# Patient Record
Sex: MALE | Race: WHITE | NOT HISPANIC OR LATINO | Employment: OTHER | URBAN - METROPOLITAN AREA
[De-identification: names, ages, dates, MRNs, and addresses within clinical notes are randomized per-mention and may not be internally consistent; named-entity substitution may affect disease eponyms.]

---

## 2017-01-04 ENCOUNTER — OFFICE VISIT (OUTPATIENT)
Dept: CARDIOLOGY | Facility: CLINIC | Age: 72
End: 2017-01-04

## 2017-01-04 VITALS
HEART RATE: 58 BPM | HEIGHT: 67 IN | SYSTOLIC BLOOD PRESSURE: 122 MMHG | BODY MASS INDEX: 29.1 KG/M2 | DIASTOLIC BLOOD PRESSURE: 58 MMHG | WEIGHT: 185.4 LBS

## 2017-01-04 DIAGNOSIS — Z86.73 STATUS POST CVA: ICD-10-CM

## 2017-01-04 DIAGNOSIS — I25.810 CORONARY ARTERY DISEASE INVOLVING CORONARY BYPASS GRAFT OF NATIVE HEART WITHOUT ANGINA PECTORIS: ICD-10-CM

## 2017-01-04 DIAGNOSIS — E78.5 DYSLIPIDEMIA: ICD-10-CM

## 2017-01-04 DIAGNOSIS — I10 ESSENTIAL HYPERTENSION: ICD-10-CM

## 2017-01-04 PROCEDURE — 99213 OFFICE O/P EST LOW 20 MIN: CPT | Performed by: INTERNAL MEDICINE

## 2017-01-04 RX ORDER — LORATADINE 10 MG/1
10 CAPSULE, LIQUID FILLED ORAL DAILY
COMMUNITY
End: 2020-03-03 | Stop reason: SDUPTHER

## 2017-01-04 NOTE — LETTER
January 4, 2017     Sampson Torres MD  [address] [place] [address]    Patient: Rashad Mendez   YOB: 1945   Date of Visit: 1/4/2017       Dear Dr. Brian MD:    Thank you for referring Rashad Mendez to me for evaluation. Below are the relevant portions of my assessment and plan of care.    If you have questions, please do not hesitate to call me. I look forward to following Rashad along with you.         Sincerely,        Ofelia Sexton MD        CC: No Recipients  Ofelia Sexton MD  1/4/2017 10:29 AM  Signed  Rashad Mendez  1945  [phone]      01/04/2017    Sampson Torres MD    Chief Complaint   Patient presents with   • Coronary Artery Disease     IDENTIFICATION:  Patient is a 71-year-old  white male,  and resident of [place], [place].    PROBLEM LIST:  1. Coronary artery disease:  a. History of extreme fatigue, February 2008.  b. Cardiac catheterization in February 2008:  Status post Liberté 3 mm x 12 mm stent to the OM1, normal LV systolic function.  c. Cardiolite, 10/29/2012:  Positive stress Cardiolite for inducible ischemia in the inferior myocardial segment.  d. Left heart catheterization for anginal symptoms, 11/13/2012, revealing normal LV systolic function and occluded right coronary with left-to-right collaterals, a 70% to 80% ostial left main stenosis, and normal LV systolic function.  e. Coronary artery bypass grafting, 11/16/2012, Dr. Byers:  SVG to the circumflex, SVG to the PDA, LIMA to the LAD.  2. CVA, 02/24/2014, felt secondary to accelerated hypertension:  a. Acute lacunar infarct.  b. 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  c. Subsequent admission, 03/01/2014, for acute on subacute ischemic stroke (right thalamic) with the addition of Plavix.  d. Diabetes, uncontrolled.  3. Hypertension:  a. Bradycardia, on beta-blockers.  Coreg discontinued, 03/01/2014.  4. Hyperlipidemia.  5. Type 2  diabetes mellitus.  6. Gastroesophageal reflux disease.  7. Diverticulitis.  8. Irritable bowel syndrome.  9. Obstructive sleep apnea with CPAP usage at bedtime.  10. Asthma.  11. History of tobacco abuse with cessation 20 years ago.  12. Obesity.  13. History of transient ischemic attack in 1994 (right facial drooping and upper extremity weakness).  14. Surgical history:  a. Status post cholecystectomy.  b. Status post right rotator cuff repair.    Allergies   Allergen Reactions   • Latex Rash   • Levofloxacin Anaphylaxis   • Sulfa Antibiotics Anaphylaxis   • Beta Adrenergic Blockers Other (See Comments)     Significant bradycardia   • Diovan [Valsartan] Diarrhea   • Entex Lq [Phenylephrine-Guaifenesin] Hives       Current Medications:      Current Outpatient Prescriptions:   •  ADVAIR DISKUS 250-50 MCG/DOSE DISKUS, INHALE ONE PUFF BY MOUTH TWICE A DAY, Disp: 1 each, Rfl: 5  •  amLODIPine (NORVASC) 10 MG tablet, Take 10 mg by mouth Daily., Disp: , Rfl:   •  BD PEN NEEDLE JOHN U/F 32G X 4 MM misc, USE WITH INSULIN PEN FOUR TIMES DAILY, Disp: 300 each, Rfl: 2  •  Blood Glucose Monitoring Suppl (ACCU-CHEK COMPACT CARE KIT) kit, Pt. Is visually impaired - qualifies for this meter, Disp: 1 each, Rfl: 0  •  clopidogrel (PLAVIX) 75 MG tablet, TAKE ONE TABLET BY MOUTH DAILY, Disp: 30 tablet, Rfl: 3  •  fluticasone (FLONASE) 50 MCG/ACT nasal spray, PLACE TWO SPRAYS IN EACH NOSTRIL ONCE DAILY, Disp: 3 bottle, Rfl: 4  •  glucose blood (ACCU-CHEK COMPACT TEST DRUM) test strip, Uses X 3 /day, Disp: 102 each, Rfl: 12  •  indapamide (LOZOL) 1.25 MG tablet, TAKE ONE TABLET BY MOUTH DAILY, Disp: 30 tablet, Rfl: 5  •  insulin aspart prot & aspart (NOVOLOG MIX 70/30 FLEXPEN) (70-30) 100 UNIT/ML suspension pen-injector injection, Inject 0.4 mL under the skin 2 (Two) Times a Day Before Meals., Disp: 35 pen, Rfl: 3  •  lisinopril (PRINIVIL,ZESTRIL) 40 MG tablet, TAKE ONE TABLET BY MOUTH DAILY, Disp: 90 tablet, Rfl: 0  •  Loratadine  "(CLARITIN) 10 MG capsule, Take 10 mg by mouth Daily., Disp: , Rfl:   •  MICRONIZED COLESTIPOL HCL 1 G tablet, 1 g Daily., Disp: , Rfl:   •  montelukast (SINGULAIR) 10 MG tablet, Take 10 mg by mouth Every Night., Disp: , Rfl:   •  nitroglycerin (NITROSTAT) 0.4 MG SL tablet, Place  under the tongue., Disp: , Rfl:   •  omeprazole (PriLOSEC) 40 MG capsule, Take  by mouth daily., Disp: , Rfl:   •  pravastatin (PRAVACHOL) 80 MG tablet, Take  by mouth daily., Disp: , Rfl:   •  terazosin (HYTRIN) 10 MG capsule, Take 10 mg by mouth Every Night., Disp: , Rfl:   •  VENTOLIN  (90 BASE) MCG/ACT inhaler, , Disp: , Rfl:     HPI    Mr. Mendez presents today for 12 month follow up with a history of coronary artery disease status post bypass grafting, CVA, hypertension, and hyperlipidemia. He complains today of stomach discomfort and shortness of breath secondary to asthma, but voices no cardiac complaints. Patient denies chest pain, palpitations, edema, PND, orthopnea, dizziness, and syncope. He admits that he does not currently participate in any regular aerobic exercise.    The following portions of the patient's history were reviewed and updated as appropriate: allergies, current medications and problem list.    Pertinent positives as listed in the HPI.  All other systems reviewed are negative.    Vitals:    01/04/17 0950   BP: 122/58   BP Location: Right arm   Patient Position: Sitting   Pulse: 58   Weight: 185 lb 6.4 oz (84.1 kg)   Height: 67\" (170.2 cm)       General: Alert and oriented  Neck: Jugular venous pressure is within normal limits. Carotids have normal upstrokes without bruits.   Cardiovascular: Heart has a nondisplaced focal PMI. Regular rate and rhythm without murmur, gallop or rub.  Lungs: Clear without rales or wheezes. Equal expansion is noted.   Extremities: Show no edema.  Skin: warm and dry.  Neurologic: nonfocal      Diagnostic Data:    Lab Results   Component Value Date    GLUCOSE 144 (H) 12/07/2016    " BUN 18 12/07/2016    CREATININE 1.20 12/07/2016    EGFRIFNONA 60 (L) 12/07/2016    BCR 15.0 12/07/2016    CO2 34.0 (H) 12/07/2016    CALCIUM 9.6 12/07/2016    PROTENTOTREF 7.0 12/07/2016    ALBUMIN 4.00 12/07/2016    LABIL2 1.3 12/07/2016    AST 30 12/07/2016    ALT 41 (H) 12/07/2016     Lab Results   Component Value Date    TSH 2.004 12/07/2016     Procedures    Assessment:      ICD-10-CM ICD-9-CM   1. Coronary artery disease involving coronary bypass graft of native heart without angina pectoris I25.810 414.05   2. Status post CVA Z86.73 V12.54   3. Essential hypertension I10 401.9   4. Dyslipidemia E78.5 272.4       Plan:    1. Discussed regular aerobic exercise (30 minutes, 3-5 times a week).   2. Ordered FLP.   3. Continue current medications.  4. F/up in 12 months or sooner if needed.    Scribed for Ofelia Sexton MD by Brigitte Eisenberg. 1/4/2017  10:20 AM    I, Ofelia Sexton MD, personally performed the services described in this documentation as scribed by the above named individual in my presence, and it is both accurate and complete.  1/4/2017  10:21 AM

## 2017-01-04 NOTE — PROGRESS NOTES
Rashad Mendez  1945  [phone]      01/04/2017    Sampson Torres MD    Chief Complaint   Patient presents with   • Coronary Artery Disease     IDENTIFICATION:  Patient is a 71-year-old  white male,  and resident of [place], [place].    PROBLEM LIST:  1. Coronary artery disease:  a. History of extreme fatigue, February 2008.  b. Cardiac catheterization in February 2008:  Status post Liberté 3 mm x 12 mm stent to the OM1, normal LV systolic function.  c. Cardiolite, 10/29/2012:  Positive stress Cardiolite for inducible ischemia in the inferior myocardial segment.  d. Left heart catheterization for anginal symptoms, 11/13/2012, revealing normal LV systolic function and occluded right coronary with left-to-right collaterals, a 70% to 80% ostial left main stenosis, and normal LV systolic function.  e. Coronary artery bypass grafting, 11/16/2012, Dr. Byers:  SVG to the circumflex, SVG to the PDA, LIMA to the LAD.  2. CVA, 02/24/2014, felt secondary to accelerated hypertension:  a. Acute lacunar infarct.  b. 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  c. Subsequent admission, 03/01/2014, for acute on subacute ischemic stroke (right thalamic) with the addition of Plavix.  d. Diabetes, uncontrolled.  3. Hypertension:  a. Bradycardia, on beta-blockers.  Coreg discontinued, 03/01/2014.  4. Hyperlipidemia.  5. Type 2 diabetes mellitus.  6. Gastroesophageal reflux disease.  7. Diverticulitis.  8. Irritable bowel syndrome.  9. Obstructive sleep apnea with CPAP usage at bedtime.  10. Asthma.  11. History of tobacco abuse with cessation 20 years ago.  12. Obesity.  13. History of transient ischemic attack in 1994 (right facial drooping and upper extremity weakness).  14. Surgical history:  a. Status post cholecystectomy.  b. Status post right rotator cuff repair.    Allergies   Allergen Reactions   • Latex Rash   • Levofloxacin Anaphylaxis   • Sulfa Antibiotics Anaphylaxis   •  Beta Adrenergic Blockers Other (See Comments)     Significant bradycardia   • Diovan [Valsartan] Diarrhea   • Entex Lq [Phenylephrine-Guaifenesin] Hives       Current Medications:      Current Outpatient Prescriptions:   •  ADVAIR DISKUS 250-50 MCG/DOSE DISKUS, INHALE ONE PUFF BY MOUTH TWICE A DAY, Disp: 1 each, Rfl: 5  •  amLODIPine (NORVASC) 10 MG tablet, Take 10 mg by mouth Daily., Disp: , Rfl:   •  BD PEN NEEDLE JOHN U/F 32G X 4 MM misc, USE WITH INSULIN PEN FOUR TIMES DAILY, Disp: 300 each, Rfl: 2  •  Blood Glucose Monitoring Suppl (ACCU-CHEK COMPACT CARE KIT) kit, Pt. Is visually impaired - qualifies for this meter, Disp: 1 each, Rfl: 0  •  clopidogrel (PLAVIX) 75 MG tablet, TAKE ONE TABLET BY MOUTH DAILY, Disp: 30 tablet, Rfl: 3  •  fluticasone (FLONASE) 50 MCG/ACT nasal spray, PLACE TWO SPRAYS IN EACH NOSTRIL ONCE DAILY, Disp: 3 bottle, Rfl: 4  •  glucose blood (ACCU-CHEK COMPACT TEST DRUM) test strip, Uses X 3 /day, Disp: 102 each, Rfl: 12  •  indapamide (LOZOL) 1.25 MG tablet, TAKE ONE TABLET BY MOUTH DAILY, Disp: 30 tablet, Rfl: 5  •  insulin aspart prot & aspart (NOVOLOG MIX 70/30 FLEXPEN) (70-30) 100 UNIT/ML suspension pen-injector injection, Inject 0.4 mL under the skin 2 (Two) Times a Day Before Meals., Disp: 35 pen, Rfl: 3  •  lisinopril (PRINIVIL,ZESTRIL) 40 MG tablet, TAKE ONE TABLET BY MOUTH DAILY, Disp: 90 tablet, Rfl: 0  •  Loratadine (CLARITIN) 10 MG capsule, Take 10 mg by mouth Daily., Disp: , Rfl:   •  MICRONIZED COLESTIPOL HCL 1 G tablet, 1 g Daily., Disp: , Rfl:   •  montelukast (SINGULAIR) 10 MG tablet, Take 10 mg by mouth Every Night., Disp: , Rfl:   •  nitroglycerin (NITROSTAT) 0.4 MG SL tablet, Place  under the tongue., Disp: , Rfl:   •  omeprazole (PriLOSEC) 40 MG capsule, Take  by mouth daily., Disp: , Rfl:   •  pravastatin (PRAVACHOL) 80 MG tablet, Take  by mouth daily., Disp: , Rfl:   •  terazosin (HYTRIN) 10 MG capsule, Take 10 mg by mouth Every Night., Disp: , Rfl:   •  VENTOLIN  " (90 BASE) MCG/ACT inhaler, , Disp: , Rfl:     HPI    Mr. Mendez presents today for 12 month follow up with a history of coronary artery disease status post bypass grafting, CVA, hypertension, and hyperlipidemia. He complains today of stomach discomfort and shortness of breath secondary to asthma, but voices no cardiac complaints. Patient denies chest pain, palpitations, edema, PND, orthopnea, dizziness, and syncope. He admits that he does not currently participate in any regular aerobic exercise.    The following portions of the patient's history were reviewed and updated as appropriate: allergies, current medications and problem list.    Pertinent positives as listed in the HPI.  All other systems reviewed are negative.    Vitals:    01/04/17 0950   BP: 122/58   BP Location: Right arm   Patient Position: Sitting   Pulse: 58   Weight: 185 lb 6.4 oz (84.1 kg)   Height: 67\" (170.2 cm)       General: Alert and oriented  Neck: Jugular venous pressure is within normal limits. Carotids have normal upstrokes without bruits.   Cardiovascular: Heart has a nondisplaced focal PMI. Regular rate and rhythm without murmur, gallop or rub.  Lungs: Clear without rales or wheezes. Equal expansion is noted.   Extremities: Show no edema.  Skin: warm and dry.  Neurologic: nonfocal      Diagnostic Data:    Lab Results   Component Value Date    GLUCOSE 144 (H) 12/07/2016    BUN 18 12/07/2016    CREATININE 1.20 12/07/2016    EGFRIFNONA 60 (L) 12/07/2016    BCR 15.0 12/07/2016    CO2 34.0 (H) 12/07/2016    CALCIUM 9.6 12/07/2016    PROTENTOTREF 7.0 12/07/2016    ALBUMIN 4.00 12/07/2016    LABIL2 1.3 12/07/2016    AST 30 12/07/2016    ALT 41 (H) 12/07/2016     Lab Results   Component Value Date    TSH 2.004 12/07/2016     Procedures    Assessment:      ICD-10-CM ICD-9-CM   1. Coronary artery disease involving coronary bypass graft of native heart without angina pectoris I25.810 414.05   2. Status post CVA Z86.73 V12.54   3. Essential " hypertension I10 401.9   4. Dyslipidemia E78.5 272.4       Plan:    1. Discussed regular aerobic exercise (30 minutes, 3-5 times a week).   2. Ordered FLP.   3. Continue current medications.  4. F/up in 12 months or sooner if needed.    Scribed for Ofelia Sexton MD by Brigitte Eisenberg. 1/4/2017  10:20 AM    I, Ofelia Sexton MD, personally performed the services described in this documentation as scribed by the above named individual in my presence, and it is both accurate and complete.  1/4/2017  10:21 AM

## 2017-01-05 RX ORDER — LISINOPRIL 40 MG/1
TABLET ORAL
Qty: 90 TABLET | Refills: 3 | Status: SHIPPED | OUTPATIENT
Start: 2017-01-05 | End: 2018-01-11 | Stop reason: SDUPTHER

## 2017-01-05 RX ORDER — TERAZOSIN 10 MG/1
CAPSULE ORAL
Qty: 90 CAPSULE | Refills: 3 | Status: SHIPPED | OUTPATIENT
Start: 2017-01-05 | End: 2017-11-27 | Stop reason: SDUPTHER

## 2017-01-23 RX ORDER — PRAVASTATIN SODIUM 80 MG/1
TABLET ORAL
Qty: 90 TABLET | Refills: 1 | Status: SHIPPED | OUTPATIENT
Start: 2017-01-23 | End: 2017-03-09 | Stop reason: ALTCHOICE

## 2017-02-10 RX ORDER — INDAPAMIDE 1.25 MG/1
TABLET, FILM COATED ORAL
Qty: 30 TABLET | Refills: 4 | Status: SHIPPED | OUTPATIENT
Start: 2017-02-10 | End: 2017-07-07 | Stop reason: SDUPTHER

## 2017-03-06 DIAGNOSIS — E11.9 DIABETES MELLITUS TYPE 2, CONTROLLED, WITHOUT COMPLICATIONS (HCC): ICD-10-CM

## 2017-03-06 RX ORDER — LANCETS 33 GAUGE
EACH MISCELLANEOUS
Qty: 50 EACH | Refills: 0 | Status: SHIPPED | OUTPATIENT
Start: 2017-03-06 | End: 2017-06-14 | Stop reason: SDUPTHER

## 2017-03-09 DIAGNOSIS — E78.00 HYPERCHOLESTEREMIA: ICD-10-CM

## 2017-03-09 DIAGNOSIS — I25.10 CAD IN NATIVE ARTERY: Primary | ICD-10-CM

## 2017-03-09 RX ORDER — ATORVASTATIN CALCIUM 40 MG/1
40 TABLET, FILM COATED ORAL DAILY
Qty: 90 TABLET | Refills: 1 | Status: SHIPPED | OUTPATIENT
Start: 2017-03-09 | End: 2017-09-02 | Stop reason: SDUPTHER

## 2017-03-13 RX ORDER — AMLODIPINE BESYLATE 10 MG/1
TABLET ORAL
Qty: 180 TABLET | Refills: 3 | Status: SHIPPED | OUTPATIENT
Start: 2017-03-13 | End: 2018-06-21 | Stop reason: SDUPTHER

## 2017-04-04 ENCOUNTER — LAB REQUISITION (OUTPATIENT)
Dept: LAB | Facility: HOSPITAL | Age: 72
End: 2017-04-04

## 2017-04-04 DIAGNOSIS — R63.5 ABNORMAL WEIGHT GAIN: ICD-10-CM

## 2017-04-04 DIAGNOSIS — D12.8 BENIGN NEOPLASM OF RECTUM: ICD-10-CM

## 2017-04-04 PROCEDURE — 88305 TISSUE EXAM BY PATHOLOGIST: CPT | Performed by: INTERNAL MEDICINE

## 2017-04-05 LAB
CYTO UR: NORMAL
LAB AP CASE REPORT: NORMAL
LAB AP CLINICAL INFORMATION: NORMAL
Lab: NORMAL
PATH REPORT.FINAL DX SPEC: NORMAL
PATH REPORT.GROSS SPEC: NORMAL

## 2017-04-28 RX ORDER — CLOPIDOGREL BISULFATE 75 MG/1
TABLET ORAL
Qty: 90 TABLET | Refills: 3 | Status: SHIPPED | OUTPATIENT
Start: 2017-04-28 | End: 2018-04-23 | Stop reason: SDUPTHER

## 2017-05-16 ENCOUNTER — OFFICE VISIT (OUTPATIENT)
Dept: PULMONOLOGY | Facility: CLINIC | Age: 72
End: 2017-05-16

## 2017-05-16 VITALS
WEIGHT: 188.4 LBS | HEART RATE: 72 BPM | RESPIRATION RATE: 16 BRPM | TEMPERATURE: 98.4 F | OXYGEN SATURATION: 95 % | BODY MASS INDEX: 29.57 KG/M2 | HEIGHT: 67 IN | SYSTOLIC BLOOD PRESSURE: 130 MMHG | DIASTOLIC BLOOD PRESSURE: 78 MMHG

## 2017-05-16 DIAGNOSIS — K21.9 GASTROESOPHAGEAL REFLUX DISEASE WITHOUT ESOPHAGITIS: ICD-10-CM

## 2017-05-16 DIAGNOSIS — J45.20 MILD INTERMITTENT ASTHMA WITHOUT COMPLICATION: ICD-10-CM

## 2017-05-16 DIAGNOSIS — J30.1 SEASONAL ALLERGIC RHINITIS DUE TO POLLEN: Primary | ICD-10-CM

## 2017-05-16 DIAGNOSIS — J98.4 RESTRICTIVE LUNG DISEASE: ICD-10-CM

## 2017-05-16 DIAGNOSIS — G47.33 OBSTRUCTIVE SLEEP APNEA SYNDROME: ICD-10-CM

## 2017-05-16 PROCEDURE — 99214 OFFICE O/P EST MOD 30 MIN: CPT | Performed by: INTERNAL MEDICINE

## 2017-05-16 RX ORDER — MONTELUKAST SODIUM 10 MG/1
10 TABLET ORAL NIGHTLY
Qty: 90 TABLET | Refills: 3 | Status: SHIPPED | OUTPATIENT
Start: 2017-05-16 | End: 2018-05-31 | Stop reason: SDUPTHER

## 2017-05-16 RX ORDER — ALBUTEROL SULFATE 90 UG/1
2 AEROSOL, METERED RESPIRATORY (INHALATION) EVERY 4 HOURS PRN
Qty: 3 INHALER | Refills: 3 | Status: SHIPPED | OUTPATIENT
Start: 2017-05-16 | End: 2017-12-24 | Stop reason: SDUPTHER

## 2017-05-16 RX ORDER — FLUTICASONE PROPIONATE 50 MCG
2 SPRAY, SUSPENSION (ML) NASAL DAILY
Qty: 3 BOTTLE | Refills: 3 | Status: SHIPPED | OUTPATIENT
Start: 2017-05-16 | End: 2018-08-01 | Stop reason: SDUPTHER

## 2017-06-07 DIAGNOSIS — IMO0002 TYPE II DIABETES MELLITUS WITH PERIPHERAL CIRCULATORY DISORDER, UNCONTROLLED: Primary | ICD-10-CM

## 2017-06-14 DIAGNOSIS — E11.9 DIABETES MELLITUS TYPE 2, CONTROLLED, WITHOUT COMPLICATIONS (HCC): ICD-10-CM

## 2017-06-14 RX ORDER — LANCETS 33 GAUGE
EACH MISCELLANEOUS
Qty: 100 EACH | Refills: 0 | Status: SHIPPED | OUTPATIENT
Start: 2017-06-14 | End: 2017-07-21 | Stop reason: ALTCHOICE

## 2017-07-07 RX ORDER — INDAPAMIDE 1.25 MG/1
TABLET, FILM COATED ORAL
Qty: 90 TABLET | Refills: 2 | Status: SHIPPED | OUTPATIENT
Start: 2017-07-07 | End: 2018-05-07 | Stop reason: SDUPTHER

## 2017-07-21 RX ORDER — LANCETS
1 EACH MISCELLANEOUS 3 TIMES DAILY
Qty: 100 EACH | Refills: 5 | Status: SHIPPED | OUTPATIENT
Start: 2017-07-21 | End: 2021-03-31

## 2017-09-05 RX ORDER — ATORVASTATIN CALCIUM 40 MG/1
TABLET, FILM COATED ORAL
Qty: 90 TABLET | Refills: 0 | Status: SHIPPED | OUTPATIENT
Start: 2017-09-05 | End: 2017-11-01 | Stop reason: SDUPTHER

## 2017-09-27 DIAGNOSIS — E11.9 DIABETES MELLITUS TYPE 2, CONTROLLED, WITHOUT COMPLICATIONS (HCC): ICD-10-CM

## 2017-09-27 RX ORDER — LANCETS 33 GAUGE
EACH MISCELLANEOUS
Qty: 100 EACH | Refills: 5 | Status: SHIPPED | OUTPATIENT
Start: 2017-09-27 | End: 2018-11-10 | Stop reason: SDUPTHER

## 2017-11-01 RX ORDER — ATORVASTATIN CALCIUM 40 MG/1
TABLET, FILM COATED ORAL
Qty: 90 TABLET | Refills: 0 | Status: SHIPPED | OUTPATIENT
Start: 2017-11-01 | End: 2018-01-11 | Stop reason: SDUPTHER

## 2017-11-06 ENCOUNTER — OFFICE VISIT (OUTPATIENT)
Dept: PULMONOLOGY | Facility: CLINIC | Age: 72
End: 2017-11-06

## 2017-11-06 VITALS
SYSTOLIC BLOOD PRESSURE: 138 MMHG | WEIGHT: 191 LBS | HEART RATE: 60 BPM | OXYGEN SATURATION: 96 % | DIASTOLIC BLOOD PRESSURE: 72 MMHG | RESPIRATION RATE: 16 BRPM | TEMPERATURE: 98.7 F | HEIGHT: 67 IN | BODY MASS INDEX: 29.98 KG/M2

## 2017-11-06 DIAGNOSIS — J30.1 CHRONIC SEASONAL ALLERGIC RHINITIS DUE TO POLLEN: ICD-10-CM

## 2017-11-06 DIAGNOSIS — R06.02 SHORTNESS OF BREATH: Primary | ICD-10-CM

## 2017-11-06 DIAGNOSIS — G47.33 OBSTRUCTIVE SLEEP APNEA SYNDROME: ICD-10-CM

## 2017-11-06 DIAGNOSIS — J98.4 RESTRICTIVE LUNG DISEASE: ICD-10-CM

## 2017-11-06 DIAGNOSIS — K21.9 GASTROESOPHAGEAL REFLUX DISEASE WITHOUT ESOPHAGITIS: ICD-10-CM

## 2017-11-06 DIAGNOSIS — J45.20 MILD INTERMITTENT ASTHMA WITHOUT COMPLICATION: ICD-10-CM

## 2017-11-06 PROCEDURE — 99214 OFFICE O/P EST MOD 30 MIN: CPT | Performed by: INTERNAL MEDICINE

## 2017-11-06 PROCEDURE — G0009 ADMIN PNEUMOCOCCAL VACCINE: HCPCS | Performed by: INTERNAL MEDICINE

## 2017-11-06 PROCEDURE — 94729 DIFFUSING CAPACITY: CPT | Performed by: INTERNAL MEDICINE

## 2017-11-06 PROCEDURE — 94375 RESPIRATORY FLOW VOLUME LOOP: CPT | Performed by: INTERNAL MEDICINE

## 2017-11-06 PROCEDURE — G0008 ADMIN INFLUENZA VIRUS VAC: HCPCS | Performed by: INTERNAL MEDICINE

## 2017-11-06 PROCEDURE — 90662 IIV NO PRSV INCREASED AG IM: CPT | Performed by: INTERNAL MEDICINE

## 2017-11-06 PROCEDURE — 94726 PLETHYSMOGRAPHY LUNG VOLUMES: CPT | Performed by: INTERNAL MEDICINE

## 2017-11-06 PROCEDURE — 90670 PCV13 VACCINE IM: CPT | Performed by: INTERNAL MEDICINE

## 2017-11-06 RX ORDER — INSULIN ASPART 100 [IU]/ML
INJECTION, SUSPENSION SUBCUTANEOUS
COMMUNITY
Start: 2017-10-24 | End: 2020-06-15

## 2017-11-06 NOTE — PROGRESS NOTES
Pulmonary Follow-up      Patient Care Team:  Sampson Torres MD as PCP - General  Isabell PINEDA MD as Consulting Physician (Endocrinology)    Chief Complaint / Reason: Asthma, obstructive sleep apnea        Subjective    This is a 72-year-old male with a history of asthma followed by Dr. Rod in the past who I originally saw on January 2015.  The patient at the time had been getting over what seemed like a recent asthma exacerbation and had pulmonary function testing consistent with mild to moderate restriction without obstruction.  The patient has obstructive sleep apnea and uses a home CPAP machine.    Interval History:  The patient is here for follow up of asthma.  He has not needed antibiotics or steroids since his last visit.  He reports he uses his rescue inhaler every 4 or 5 days.  It varies based on weather or time of year.  He reports Fall is his worst time of year.  He has had some congestion recently.       He is using Advair 250 one puff twice daily.  He denies fevers or chills.  He is using his CPAP and feels like the mask leaks at times.  He seems to wake up around 2 or 3 am and moves to a chair.  He uses his CPAP nightly, even when he is sleeping in his chair.  He recently got a new mask and really likes it.  He denies LE edema.  He had an overnight oximetry on CPAP after his last visit with no significant oxygen desaturations.    History taken from: patient    PMH/FH/Social History were reviewed and updated appropriately in the electronic medical record.     Review of Systems:   Review of Systems   Constitutional: Negative for activity change, appetite change, chills, diaphoresis, fatigue, fever and unexpected weight change.   HENT: Positive for congestion, rhinorrhea and sinus pressure. Negative for dental problem, ear pain, hearing loss, nosebleeds and postnasal drip.    Eyes: Positive for visual disturbance (stable). Negative for pain, discharge and redness.   Respiratory: Positive for  apnea, shortness of breath and wheezing. Negative for cough, choking, chest tightness and stridor.    Cardiovascular: Negative for chest pain, palpitations and leg swelling.   Gastrointestinal: Positive for diarrhea and nausea. Negative for abdominal distention, abdominal pain, blood in stool, constipation and vomiting.   Endocrine: Positive for heat intolerance. Negative for cold intolerance, polydipsia and polyuria.   Genitourinary: Positive for frequency. Negative for dysuria, hematuria and urgency.   Musculoskeletal: Positive for arthralgias and myalgias. Negative for joint swelling.   Skin: Negative for color change, rash and wound.   Allergic/Immunologic: Positive for environmental allergies. Negative for immunocompromised state.   Neurological: Positive for weakness, light-headedness and numbness (left hand stable). Negative for dizziness, syncope and headaches.   Hematological: Negative for adenopathy. Bruises/bleeds easily.   Psychiatric/Behavioral: Positive for sleep disturbance. Negative for dysphoric mood and suicidal ideas. The patient is not nervous/anxious.    All other systems reviewed and are negative.    All other systems were reviewed and are negative.  Exceptions are noted in the subjective or above.      Objective     Vital Signs         Last Weight    11/06/17  1359   Weight: 191 lb (86.6 kg)       Body mass index is 29.91 kg/(m^2).    Physical Exam:     Constitutional:    Alert, cooperative, Overweight, in no acute distress   Head:    Normocephalic, without obvious abnormality, atraumatic   Eyes:            Lids and lashes normal, conjunctivae and sclerae normal, no   icterus, no pallor, corneas clear, PER   ENMT:   Ears appear intact with no abnormalities noted      No oral lesions, no thrush, oral mucosa moist, Mild nasal mucosal swelling, Mallampati class IV    Neck:   No adenopathy, supple, trachea midline, no thyromegaly, no JVD       Lungs/Resp:     Normal effort, symmetric chest rise,  no crepitus, clear to      auscultation bilaterally, no chest wall tenderness                 Heart/CV:    Regular rhythm and normal rate, normal S1 and S2, no            murmur   Abdomen/GI:     Soft, non-tender, non-distended, normal bowel sounds   :     Deferred   Extremities/MSK:   No clubbing or cyanosis.  No edema.  Normal tone.  No deformities.    Pulses:   Pulses palpable and equal bilaterally   Skin:   No bleeding, bruising or rash   Heme/Lymph:   No cervical or supraclavicular adenopathy.    Neurologic:    Psychiatric:       Moves all extremities with no obvious focal motor deficit.  Cranial nerves 2 - 12 grossly intact   Oriented x 3, normal affect.             Results Review:     I reviewed the patient's new clinical results.       Imaging:  I reviewed the patient's new imaging including reviewing the images and radiologist's report. No new imaging today.  Colon    PFTs:   Full pulmonary function testing was done today.  Please see scanned PFT report for details.    Interpretation: No obstruction.  Moderate restriction.  Low maximal voluntary ventilation.  No air trapping.  Low DLCO which corrects when adjusted for alveolar ventilated volumes.  Compared to prior study on 11/10/2016, FEV1 is decreased by 250 mL on today's study.    Medication Review:       No current facility-administered medications for this visit.     Assessment/Plan   Problems Addressed this Visit        Respiratory    Obstructive sleep apnea syndrome    Allergic rhinitis    Asthma    Restrictive lung disease       Digestive    Gastroesophageal reflux disease      Other Visit Diagnoses     Shortness of breath    -  Primary    Relevant Orders    Pulmonary Function Test (Completed)        Discussion:  Patient with a history of asthma and allergic rhinitis, maintained on Advair 250/50 as well as Singulair and fluticasone nasal spray is here for follow-up.  He is overall doing well.     The patient has been more compliant with  CPAP.    Plan:  High dose flu shot.   Prevnar.   Continue Advair - if worsened symptoms patient can call and go on 3 months of Advair 500 then step back down to 250.    F/U 6 months or PRN.   Continue CPAP.  Continue Singulair.  Continue fluticasone nasal.  Follow-up in 6 months with spirometry    Zane Amador MD  11/10/17  3:39 PM    *. Please note that portions of this note were completed with a voice recognition program. Efforts were made to edit the dictations, but occasionally words are mistranscribed.

## 2017-11-28 RX ORDER — TERAZOSIN 10 MG/1
CAPSULE ORAL
Qty: 90 CAPSULE | Refills: 2 | Status: SHIPPED | OUTPATIENT
Start: 2017-11-28 | End: 2018-11-07 | Stop reason: SDUPTHER

## 2017-12-24 DIAGNOSIS — J45.20 MILD INTERMITTENT ASTHMA WITHOUT COMPLICATION: ICD-10-CM

## 2017-12-26 RX ORDER — ALBUTEROL SULFATE 90 UG/1
AEROSOL, METERED RESPIRATORY (INHALATION)
Qty: 1 INHALER | Refills: 11 | Status: SHIPPED | OUTPATIENT
Start: 2017-12-26 | End: 2020-03-03 | Stop reason: SDUPTHER

## 2018-01-10 ENCOUNTER — APPOINTMENT (OUTPATIENT)
Dept: LAB | Facility: HOSPITAL | Age: 73
End: 2018-01-10

## 2018-01-10 ENCOUNTER — OFFICE VISIT (OUTPATIENT)
Dept: CARDIOLOGY | Facility: CLINIC | Age: 73
End: 2018-01-10

## 2018-01-10 ENCOUNTER — LAB REQUISITION (OUTPATIENT)
Dept: LAB | Facility: HOSPITAL | Age: 73
End: 2018-01-10

## 2018-01-10 VITALS
SYSTOLIC BLOOD PRESSURE: 150 MMHG | HEIGHT: 67 IN | HEART RATE: 60 BPM | WEIGHT: 192.2 LBS | DIASTOLIC BLOOD PRESSURE: 54 MMHG | BODY MASS INDEX: 30.17 KG/M2

## 2018-01-10 DIAGNOSIS — I25.10 CAD IN NATIVE ARTERY: Primary | ICD-10-CM

## 2018-01-10 DIAGNOSIS — I25.10 CORONARY ARTERY DISEASE INVOLVING NATIVE CORONARY ARTERY OF NATIVE HEART WITHOUT ANGINA PECTORIS: Primary | ICD-10-CM

## 2018-01-10 DIAGNOSIS — I67.9 CEREBROVASCULAR DISEASE: ICD-10-CM

## 2018-01-10 DIAGNOSIS — R09.89 BRUIT: ICD-10-CM

## 2018-01-10 DIAGNOSIS — R53.83 OTHER FATIGUE: ICD-10-CM

## 2018-01-10 DIAGNOSIS — E78.5 DYSLIPIDEMIA: ICD-10-CM

## 2018-01-10 DIAGNOSIS — E78.00 PURE HYPERCHOLESTEROLEMIA: ICD-10-CM

## 2018-01-10 DIAGNOSIS — I25.10 ATHEROSCLEROTIC HEART DISEASE OF NATIVE CORONARY ARTERY WITHOUT ANGINA PECTORIS: ICD-10-CM

## 2018-01-10 DIAGNOSIS — I10 ESSENTIAL HYPERTENSION: ICD-10-CM

## 2018-01-10 LAB
ALBUMIN SERPL-MCNC: 4.2 G/DL (ref 3.2–4.8)
ALP SERPL-CCNC: 96 U/L (ref 25–100)
ALT SERPL-CCNC: 31 U/L (ref 7–40)
AST SERPL-CCNC: 25 U/L (ref 0–33)
BILIRUB DIRECT SERPL-MCNC: 0.2 MG/DL (ref 0–0.2)
BILIRUB SERPL-MCNC: 0.8 MG/DL (ref 0.3–1.2)
CHOLEST SERPL-MCNC: 142 MG/DL (ref 0–200)
HDLC SERPL-MCNC: 34 MG/DL (ref 40–60)
LDLC SERPL CALC-MCNC: 61 MG/DL (ref 0–100)
PROT SERPL-MCNC: 7.2 G/DL (ref 5.7–8.2)
TRIGL SERPL-MCNC: 234 MG/DL (ref 0–150)
VLDLC SERPL CALC-MCNC: 46.8 MG/DL

## 2018-01-10 PROCEDURE — 99213 OFFICE O/P EST LOW 20 MIN: CPT | Performed by: INTERNAL MEDICINE

## 2018-01-10 PROCEDURE — 36415 COLL VENOUS BLD VENIPUNCTURE: CPT

## 2018-01-10 NOTE — PROGRESS NOTES
Rashad Mendez  1945  [phone]      01/10/2018    Sampson Torres MD    Chief Complaint: Coronary Artery Disease    PROBLEM LIST:  1. Coronary artery disease:  a. History of extreme fatigue, February 2008.  b. Cardiac catheterization in February 2008:  Status post Liberté 3 mm x 12 mm stent to the OM1, normal LV systolic function.  c. Cardiolite, 10/29/2012:  Positive stress Cardiolite for inducible ischemia in the inferior myocardial segment.  d. Left heart catheterization for anginal symptoms, 11/13/2012, revealing normal LV systolic function and occluded right coronary with left-to-right collaterals, a 70% to 80% ostial left main stenosis, and normal LV systolic function.  e. Coronary artery bypass grafting, 11/16/2012, Dr. Byers:  SVG to the circumflex, SVG to the PDA, LIMA to the LAD.  2. CVA, 02/24/2014, felt secondary to accelerated hypertension:  a. Acute lacunar infarct.  b. 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  c. Subsequent admission, 03/01/2014, for acute on subacute ischemic stroke (right thalamic) with the addition of Plavix.  d. Diabetes, uncontrolled.  3. Hypertension:  a. Bradycardia, on beta-blockers.  Coreg discontinued, 03/01/2014.  4. Hyperlipidemia.  5. Type 2 diabetes mellitus.  6. Gastroesophageal reflux disease.  7. Diverticulitis.  8. Irritable bowel syndrome.  9. Obstructive sleep apnea with CPAP usage at bedtime.  10. Asthma.  11. History of tobacco abuse with cessation 20 years ago.  12. Obesity.  13. History of transient ischemic attack in 1994 (right facial drooping and upper extremity weakness).  14. Surgical history:  a. Status post cholecystectomy.  b. Status post right rotator cuff repair.    Allergies   Allergen Reactions   • Latex Rash   • Levofloxacin Anaphylaxis   • Sulfa Antibiotics Anaphylaxis   • Beta Adrenergic Blockers Other (See Comments)     Significant bradycardia   • Diovan [Valsartan] Diarrhea   • Entex Lq [Phenylephrine-Guaifenesin]  Hives       Current Medications:    Current Outpatient Prescriptions:   •  ACCU-CHEK SOFTCLIX LANCETS lancets, 1 each by Other route 3 (Three) Times a Day. Use as instructed, Disp: 100 each, Rfl: 5  •  amLODIPine (NORVASC) 10 MG tablet, TAKE ONE TABLET BY MOUTH TWICE A DAY, Disp: 180 tablet, Rfl: 3  •  atorvastatin (LIPITOR) 40 MG tablet, TAKE ONE TABLET BY MOUTH DAILY, Disp: 90 tablet, Rfl: 0  •  BD PEN NEEDLE JOHN U/F 32G X 4 MM misc, USE WITH INSULIN PEN FOUR TIMES DAILY, Disp: 300 each, Rfl: 2  •  Blood Glucose Monitoring Suppl (ACCU-CHEK COMPACT CARE KIT) kit, Pt. Is visually impaired - qualifies for this meter, Disp: 1 each, Rfl: 0  •  clopidogrel (PLAVIX) 75 MG tablet, TAKE ONE TABLET BY MOUTH DAILY, Disp: 90 tablet, Rfl: 3  •  fluticasone (FLONASE) 50 MCG/ACT nasal spray, 2 sprays into each nostril Daily., Disp: 3 bottle, Rfl: 3  •  fluticasone-salmeterol (ADVAIR DISKUS) 250-50 MCG/DOSE DISKUS, Inhale 1 puff 2 (Two) Times a Day., Disp: 3 each, Rfl: 3  •  glucose blood (ACCU-CHEK COMPACT TEST DRUM) test strip, Uses X 3 /day, Disp: 102 each, Rfl: 12  •  indapamide (LOZOL) 1.25 MG tablet, TAKE ONE TABLET BY MOUTH DAILY, Disp: 90 tablet, Rfl: 2  •  lisinopril (PRINIVIL,ZESTRIL) 40 MG tablet, TAKE ONE TABLET BY MOUTH DAILY, Disp: 90 tablet, Rfl: 3  •  Loratadine (CLARITIN) 10 MG capsule, Take 10 mg by mouth Daily., Disp: , Rfl:   •  MICRONIZED COLESTIPOL HCL 1 G tablet, 1 g Daily., Disp: , Rfl:   •  montelukast (SINGULAIR) 10 MG tablet, Take 1 tablet by mouth Every Night., Disp: 90 tablet, Rfl: 3  •  nitroglycerin (NITROSTAT) 0.4 MG SL tablet, Place  under the tongue., Disp: , Rfl:   •  NOVOLOG MIX 70/30 FLEXPEN (70-30) 100 UNIT/ML suspension pen-injector injection, , Disp: , Rfl:   •  omeprazole (PriLOSEC) 40 MG capsule, Take  by mouth daily., Disp: , Rfl:   •  ONETOUCH DELICA LANCETS 33G misc, TEST BLOOD SUGAR ONCE DAILY, Disp: 100 each, Rfl: 5  •  terazosin (HYTRIN) 10 MG capsule, TAKE ONE CAPSULE BY MOUTH  "EVERY NIGHT AT BEDTIME, Disp: 90 capsule, Rfl: 2  •  VENTOLIN  (90 Base) MCG/ACT inhaler, INHALE TWO PUFFS BY MOUTH EVERY 4 HOURS AS NEEDED FOR WHEEZING, Disp: 1 inhaler, Rfl: 11    HPI  Rashad Mendez returns today for follow up with a history of coronary artery disease, CVA, hypertension, and hyperlipidemia. Since last visit, patient has been doing well overall from a cardiovascular standpoint. He reports no chest pain, and mentions that he never had any in the past. His main heart-related symptom was fatigue. He is concerned that his diastolic BP is too low as it is running in the fifties. His systolic BP has been 130-140. Denies any complaints of chest pain, pressure, tightness, or unusual dyspnea. He does not follow a routine exercise regimen. He has sleep apnea, and uses a CPAP machine every night.     The following portions of the patient's history were reviewed and updated as appropriate: allergies, current medications and problem list.    Pertinent positives as listed in the HPI.  All other systems reviewed are negative.    Vitals:    01/10/18 1016   BP: 150/54   BP Location: Right arm   Patient Position: Sitting   Pulse: 60   Weight: 87.2 kg (192 lb 3.2 oz)   Height: 170.2 cm (67\")       General: Alert, awake, and oriented  Neck: Jugular venous pressure is within normal limits. Carotids have normal upstrokes without bruits.   Cardiovascular: Heart has a nondisplaced focal PMI. Regular rate and rhythm without murmur, gallop or rub.  Lungs: Clear without rales or wheezes. Equal expansion is noted.   Extremities: Shows trace edema bilaterally.  Skin: Warm and dry.  Neurologic: nonfocal    Diagnostic Data:  Lab Results   Component Value Date    CHLPL 175 02/26/2014    TRIG 300 (H) 02/26/2014    HDL 33 (L) 02/26/2014    LDLDIRECT 81 02/26/2014       Procedures    Assessment:    ICD-10-CM ICD-9-CM   1. Coronary artery disease involving native coronary artery of native heart without angina pectoris I25.10 " 414.01   2. Cerebrovascular disease I67.9 437.9   3. Essential hypertension I10 401.9   4. Dyslipidemia E78.5 272.4       Plan:    1. Start a routine exercise regimen; 30 minutes per day, 4-5 days per week. Start with five minutes and increase slowly.   2. Perform Cardiolite GXTas his primary symptom prior to CABG was fatigue  3. Perform carotid ultrasound due to hearing a little bit of blockage  4. Continue current medications.  5. F/up in15 months or sooner if needed.      Scribed for Ofelia Sexton MD by Caitlyn Monroe. 1/10/2018  10:26 AM    I Ofelia Sexton MD personally performed the services described in this documentation as scribed by the above individual in my presence, and it is both accurate and complete.    Ofelia Sexton MD, FACC

## 2018-01-12 RX ORDER — LISINOPRIL 40 MG/1
TABLET ORAL
Qty: 90 TABLET | Refills: 3 | Status: SHIPPED | OUTPATIENT
Start: 2018-01-12 | End: 2019-01-13 | Stop reason: SDUPTHER

## 2018-01-12 RX ORDER — ATORVASTATIN CALCIUM 40 MG/1
TABLET, FILM COATED ORAL
Qty: 90 TABLET | Refills: 1 | Status: SHIPPED | OUTPATIENT
Start: 2018-01-12 | End: 2018-08-01 | Stop reason: SDUPTHER

## 2018-04-16 ENCOUNTER — HOSPITAL ENCOUNTER (OUTPATIENT)
Dept: CARDIOLOGY | Facility: HOSPITAL | Age: 73
Discharge: HOME | End: 2018-04-16
Attending: INTERNAL MEDICINE

## 2018-04-16 ENCOUNTER — HOSPITAL ENCOUNTER (OUTPATIENT)
Dept: CARDIOLOGY | Facility: HOSPITAL | Age: 73
End: 2018-04-16
Attending: INTERNAL MEDICINE

## 2018-04-16 ENCOUNTER — HOSPITAL ENCOUNTER (OUTPATIENT)
Dept: CARDIOLOGY | Facility: HOSPITAL | Age: 73
Discharge: HOME | End: 2018-04-16
Attending: INTERNAL MEDICINE | Admitting: INTERNAL MEDICINE

## 2018-04-16 VITALS — WEIGHT: 190 LBS | BODY MASS INDEX: 29.82 KG/M2 | HEIGHT: 67 IN

## 2018-04-16 DIAGNOSIS — I25.10 CAD IN NATIVE ARTERY: ICD-10-CM

## 2018-04-16 DIAGNOSIS — R53.83 OTHER FATIGUE: ICD-10-CM

## 2018-04-16 DIAGNOSIS — R09.89 BRUIT: ICD-10-CM

## 2018-04-16 PROCEDURE — 0 TECHNETIUM SESTAMIBI: Performed by: INTERNAL MEDICINE

## 2018-04-16 PROCEDURE — 93880 EXTRACRANIAL BILAT STUDY: CPT

## 2018-04-16 PROCEDURE — 93880 EXTRACRANIAL BILAT STUDY: CPT | Performed by: INTERNAL MEDICINE

## 2018-04-16 PROCEDURE — A9500 TC99M SESTAMIBI: HCPCS | Performed by: INTERNAL MEDICINE

## 2018-04-16 PROCEDURE — 25010000002 REGADENOSON 0.4 MG/5ML SOLUTION: Performed by: INTERNAL MEDICINE

## 2018-04-16 PROCEDURE — 93017 CV STRESS TEST TRACING ONLY: CPT

## 2018-04-16 PROCEDURE — 93018 CV STRESS TEST I&R ONLY: CPT | Performed by: INTERNAL MEDICINE

## 2018-04-16 PROCEDURE — 78452 HT MUSCLE IMAGE SPECT MULT: CPT

## 2018-04-16 PROCEDURE — 78452 HT MUSCLE IMAGE SPECT MULT: CPT | Performed by: INTERNAL MEDICINE

## 2018-04-16 RX ADMIN — TECHNETIUM TC 99M SESTAMIBI 1 DOSE: 1 INJECTION INTRAVENOUS at 11:05

## 2018-04-16 RX ADMIN — TECHNETIUM TC 99M SESTAMIBI 1 DOSE: 1 INJECTION INTRAVENOUS at 09:30

## 2018-04-16 RX ADMIN — REGADENOSON 0.4 MG: 0.08 INJECTION, SOLUTION INTRAVENOUS at 11:07

## 2018-04-17 LAB
BH CV ECHO MEAS - BSA(HAYCOCK): 2.1 M^2
BH CV ECHO MEAS - BSA: 2 M^2
BH CV ECHO MEAS - BZI_BMI: 30.1 KILOGRAMS/M^2
BH CV ECHO MEAS - BZI_METRIC_HEIGHT: 170.2 CM
BH CV ECHO MEAS - BZI_METRIC_WEIGHT: 87.1 KG
BH CV STRESS BP STAGE 1: NORMAL
BH CV STRESS BP STAGE 3: NORMAL
BH CV STRESS COMMENTS STAGE 1: NORMAL
BH CV STRESS DOSE REGADENOSON STAGE 1: 0.4
BH CV STRESS DURATION MIN STAGE 1: 1
BH CV STRESS DURATION MIN STAGE 2: 1
BH CV STRESS DURATION MIN STAGE 3: 1
BH CV STRESS DURATION MIN STAGE 4: 1
BH CV STRESS DURATION SEC STAGE 1: 0
BH CV STRESS DURATION SEC STAGE 2: 0
BH CV STRESS DURATION SEC STAGE 3: 0
BH CV STRESS DURATION SEC STAGE 4: 0
BH CV STRESS HR STAGE 1: 95
BH CV STRESS HR STAGE 2: 93
BH CV STRESS HR STAGE 3: 88
BH CV STRESS HR STAGE 4: 83
BH CV STRESS PROTOCOL 1: NORMAL
BH CV STRESS RECOVERY BP: NORMAL MMHG
BH CV STRESS RECOVERY HR: 73 BPM
BH CV STRESS STAGE 1: 1
BH CV STRESS STAGE 2: 2
BH CV STRESS STAGE 3: 3
BH CV STRESS STAGE 4: 4
BH CV XLRA MEAS LEFT CCA RATIO VEL: 112 CM/SEC
BH CV XLRA MEAS LEFT DIST CCA EDV: 16.1 CM/SEC
BH CV XLRA MEAS LEFT DIST CCA PSV: 92.9 CM/SEC
BH CV XLRA MEAS LEFT DIST ICA EDV: 22.8 CM/SEC
BH CV XLRA MEAS LEFT DIST ICA PSV: 127.3 CM/SEC
BH CV XLRA MEAS LEFT ICA RATIO VEL: 181 CM/SEC
BH CV XLRA MEAS LEFT ICA/CCA RATIO: 1.6
BH CV XLRA MEAS LEFT MID CCA EDV: 16.5 CM/SEC
BH CV XLRA MEAS LEFT MID CCA PSV: 113.1 CM/SEC
BH CV XLRA MEAS LEFT MID ICA EDV: 20.4 CM/SEC
BH CV XLRA MEAS LEFT MID ICA PSV: 161.9 CM/SEC
BH CV XLRA MEAS LEFT PROX CCA EDV: 13.1 CM/SEC
BH CV XLRA MEAS LEFT PROX CCA PSV: 303.4 CM/SEC
BH CV XLRA MEAS LEFT PROX ECA PSV: 259.3 CM/SEC
BH CV XLRA MEAS LEFT PROX ICA EDV: 21.6 CM/SEC
BH CV XLRA MEAS LEFT PROX ICA PSV: 181 CM/SEC
BH CV XLRA MEAS LEFT PROX SCLA PSV: 325.5 CM/SEC
BH CV XLRA MEAS LEFT VERTEBRAL A EDV: 8.3 CM/SEC
BH CV XLRA MEAS LEFT VERTEBRAL A PSV: 44 CM/SEC
BH CV XLRA MEAS RIGHT CCA RATIO VEL: 105 CM/SEC
BH CV XLRA MEAS RIGHT DIST CCA EDV: 16.1 CM/SEC
BH CV XLRA MEAS RIGHT DIST CCA PSV: 113.8 CM/SEC
BH CV XLRA MEAS RIGHT DIST ICA EDV: 21 CM/SEC
BH CV XLRA MEAS RIGHT DIST ICA PSV: 128.3 CM/SEC
BH CV XLRA MEAS RIGHT ICA RATIO VEL: 148 CM/SEC
BH CV XLRA MEAS RIGHT ICA/CCA RATIO: 1.4
BH CV XLRA MEAS RIGHT MID CCA EDV: 14.7 CM/SEC
BH CV XLRA MEAS RIGHT MID CCA PSV: 106.1 CM/SEC
BH CV XLRA MEAS RIGHT MID ICA EDV: 22.6 CM/SEC
BH CV XLRA MEAS RIGHT MID ICA PSV: 136.5 CM/SEC
BH CV XLRA MEAS RIGHT PROX CCA EDV: 9 CM/SEC
BH CV XLRA MEAS RIGHT PROX CCA PSV: 115.6 CM/SEC
BH CV XLRA MEAS RIGHT PROX ECA PSV: 204.9 CM/SEC
BH CV XLRA MEAS RIGHT PROX ICA EDV: 23.6 CM/SEC
BH CV XLRA MEAS RIGHT PROX ICA PSV: 149.3 CM/SEC
BH CV XLRA MEAS RIGHT PROX SCLA PSV: 232.6 CM/SEC
LEFT ARM BP: NORMAL MMHG
LV EF NUC BP: 62 %
MAXIMAL PREDICTED HEART RATE: 148 BPM
PERCENT MAX PREDICTED HR: 64.19 %
RIGHT ARM BP: NORMAL MMHG
STRESS BASELINE BP: NORMAL MMHG
STRESS BASELINE HR: 82 BPM
STRESS PERCENT HR: 76 %
STRESS POST ESTIMATED WORKLOAD: 1 METS
STRESS POST EXERCISE DUR MIN: 4 MIN
STRESS POST EXERCISE DUR SEC: 0 SEC
STRESS POST PEAK BP: NORMAL MMHG
STRESS POST PEAK HR: 95 BPM
STRESS TARGET HR: 126 BPM

## 2018-04-23 DIAGNOSIS — J45.20 MILD INTERMITTENT ASTHMA WITHOUT COMPLICATION: ICD-10-CM

## 2018-04-24 RX ORDER — CLOPIDOGREL BISULFATE 75 MG/1
TABLET ORAL
Qty: 90 TABLET | Refills: 3 | Status: SHIPPED | OUTPATIENT
Start: 2018-04-24 | End: 2019-06-02 | Stop reason: SDUPTHER

## 2018-05-08 RX ORDER — INDAPAMIDE 1.25 MG/1
TABLET, FILM COATED ORAL
Qty: 90 TABLET | Refills: 1 | Status: SHIPPED | OUTPATIENT
Start: 2018-05-08 | End: 2018-11-01 | Stop reason: SDUPTHER

## 2018-05-08 RX ORDER — INDAPAMIDE 1.25 MG/1
TABLET, FILM COATED ORAL
Qty: 30 TABLET | Refills: 3 | OUTPATIENT
Start: 2018-05-08

## 2018-05-31 DIAGNOSIS — J45.20 MILD INTERMITTENT ASTHMA WITHOUT COMPLICATION: ICD-10-CM

## 2018-05-31 DIAGNOSIS — J30.1 SEASONAL ALLERGIC RHINITIS DUE TO POLLEN: ICD-10-CM

## 2018-05-31 RX ORDER — MONTELUKAST SODIUM 10 MG/1
TABLET ORAL
Qty: 90 TABLET | Refills: 0 | Status: SHIPPED | OUTPATIENT
Start: 2018-05-31 | End: 2018-08-31 | Stop reason: SDUPTHER

## 2018-06-18 ENCOUNTER — OFFICE VISIT (OUTPATIENT)
Dept: PULMONOLOGY | Facility: CLINIC | Age: 73
End: 2018-06-18

## 2018-06-18 VITALS
TEMPERATURE: 98.1 F | DIASTOLIC BLOOD PRESSURE: 58 MMHG | SYSTOLIC BLOOD PRESSURE: 136 MMHG | HEIGHT: 67 IN | OXYGEN SATURATION: 97 % | RESPIRATION RATE: 16 BRPM | WEIGHT: 193 LBS | BODY MASS INDEX: 30.29 KG/M2 | HEART RATE: 61 BPM

## 2018-06-18 DIAGNOSIS — J98.4 RESTRICTIVE LUNG DISEASE: ICD-10-CM

## 2018-06-18 DIAGNOSIS — R06.02 SHORTNESS OF BREATH: Primary | ICD-10-CM

## 2018-06-18 DIAGNOSIS — G47.33 OBSTRUCTIVE SLEEP APNEA SYNDROME: ICD-10-CM

## 2018-06-18 DIAGNOSIS — J45.20 MILD INTERMITTENT ASTHMA WITHOUT COMPLICATION: ICD-10-CM

## 2018-06-18 DIAGNOSIS — K21.9 GASTROESOPHAGEAL REFLUX DISEASE WITHOUT ESOPHAGITIS: ICD-10-CM

## 2018-06-18 PROCEDURE — 94375 RESPIRATORY FLOW VOLUME LOOP: CPT | Performed by: NURSE PRACTITIONER

## 2018-06-18 PROCEDURE — 99213 OFFICE O/P EST LOW 20 MIN: CPT | Performed by: NURSE PRACTITIONER

## 2018-06-18 RX ORDER — AZELASTINE 1 MG/ML
2 SPRAY, METERED NASAL DAILY
Qty: 1 EACH | Refills: 6 | Status: SHIPPED | OUTPATIENT
Start: 2018-06-18 | End: 2019-03-29 | Stop reason: SDUPTHER

## 2018-06-18 NOTE — PROGRESS NOTES
[facility] Follow up    CHIEF COMPLAINT    Asthma, obstructive sleep apnea    HISTORY OF PRESENT ILLNESS    Rashad Mendez is a 73 y.o.male here today for routine six-month follow-up, he was last seen by Dr. Amador in November with a history of mild persistent asthma and obstructive sleep apnea.  For his asthma he uses Advair 250/50 once a day in the evenings.  He also takes the Mucinex as needed for postnasal drainage at night.  He does have chronic postnasal drainage seasonally.  He takes a Claritin and Flonase nasal spray.  For the spring it has worsened a little bit.  He uses an albuterol rescue inhaler once a day the last few days due to allergies.  He did well over the winter with no acute exacerbations or illnesses.    He does wear CPAP at night for obstructive sleep apnea.  He wears it nightly and tolerates it well.  He does awaken well rested and less dyspneic.    He follows with cardiology for history of coronary artery disease, hypertension, and hyperlipidemia.  He had a follow-up stress test in April with no evidence of ischemia.      Patient Active Problem List   Diagnosis   • Coronary artery disease   • Cerebrovascular disease   • Diabetic retinopathy   • Gastroesophageal reflux disease   • Hypertension   • Adiposity   • Obstructive sleep apnea syndrome   • Peripheral vascular disease   • Acute cerebral infarction   • Allergic rhinitis   • Asthma   • Blurry vision   • Carpal tunnel syndrome   • Diverticulosis   • Dyslipidemia   • Edema   • Irritable bowel syndrome   • Long term current use of insulin   • Oral thrush   • Paresthesia   • Restrictive lung disease   • Type II diabetes mellitus with peripheral circulatory disorder, uncontrolled       Allergies   Allergen Reactions   • Latex Rash   • Levofloxacin Anaphylaxis   • Sulfa Antibiotics Anaphylaxis   • Beta Adrenergic Blockers Other (See Comments)     Significant bradycardia   • Diovan [Valsartan] Diarrhea   • Entex Lq  [Phenylephrine-Guaifenesin] Hives       Current Outpatient Prescriptions:   •  ACCU-CHEK SOFTCLIX LANCETS lancets, 1 each by Other route 3 (Three) Times a Day. Use as instructed, Disp: 100 each, Rfl: 5  •  ADVAIR DISKUS 250-50 MCG/DOSE DISKUS, INHALE ONE PUFF BY MOUTH TWICE A DAY, Disp: 60 each, Rfl: 2  •  amLODIPine (NORVASC) 10 MG tablet, TAKE ONE TABLET BY MOUTH TWICE A DAY, Disp: 180 tablet, Rfl: 3  •  atorvastatin (LIPITOR) 40 MG tablet, TAKE ONE TABLET BY MOUTH DAILY, Disp: 90 tablet, Rfl: 1  •  BD PEN NEEDLE JOHN U/F 32G X 4 MM misc, USE WITH INSULIN PEN FOUR TIMES DAILY, Disp: 300 each, Rfl: 2  •  Blood Glucose Monitoring Suppl (ACCU-CHEK COMPACT CARE KIT) kit, Pt. Is visually impaired - qualifies for this meter, Disp: 1 each, Rfl: 0  •  clopidogrel (PLAVIX) 75 MG tablet, TAKE ONE TABLET BY MOUTH DAILY, Disp: 90 tablet, Rfl: 3  •  fluticasone (FLONASE) 50 MCG/ACT nasal spray, 2 sprays into each nostril Daily., Disp: 3 bottle, Rfl: 3  •  glucose blood (ACCU-CHEK COMPACT TEST DRUM) test strip, Uses X 3 /day, Disp: 102 each, Rfl: 12  •  indapamide (LOZOL) 1.25 MG tablet, TAKE ONE TABLET BY MOUTH DAILY, Disp: 90 tablet, Rfl: 1  •  lisinopril (PRINIVIL,ZESTRIL) 40 MG tablet, TAKE ONE TABLET BY MOUTH DAILY, Disp: 90 tablet, Rfl: 3  •  Loratadine (CLARITIN) 10 MG capsule, Take 10 mg by mouth Daily., Disp: , Rfl:   •  MICRONIZED COLESTIPOL HCL 1 G tablet, 1 g Daily., Disp: , Rfl:   •  montelukast (SINGULAIR) 10 MG tablet, TAKE ONE TABLET BY MOUTH ONCE NIGHTLY, Disp: 90 tablet, Rfl: 0  •  nitroglycerin (NITROSTAT) 0.4 MG SL tablet, Place  under the tongue., Disp: , Rfl:   •  NOVOLOG MIX 70/30 FLEXPEN (70-30) 100 UNIT/ML suspension pen-injector injection, , Disp: , Rfl:   •  omeprazole (PriLOSEC) 40 MG capsule, Take 40 mg by mouth Daily., Disp: , Rfl:   •  ONETOUCH DELICA LANCETS 33G misc, TEST BLOOD SUGAR ONCE DAILY, Disp: 100 each, Rfl: 5  •  terazosin (HYTRIN) 10 MG capsule, TAKE ONE CAPSULE BY MOUTH EVERY NIGHT AT  "BEDTIME, Disp: 90 capsule, Rfl: 2  •  VENTOLIN  (90 Base) MCG/ACT inhaler, INHALE TWO PUFFS BY MOUTH EVERY 4 HOURS AS NEEDED FOR WHEEZING, Disp: 1 inhaler, Rfl: 11  MEDICATION LIST AND ALLERGIES REVIEWED.    Social History   Substance Use Topics   • Smoking status: Former Smoker     Types: Cigarettes   • Smokeless tobacco: Never Used   • Alcohol use No       FAMILY AND SOCIAL HISTORY REVIEWED.    Review of Systems   Constitutional: Negative for chills, fatigue, fever and unexpected weight change.   HENT: Positive for postnasal drip. Negative for congestion, nosebleeds, rhinorrhea, sinus pressure and trouble swallowing.    Respiratory: Negative for cough, chest tightness, shortness of breath and wheezing.    Cardiovascular: Negative for chest pain and leg swelling.   Gastrointestinal: Negative for abdominal pain, constipation, diarrhea, nausea and vomiting.   Genitourinary: Negative for dysuria, frequency, hematuria and urgency.   Musculoskeletal: Negative for myalgias.   Neurological: Negative for dizziness, weakness, numbness and headaches.   All other systems reviewed and are negative.  .    /58 (BP Location: Left arm, Patient Position: Sitting, Cuff Size: Adult)   Pulse 61   Temp 98.1 °F (36.7 °C)   Resp 16   Ht 170.2 cm (67\")   Wt 87.5 kg (193 lb)   SpO2 97%   BMI 30.23 kg/m²     Immunization History   Administered Date(s) Administered   • Flu Vaccine High Dose PF 65YR+ 11/06/2017   • Pneumococcal Conjugate 13-Valent (PCV13) 11/06/2017   • influenza Split 12/07/2016       Physical Exam   Constitutional: He is oriented to person, place, and time. He appears well-developed and well-nourished.   HENT:   Head: Normocephalic and atraumatic.   Eyes: EOM are normal. Pupils are equal, round, and reactive to light.   Neck: Normal range of motion. Neck supple.   Cardiovascular: Normal rate and regular rhythm.    No murmur heard.  Pulmonary/Chest: Effort normal and breath sounds normal. No respiratory " distress. He has no wheezes. He has no rales.   Abdominal: Soft. Bowel sounds are normal. He exhibits no distension.   Musculoskeletal: Normal range of motion. He exhibits no edema.   Neurological: He is alert and oriented to person, place, and time.   Skin: Skin is warm and dry. No erythema.   Psychiatric: He has a normal mood and affect. His behavior is normal.   Vitals reviewed.        RESULTS    PFTS in the office today, read by me:  No significant changes and some mild restriction, FEV1 2.00, 70% and no obstruction.    PROBLEM LIST    Problem List Items Addressed This Visit        Respiratory    Obstructive sleep apnea syndrome    Overview     Description: A.  On CPAP therapy daily at bedtime.         Asthma    Restrictive lung disease    Overview      A. Mild to moderate restriction on pulmonary function testing January 30, 2015. May be      secondary to obesity.              Digestive    Gastroesophageal reflux disease      Other Visit Diagnoses     Shortness of breath    -  Primary    Relevant Orders    Pulmonary Function Test (Completed)            DISCUSSION    Continue on his Advair, we did discuss using it twice a day seasonally to help with his shortness of breath during the day.  Continue on his Flonase and Mucinex for his postnasal drainage, as well as a antihistamine and Singulair daily.  I will add on some Astelin nasal spray to help with his worsening postnasal drainage this spring.    Continue on his CPAP at night for his obstructive sleep apnea, he tolerates it well.  He gets his supplies from [employer].    Follow-up in 6 months or as needed.    I spent 15 minutes with the patient and family. I spent > 50% percent of this time counseling and discussing diagnostic testing, evaluation, current status and management.    Maggie Doss, LOUISA  06/18/201811:18 AM  Electronically signed     Please note that portions of this note were completed with a voice recognition program. Efforts were made to edit  the dictations, but occasionally words are mistranscribed.      CC: Sampson Torres MD

## 2018-06-21 RX ORDER — AMLODIPINE BESYLATE 10 MG/1
10 TABLET ORAL DAILY
Qty: 90 TABLET | Refills: 2 | Status: SHIPPED | OUTPATIENT
Start: 2018-06-21 | End: 2019-03-18 | Stop reason: SDUPTHER

## 2018-08-01 DIAGNOSIS — J45.20 MILD INTERMITTENT ASTHMA WITHOUT COMPLICATION: ICD-10-CM

## 2018-08-01 DIAGNOSIS — J30.1 SEASONAL ALLERGIC RHINITIS DUE TO POLLEN: ICD-10-CM

## 2018-08-01 RX ORDER — FLUTICASONE PROPIONATE 50 MCG
SPRAY, SUSPENSION (ML) NASAL
Qty: 1 BOTTLE | Refills: 2 | Status: SHIPPED | OUTPATIENT
Start: 2018-08-01 | End: 2018-10-31 | Stop reason: SDUPTHER

## 2018-08-01 RX ORDER — ATORVASTATIN CALCIUM 40 MG/1
TABLET, FILM COATED ORAL
Qty: 90 TABLET | Refills: 1 | Status: SHIPPED | OUTPATIENT
Start: 2018-08-01 | End: 2018-12-12 | Stop reason: SDUPTHER

## 2018-08-31 DIAGNOSIS — J30.1 SEASONAL ALLERGIC RHINITIS DUE TO POLLEN: ICD-10-CM

## 2018-08-31 DIAGNOSIS — J45.20 MILD INTERMITTENT ASTHMA WITHOUT COMPLICATION: ICD-10-CM

## 2018-08-31 RX ORDER — MONTELUKAST SODIUM 10 MG/1
TABLET ORAL
Qty: 90 TABLET | Refills: 0 | Status: SHIPPED | OUTPATIENT
Start: 2018-08-31 | End: 2018-11-30 | Stop reason: SDUPTHER

## 2018-09-18 RX ORDER — LANCETS
EACH MISCELLANEOUS
Refills: 4 | OUTPATIENT
Start: 2018-09-18

## 2018-10-31 DIAGNOSIS — J45.20 MILD INTERMITTENT ASTHMA WITHOUT COMPLICATION: ICD-10-CM

## 2018-10-31 DIAGNOSIS — J30.1 SEASONAL ALLERGIC RHINITIS DUE TO POLLEN: ICD-10-CM

## 2018-10-31 RX ORDER — FLUTICASONE PROPIONATE 50 MCG
SPRAY, SUSPENSION (ML) NASAL
Qty: 1 BOTTLE | Refills: 1 | Status: SHIPPED | OUTPATIENT
Start: 2018-10-31 | End: 2020-03-03 | Stop reason: SDUPTHER

## 2018-11-01 RX ORDER — INDAPAMIDE 1.25 MG/1
TABLET, FILM COATED ORAL
Qty: 90 TABLET | Refills: 0 | Status: SHIPPED | OUTPATIENT
Start: 2018-11-01 | End: 2019-01-26 | Stop reason: SDUPTHER

## 2018-11-07 RX ORDER — TERAZOSIN 10 MG/1
CAPSULE ORAL
Qty: 90 CAPSULE | Refills: 1 | Status: SHIPPED | OUTPATIENT
Start: 2018-11-07 | End: 2019-05-19 | Stop reason: SDUPTHER

## 2018-11-10 DIAGNOSIS — E11.9 DIABETES MELLITUS TYPE 2, CONTROLLED, WITHOUT COMPLICATIONS (HCC): ICD-10-CM

## 2018-11-10 RX ORDER — LANCETS 33 GAUGE
EACH MISCELLANEOUS
Qty: 100 EACH | Refills: 4 | Status: SHIPPED | OUTPATIENT
Start: 2018-11-10 | End: 2021-03-31

## 2018-11-30 DIAGNOSIS — J45.20 MILD INTERMITTENT ASTHMA WITHOUT COMPLICATION: ICD-10-CM

## 2018-11-30 DIAGNOSIS — J30.1 SEASONAL ALLERGIC RHINITIS DUE TO POLLEN: ICD-10-CM

## 2018-11-30 RX ORDER — MONTELUKAST SODIUM 10 MG/1
TABLET ORAL
Qty: 90 TABLET | Refills: 0 | Status: SHIPPED | OUTPATIENT
Start: 2018-11-30 | End: 2019-03-02 | Stop reason: SDUPTHER

## 2018-12-14 RX ORDER — ATORVASTATIN CALCIUM 40 MG/1
TABLET, FILM COATED ORAL
Qty: 90 TABLET | Refills: 1 | Status: SHIPPED | OUTPATIENT
Start: 2018-12-14 | End: 2019-07-11 | Stop reason: SDUPTHER

## 2019-01-08 ENCOUNTER — OFFICE VISIT (OUTPATIENT)
Dept: GASTROENTEROLOGY | Facility: CLINIC | Age: 74
End: 2019-01-08

## 2019-01-08 VITALS
SYSTOLIC BLOOD PRESSURE: 172 MMHG | BODY MASS INDEX: 29.91 KG/M2 | DIASTOLIC BLOOD PRESSURE: 61 MMHG | WEIGHT: 191 LBS | HEART RATE: 71 BPM

## 2019-01-08 DIAGNOSIS — K57.92 DIVERTICULITIS: Primary | ICD-10-CM

## 2019-01-08 DIAGNOSIS — D12.5 ADENOMATOUS POLYP OF SIGMOID COLON: ICD-10-CM

## 2019-01-08 PROCEDURE — 99214 OFFICE O/P EST MOD 30 MIN: CPT | Performed by: INTERNAL MEDICINE

## 2019-01-08 NOTE — PROGRESS NOTES
PCP:  Sampson Torres MD     No referring provider defined for this encounter.    Chief Complaint   Patient presents with   • Diverticulitis        HPI   The patient is a 73-year-old gentleman known to me.  He has a history of recurrent diverticulitis.  He seems to have about 3 episodes per year.  Several of these episodes have been documented in the past on CAT scan.  He did have a CAT scan on 4/2/09.  This showed uncomplicated acute diverticulitis of the sigmoid colon.  He had another CAT scan on 10/15/2014.  This showed sigmoid diverticulitis as well.  He did have a colonoscopy most recently in April 2017.  He had 3 small polyps removed.  He was shown to have diverticulosis at that time without evidence of diverticulitis.  He had some adenomatous as well as hyperplastic polyps.Recently, he was treated for acute diverticulitis with ciprofloxacin and Flagyl around the first of the year.  Recent blood work shows a very mild anemia with a hemoglobin of 12.5.  Liver chemistries were normal.    Allergies   Allergen Reactions   • Latex Rash   • Levofloxacin Anaphylaxis   • Sulfa Antibiotics Anaphylaxis   • Beta Adrenergic Blockers Other (See Comments)     Significant bradycardia   • Diovan [Valsartan] Diarrhea   • Entex Lq [Phenylephrine-Guaifenesin] Hives          Current Outpatient Medications:   •  ACCU-CHEK SOFTCLIX LANCETS lancets, 1 each by Other route 3 (Three) Times a Day. Use as instructed, Disp: 100 each, Rfl: 5  •  amLODIPine (NORVASC) 10 MG tablet, Take 1 tablet by mouth Daily., Disp: 90 tablet, Rfl: 2  •  atorvastatin (LIPITOR) 40 MG tablet, TAKE ONE TABLET BY MOUTH DAILY, Disp: 90 tablet, Rfl: 1  •  azelastine (ASTELIN) 0.1 % nasal spray, 2 sprays into each nostril Daily. Use in each nostril as directed, Disp: 1 each, Rfl: 6  •  BD PEN NEEDLE JOHN U/F 32G X 4 MM misc, USE WITH INSULIN PEN FOUR TIMES DAILY, Disp: 300 each, Rfl: 2  •  Blood Glucose Monitoring Suppl (ACCU-CHEK COMPACT CARE KIT) kit, Pt.  Is visually impaired - qualifies for this meter, Disp: 1 each, Rfl: 0  •  clopidogrel (PLAVIX) 75 MG tablet, TAKE ONE TABLET BY MOUTH DAILY, Disp: 90 tablet, Rfl: 3  •  fluticasone (FLONASE) 50 MCG/ACT nasal spray, INSTILL TWO SPRAYS INTO EACH NOSTRIL DAILY, Disp: 1 bottle, Rfl: 1  •  fluticasone-salmeterol (ADVAIR DISKUS) 250-50 MCG/DOSE DISKUS, Inhale 1 puff 2 (Two) Times a Day., Disp: 60 each, Rfl: 11  •  glucose blood (ACCU-CHEK COMPACT TEST DRUM) test strip, Uses X 3 /day, Disp: 102 each, Rfl: 12  •  indapamide (LOZOL) 1.25 MG tablet, TAKE ONE TABLET BY MOUTH DAILY, Disp: 90 tablet, Rfl: 0  •  lisinopril (PRINIVIL,ZESTRIL) 40 MG tablet, TAKE ONE TABLET BY MOUTH DAILY, Disp: 90 tablet, Rfl: 3  •  Loratadine (CLARITIN) 10 MG capsule, Take 10 mg by mouth Daily., Disp: , Rfl:   •  MICRONIZED COLESTIPOL HCL 1 G tablet, 1 g Daily., Disp: , Rfl:   •  montelukast (SINGULAIR) 10 MG tablet, TAKE ONE TABLET BY MOUTH ONCE NIGHTLY, Disp: 90 tablet, Rfl: 0  •  nitroglycerin (NITROSTAT) 0.4 MG SL tablet, Place  under the tongue., Disp: , Rfl:   •  NOVOLOG MIX 70/30 FLEXPEN (70-30) 100 UNIT/ML suspension pen-injector injection, , Disp: , Rfl:   •  omeprazole (PriLOSEC) 40 MG capsule, Take 40 mg by mouth Daily., Disp: , Rfl:   •  ONETOUCH DELICA LANCETS 33G misc, TEST BLOOD SUGAR ONCE DAILY, Disp: 100 each, Rfl: 4  •  terazosin (HYTRIN) 10 MG capsule, TAKE ONE CAPSULE BY MOUTH EVERY NIGHT AT BEDTIME, Disp: 90 capsule, Rfl: 1  •  VENTOLIN  (90 Base) MCG/ACT inhaler, INHALE TWO PUFFS BY MOUTH EVERY 4 HOURS AS NEEDED FOR WHEEZING, Disp: 1 inhaler, Rfl: 11     Past Medical History:   Diagnosis Date   • Allergic rhinitis 9/15/2016   • Blurry vision 9/15/2016   • Cancer (CMS/[facility])    • Carotid artery stenosis       Carotid duplex of 02/25/2014 reports nonobstructive plaque in both proximal internal carotid arteries.   • Coronary artery disease    • CVA (cerebrovascular accident) (CMS/[facility]) 02/24/2014    felt secondary to  accelerated hypertension: a. Acute lacunar infarct. b. 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 c.Subsequent admission, 03/01/2014, for acute on subacute ischemic stroke (right thalamic) with the addition of Plavix. d.  Diabetes, uncontrolled.   • Food poisoning    • GERD (gastroesophageal reflux disease)    • H/O echocardiogram     · A.  Echocardiogram of 02/25/2014 reports an ejection fraction of 55-60%, mild concentric   LVH, trace mitral and pulmonic regurgitation, mild tricuspid regurgitation and calculated   • History of chest x-ray 12/09/2014    No acute chest pathology   • History of chest x-ray 11/18/2012    Compared to previous study of 11/17/2012, [place] Catheter has been removed. Cardiomegaly is noted with central congestion and edema. Also there is volume loss at the bases with bibasilar pulmonary opacities and small effusions. These congestive changes are slightly worse since 11/17/2012   • History of chest x-ray 11/17/2012    [place] catheter remains in right main pulmonary artery. Chest tubes well positioned.There is cardiomegaly with volume loss at both bases. Airspace opacity and consolidation is sharifa at the left base and there is mild central congestion.   • History of echocardiogram 02/25/2014    The estimated EF is 55-60%. Mild concentric LVH observed. Trace MR. Mild TR.    • History of PFTs 09/09/2015    Spirometry data is acceptable and reproducible. Patient gave a good effort   • History of PFTs 01/30/2015    No obstruction. Mild to moderate restriction. No air trapping. Low MVV.Low DLCO which corrects when adjusted for alveolar volumes. FEV1 is decreased by 10mL compared to 02/25/2014 spirometry   • History of PFTs 02/26/2014    Mild to moderate nonspecific reduction of the FEV1 and FVC with a preserved ratio. The FEV1 is 2.13 liters which is 72% of predicted.While technically nonspecific, cannot rule out restriction.    • History of tobacco abuse     with cessation 20 years ago.    • History of transient ischemic attack     in 1994 (right facial drooping and upper extremity weakness).   • Impaired vision    • Obesity    • Oral thrush 9/15/2016   • Polyp of sigmoid colon    • Type 2 diabetes mellitus (CMS/HCC)        Past Surgical History:   Procedure Laterality Date   • CHOLECYSTECTOMY     • CORONARY ARTERY BYPASS GRAFT     • EYE SURGERY     • ROTATOR CUFF REPAIR Right         Social History     Socioeconomic History   • Marital status:      Spouse name: Not on file   • Number of children: Not on file   • Years of education: Not on file   • Highest education level: Not on file   Social Needs   • Financial resource strain: Not on file   • Food insecurity - worry: Not on file   • Food insecurity - inability: Not on file   • Transportation needs - medical: Not on file   • Transportation needs - non-medical: Not on file   Occupational History   • Not on file   Tobacco Use   • Smoking status: Former Smoker     Types: Cigarettes   • Smokeless tobacco: Never Used   Substance and Sexual Activity   • Alcohol use: No   • Drug use: No   • Sexual activity: Defer     Comment: lives with wife   Other Topics Concern   • Not on file   Social History Narrative   • Not on file        Family History   Problem Relation Age of Onset   • Diabetes Other    • Allergic rhinitis Other    • Arthritis Other    • Asthma Other    • Hyperlipidemia Other    • Skin cancer Other    • Heart disease Other    • Colon polyps Other    • Stroke Other    • Hypertension Other    • Diabetes Father    • Heart failure Father    • Diabetes Sister    • Hypertension Sister    • Heart attack Mother 75        Review of Systems   Constitutional: Negative for unexpected weight loss.   HENT: Negative for trouble swallowing.    Eyes: Negative.    Respiratory: Negative.    Gastrointestinal: Negative for abdominal distention, abdominal pain, anal bleeding, blood in stool, constipation, diarrhea, nausea, rectal pain, vomiting, GERD and  indigestion.   Endocrine: Negative.    Genitourinary: Negative.    Musculoskeletal: Negative.    Skin: Negative.    Allergic/Immunologic: Negative.    Neurological: Negative.    Hematological: Negative.    Psychiatric/Behavioral: Negative.         Vitals:    01/08/19 1340   BP: 172/61   Pulse: 71        Physical Exam   General Appearance: Alert, in no acute distress   Head: Normocephalic, without obvious abnormality, atraumatic   Eyes: Lids and lashes normal, conjunctivae and sclerae normal, no icterus, no pallor, corneas clear, PERRLA   Ears: Ears appear intact with no abnormalities noted   Throat: No oral lesions, no thrush, oral mucosa moist   Neck: No adenopathy, supple, trachea midline, no thyromegaly, no JVD   Lungs: Clear to auscultation,respirations regular, even and unlabored Heart: Regular rhythm and normal rate, normal S1 and S2, no murmur, no gallop, no rub, no click   Chest Wall: Symmetrical respiratory expansion   Abdomen: Normal bowel sounds, no masses, no organomegaly, soft non-tender, non-distended, no guarding, no rebound tenderness   Extremities: Moves all extremities well, no edema, no cyanosis, no redness   Skin: No bleeding, bruising or rash   Neurologic: Cranial nerves 2 - 12 grossly intact, no focal deficits       Rashad was seen today for diverticulitis.    Diagnoses and all orders for this visit:    Diverticulitis    Adenomatous polyp of sigmoid colon    Impressions and plan #1 Recurrent diverticulitis: Appears that he has recurrent diverticulitis.  I have 2 CAT scans that I reviewed that show sigmoid diverticulitis.  He has known diverticulosis on colonoscopy.  I doubt it would be in his best interest to repeat a colonoscopy at this point.  He has had problems with recurrent diverticulitis for decades.  We did discuss the natural history of diverticulitis.  If you have one attack, oftentimes we don't have a second attack.  When you have multiple attacks it's very likely that he'll have  future attacks as well.  We talked about the pathophysiology of diverticulitis and that it is a microperforation.  We talked about the fact that if a diverticular attack is severe there can be abscess formation and even need for emergency surgery.  If that is the case, patients typically need a colostomy and then a second operation to reanastomose.  Patients with multiple attacks it's not unreasonable to get them over an attack and have an elective resection of that area.  At this point he prefers to take a watch and wait attitude.  He is going to avoid nuts and popcorn.  If he changes his mind we'll get him to a good surgeon.  He will contact us with difficulties.     Robert Lund MD

## 2019-01-09 PROBLEM — K57.92 DIVERTICULITIS: Status: ACTIVE | Noted: 2019-01-09

## 2019-01-09 PROBLEM — D12.5 ADENOMATOUS POLYP OF SIGMOID COLON: Status: ACTIVE | Noted: 2019-01-09

## 2019-01-14 RX ORDER — LISINOPRIL 40 MG/1
TABLET ORAL
Qty: 90 TABLET | Refills: 0 | Status: SHIPPED | OUTPATIENT
Start: 2019-01-14 | End: 2019-04-20 | Stop reason: SDUPTHER

## 2019-01-28 RX ORDER — INDAPAMIDE 1.25 MG/1
TABLET, FILM COATED ORAL
Qty: 90 TABLET | Refills: 0 | Status: SHIPPED | OUTPATIENT
Start: 2019-01-28 | End: 2019-04-17

## 2019-03-02 DIAGNOSIS — J45.20 MILD INTERMITTENT ASTHMA WITHOUT COMPLICATION: ICD-10-CM

## 2019-03-02 DIAGNOSIS — J30.1 SEASONAL ALLERGIC RHINITIS DUE TO POLLEN: ICD-10-CM

## 2019-03-04 RX ORDER — MONTELUKAST SODIUM 10 MG/1
TABLET ORAL
Qty: 90 TABLET | Refills: 0 | Status: SHIPPED | OUTPATIENT
Start: 2019-03-04 | End: 2019-06-02 | Stop reason: SDUPTHER

## 2019-03-18 RX ORDER — AMLODIPINE BESYLATE 10 MG/1
TABLET ORAL
Qty: 90 TABLET | Refills: 3 | Status: SHIPPED | OUTPATIENT
Start: 2019-03-18 | End: 2020-03-23

## 2019-03-29 ENCOUNTER — OFFICE VISIT (OUTPATIENT)
Dept: PULMONOLOGY | Facility: CLINIC | Age: 74
End: 2019-03-29

## 2019-03-29 VITALS
HEART RATE: 64 BPM | SYSTOLIC BLOOD PRESSURE: 142 MMHG | DIASTOLIC BLOOD PRESSURE: 58 MMHG | WEIGHT: 193.8 LBS | HEIGHT: 65 IN | BODY MASS INDEX: 32.29 KG/M2 | TEMPERATURE: 97.4 F | OXYGEN SATURATION: 98 %

## 2019-03-29 DIAGNOSIS — J45.20 MILD INTERMITTENT ASTHMA WITHOUT COMPLICATION: ICD-10-CM

## 2019-03-29 DIAGNOSIS — J98.4 RESTRICTIVE LUNG DISEASE: Primary | ICD-10-CM

## 2019-03-29 DIAGNOSIS — J30.1 SEASONAL ALLERGIC RHINITIS DUE TO POLLEN: ICD-10-CM

## 2019-03-29 DIAGNOSIS — K21.9 GASTROESOPHAGEAL REFLUX DISEASE WITHOUT ESOPHAGITIS: ICD-10-CM

## 2019-03-29 DIAGNOSIS — G47.33 OBSTRUCTIVE SLEEP APNEA SYNDROME: ICD-10-CM

## 2019-03-29 PROCEDURE — 94375 RESPIRATORY FLOW VOLUME LOOP: CPT | Performed by: NURSE PRACTITIONER

## 2019-03-29 PROCEDURE — 94726 PLETHYSMOGRAPHY LUNG VOLUMES: CPT | Performed by: NURSE PRACTITIONER

## 2019-03-29 PROCEDURE — 99214 OFFICE O/P EST MOD 30 MIN: CPT | Performed by: NURSE PRACTITIONER

## 2019-03-29 PROCEDURE — 94729 DIFFUSING CAPACITY: CPT | Performed by: NURSE PRACTITIONER

## 2019-03-29 RX ORDER — AZELASTINE 1 MG/ML
2 SPRAY, METERED NASAL DAILY
Qty: 3 EACH | Refills: 3 | Status: SHIPPED | OUTPATIENT
Start: 2019-03-29 | End: 2020-03-03 | Stop reason: SDUPTHER

## 2019-03-29 NOTE — PROGRESS NOTES
[facility] Pulmonary Follow up    CHIEF COMPLAINT    Asthma/EVA    HISTORY OF PRESENT ILLNESS    Rashad Mendez is a 74 y.o.male here today for follow-up of his asthma and obstructive sleep apnea.  He was last seen in our office in June.  He states that he has not noticed much change in his breathing since his last appointment.  He denies any respiratory illnesses or exacerbations.  He has noticed that he has more shortness of breath with activity, and uses his rescue inhaler 3 times per week for this.    He remains on his Advair 2 puffs twice a day.  He denies any difficulties with taking this medication.  He states that sometimes he has shortness of breath at random times with and without activity.  He does notice that sometimes he is a chest tightness with these episodes.  He has a history of an irregular heartbeat and follows closely with cardiology.  He is scheduled to see them in April he thinks.    He remains on his CPAP and wears this every night.  Unfortunately he is unable to lay flat in his bed and he has been sleeping in a recliner for several years.  He did not bring his download with him today.  He states that he wears this for an average of 6-8 hours every night.  He is in need of some new CPAP supplies and his current DME company is [employer].    He denies fever, chills, sputum production, hemoptysis, night sweats, weight loss, chest pain or palpitations.  He has issues with chronic sinus drainage and postnasal drip.  He is on Flonase, Claritin, Singulair daily for his allergies.  He states that his symptoms have been worse over the last week.  He denies any reflux symptoms and is currently on omeprazole daily.    He is up-to-date on his current vaccinations.  Patient Active Problem List   Diagnosis   • Coronary artery disease   • Cerebrovascular disease   • Diabetic retinopathy (CMS/[facility])   • Gastroesophageal reflux disease   • Hypertension   • Adiposity   • Obstructive sleep apnea syndrome   •  Peripheral vascular disease (CMS/[facility])   • Acute cerebral infarction (CMS/[facility])   • Allergic rhinitis   • Asthma   • Blurry vision   • Carpal tunnel syndrome   • Diverticulosis   • Dyslipidemia   • Edema   • Irritable bowel syndrome   • Long term current use of insulin (CMS/[facility])   • Oral thrush   • Paresthesia   • Restrictive lung disease   • Type II diabetes mellitus with peripheral circulatory disorder, uncontrolled (CMS/[facility])   • Diverticulitis   • Adenomatous polyp of sigmoid colon       Allergies   Allergen Reactions   • Latex Rash   • Levofloxacin Anaphylaxis   • Sulfa Antibiotics Anaphylaxis   • Beta Adrenergic Blockers Other (See Comments)     Significant bradycardia   • Diovan [Valsartan] Diarrhea   • Entex Lq [Phenylephrine-Guaifenesin] Hives       Current Outpatient Medications:   •  ACCU-CHEK SOFTCLIX LANCETS lancets, 1 each by Other route 3 (Three) Times a Day. Use as instructed, Disp: 100 each, Rfl: 5  •  amLODIPine (NORVASC) 10 MG tablet, TAKE ONE TABLET BY MOUTH DAILY, Disp: 90 tablet, Rfl: 3  •  atorvastatin (LIPITOR) 40 MG tablet, TAKE ONE TABLET BY MOUTH DAILY, Disp: 90 tablet, Rfl: 1  •  azelastine (ASTELIN) 0.1 % nasal spray, 2 sprays into the nostril(s) as directed by provider Daily. Use in each nostril as directed, Disp: 3 each, Rfl: 3  •  BD PEN NEEDLE JOHN U/F 32G X 4 MM misc, USE WITH INSULIN PEN FOUR TIMES DAILY, Disp: 300 each, Rfl: 2  •  Blood Glucose Monitoring Suppl (ACCU-CHEK COMPACT CARE KIT) kit, Pt. Is visually impaired - qualifies for this meter, Disp: 1 each, Rfl: 0  •  clopidogrel (PLAVIX) 75 MG tablet, TAKE ONE TABLET BY MOUTH DAILY, Disp: 90 tablet, Rfl: 3  •  fluticasone (FLONASE) 50 MCG/ACT nasal spray, INSTILL TWO SPRAYS INTO EACH NOSTRIL DAILY, Disp: 1 bottle, Rfl: 1  •  fluticasone-salmeterol (ADVAIR DISKUS) 250-50 MCG/DOSE DISKUS, Inhale 1 puff 2 (Two) Times a Day., Disp: 60 each, Rfl: 11  •  glucose blood (ACCU-CHEK COMPACT TEST DRUM) test strip, Uses X 3 /day, Disp: 102  each, Rfl: 12  •  indapamide (LOZOL) 1.25 MG tablet, TAKE ONE TABLET BY MOUTH DAILY, Disp: 90 tablet, Rfl: 0  •  lisinopril (PRINIVIL,ZESTRIL) 40 MG tablet, TAKE ONE TABLET BY MOUTH DAILY, Disp: 90 tablet, Rfl: 0  •  Loratadine (CLARITIN) 10 MG capsule, Take 10 mg by mouth Daily., Disp: , Rfl:   •  MICRONIZED COLESTIPOL HCL 1 G tablet, 1 g Daily., Disp: , Rfl:   •  montelukast (SINGULAIR) 10 MG tablet, TAKE ONE TABLET BY MOUTH ONCE NIGHTLY, Disp: 90 tablet, Rfl: 0  •  nitroglycerin (NITROSTAT) 0.4 MG SL tablet, Place  under the tongue., Disp: , Rfl:   •  NOVOLOG MIX 70/30 FLEXPEN (70-30) 100 UNIT/ML suspension pen-injector injection, , Disp: , Rfl:   •  omeprazole (PriLOSEC) 40 MG capsule, Take 40 mg by mouth Daily., Disp: , Rfl:   •  ONETOUCH DELICA LANCETS 33G misc, TEST BLOOD SUGAR ONCE DAILY, Disp: 100 each, Rfl: 4  •  terazosin (HYTRIN) 10 MG capsule, TAKE ONE CAPSULE BY MOUTH EVERY NIGHT AT BEDTIME, Disp: 90 capsule, Rfl: 1  •  VENTOLIN  (90 Base) MCG/ACT inhaler, INHALE TWO PUFFS BY MOUTH EVERY 4 HOURS AS NEEDED FOR WHEEZING, Disp: 1 inhaler, Rfl: 11  MEDICATION LIST AND ALLERGIES REVIEWED.    Social History     Tobacco Use   • Smoking status: Former Smoker     Types: Cigarettes   • Smokeless tobacco: Never Used   Substance Use Topics   • Alcohol use: No   • Drug use: No       FAMILY AND SOCIAL HISTORY REVIEWED.    Review of Systems   Constitutional: Positive for chills, fatigue and fever. Negative for activity change, appetite change and unexpected weight change.   HENT: Positive for congestion, postnasal drip, rhinorrhea, sinus pressure, sore throat and voice change.    Eyes: Positive for visual disturbance.   Respiratory: Positive for cough, chest tightness and shortness of breath. Negative for wheezing.    Cardiovascular: Positive for chest pain. Negative for palpitations and leg swelling.   Gastrointestinal: Positive for abdominal pain and diarrhea. Negative for abdominal distention, nausea and  "vomiting.   Endocrine: Negative for cold intolerance and heat intolerance.   Genitourinary: Positive for difficulty urinating and urgency. Negative for dysuria.   Musculoskeletal: Positive for arthralgias, back pain, myalgias and neck pain.   Skin: Negative for color change and pallor.   Allergic/Immunologic: Positive for environmental allergies. Negative for food allergies.   Neurological: Positive for dizziness and light-headedness. Negative for syncope and weakness.   Hematological: Negative for adenopathy. Bruises/bleeds easily.   Psychiatric/Behavioral: Negative for agitation and behavioral problems.   .    /58 (BP Location: Right arm, Patient Position: Sitting, Cuff Size: Adult)   Pulse 64   Temp 97.4 °F (36.3 °C)   Ht 165.1 cm (65\")   Wt 87.9 kg (193 lb 12.8 oz)   SpO2 98% Comment: sitting room air  BMI 32.25 kg/m²     Immunization History   Administered Date(s) Administered   • Flu Vaccine High Dose PF 65YR+ 11/06/2017, 10/29/2018   • Pneumococcal Conjugate 13-Valent (PCV13) 11/06/2017   • influenza Split 12/07/2016       Physical Exam   Constitutional: He is oriented to person, place, and time. He appears well-developed and well-nourished.   HENT:   Head: Normocephalic and atraumatic.   Eyes: Pupils are equal, round, and reactive to light.   Neck: Normal range of motion. Neck supple. No thyromegaly present.   Cardiovascular: Normal rate, regular rhythm, normal heart sounds and intact distal pulses. Exam reveals no gallop and no friction rub.   No murmur heard.  Pulmonary/Chest: Effort normal and breath sounds normal. No respiratory distress. He has no wheezes. He has no rales. He exhibits no tenderness.   Abdominal: Soft. Bowel sounds are normal. There is no tenderness.   Musculoskeletal: Normal range of motion.   Lymphadenopathy:     He has no cervical adenopathy.   Neurological: He is alert and oriented to person, place, and time.   Skin: Skin is warm and dry. Capillary refill takes less than " 2 seconds. He is not diaphoretic.   Psychiatric: He has a normal mood and affect. His behavior is normal.   Nursing note and vitals reviewed.        RESULTS    PFTS in the office today, read by me.    Spirometry Interpretation 03/29/19:    1. No airway obstruction as the FEV1 ratio >= 0.7.  2. The maximum voluntary ventilation (MVV) is normal.   3. TLC 3.32 63% predicted, mild restriction noted  4. DLCO reduced at 58% predicted.    PROBLEM LIST    Problem List Items Addressed This Visit        Respiratory    Obstructive sleep apnea syndrome    Overview     Description: A.  On CPAP therapy daily at bedtime.         Allergic rhinitis    Relevant Medications    azelastine (ASTELIN) 0.1 % nasal spray    Asthma    Restrictive lung disease - Primary    Overview      A. Mild to moderate restriction on pulmonary function testing January 30, 2015. May be      secondary to obesity.           Relevant Medications    azelastine (ASTELIN) 0.1 % nasal spray    Other Relevant Orders    Full Pulmonary Function Test Without Bronchodilator (Completed)       Digestive    Gastroesophageal reflux disease            DISCUSSION    Mr. Mendez was here for a follow-up of his asthma and obstructive sleep apnea.  His asthma remains fairly stable his TLC has decreased since his last testing.  I did advise him to continue Advair daily for his asthma.  He may also use his rescue inhaler more often during the day if he notices shortness of breath.  He has not using this very often currently.    As for his allergies I did recommend that he could see an allergist and to have possible allergy injections but he did not want to do this currently.  He will remain on Flonase, Claritin and Singulair daily for his allergic rhinitis.  I also encouraged him to use Astelin nasal spray in addition to the Flonase and see if this helps his nasal drainage.  I called this in for him today.  He had been told to use this at his last visit but he did not use  this.    He will continue omeprazole for GERD.  We also discussed reflux precautions in the office such as elevating the head of the bed at night, not eating 2-3 hours before bed and avoiding foods that trigger reflux symptoms.    He will continue to wear his CPAP every night for his obstructive sleep apnea.  Advised him to take his download to his current DME company and have them fax over a copy of his download.  He has some issues with his current mask and states that he has a leak at times.  I will look at his current download when I receive it and if his pressures need to be adjusted we will adjust these accordingly.    He will follow-up in 6 months or sooner if his symptoms worsen.  I spent 25 minutes with the patient and family. I spent > 50% percent of this time counseling and discussing diagnosis, prognosis, diagnostic testing, evaluation, current status, treatment options and management.    Gilda Ma, LOUISA  03/29/20193:24 PM  Electronically signed     Please note that portions of this note were completed with a voice recognition program. Efforts were made to edit the dictations, but occasionally words are mistranscribed.      CC: Sampson Torres MD

## 2019-04-17 ENCOUNTER — OFFICE VISIT (OUTPATIENT)
Dept: CARDIOLOGY | Facility: CLINIC | Age: 74
End: 2019-04-17

## 2019-04-17 VITALS
WEIGHT: 192.6 LBS | DIASTOLIC BLOOD PRESSURE: 64 MMHG | HEIGHT: 65 IN | BODY MASS INDEX: 32.09 KG/M2 | SYSTOLIC BLOOD PRESSURE: 136 MMHG | HEART RATE: 57 BPM

## 2019-04-17 DIAGNOSIS — I10 ESSENTIAL HYPERTENSION: ICD-10-CM

## 2019-04-17 DIAGNOSIS — I25.10 CORONARY ARTERY DISEASE INVOLVING NATIVE CORONARY ARTERY OF NATIVE HEART WITHOUT ANGINA PECTORIS: ICD-10-CM

## 2019-04-17 DIAGNOSIS — E78.5 HYPERLIPIDEMIA LDL GOAL <70: Primary | ICD-10-CM

## 2019-04-17 DIAGNOSIS — E78.5 DYSLIPIDEMIA: ICD-10-CM

## 2019-04-17 PROCEDURE — 99214 OFFICE O/P EST MOD 30 MIN: CPT | Performed by: NURSE PRACTITIONER

## 2019-04-17 RX ORDER — NITROGLYCERIN 0.4 MG/1
0.4 TABLET SUBLINGUAL
Qty: 25 TABLET | Refills: 11 | Status: SHIPPED | OUTPATIENT
Start: 2019-04-17 | End: 2022-07-06 | Stop reason: SDUPTHER

## 2019-04-17 NOTE — PROGRESS NOTES
Rashad Mendez  1945  [phone]  Work phone not available    04/17/2019    Sampson Torres MD    Chief Complaint   Patient presents with   • Coronary Artery Disease     Problem List  1. Coronary artery disease:  a. History of extreme fatigue, February 2008.  b. Cardiac catheterization in February 2008:  Status post Liberté 3 mm x 12 mm stent to the OM1, normal LV systolic function.  c. Cardiolite, 10/29/2012:  Positive stress Cardiolite for inducible ischemia in the inferior myocardial segment.  d. Left heart catheterization for anginal symptoms, 11/13/2012, revealing normal LV systolic function and occluded right coronary with left-to-right collaterals, a 70% to 80% ostial left main stenosis, and normal LV systolic function.  e. Coronary artery bypass grafting, 11/16/2012, Dr. Byers:  SVG to the circumflex, SVG to the PDA, LIMA to the LAD.  2. CVA, 02/24/2014, felt secondary to accelerated hypertension:  a. Acute lacunar infarct.  b. 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  c. Subsequent admission, 03/01/2014, for acute on subacute ischemic stroke (right thalamic) with the addition of Plavix.  d. Diabetes, uncontrolled.  3. Hypertension:  a. Bradycardia, on beta-blockers.  Coreg discontinued, 03/01/2014.  4. Hyperlipidemia.  5. Type 2 diabetes mellitus.  6. Gastroesophageal reflux disease.  7. Diverticulitis.  8. Irritable bowel syndrome.  9. Obstructive sleep apnea with CPAP usage at bedtime.  10. Asthma.  11. History of tobacco abuse with cessation 20 years ago.  12. Obesity.  13. History of transient ischemic attack in 1994 (right facial drooping and upper extremity weakness).  14. Surgical history:  a. Status post cholecystectomy.  b. Status post right rotator cuff repair.    Allergies   Allergen Reactions   • Latex Rash   • Levofloxacin Anaphylaxis   • Sulfa Antibiotics Anaphylaxis   • Beta Adrenergic Blockers Other (See Comments)     Significant bradycardia   • Diovan  [Valsartan] Diarrhea   • Entex Lq [Phenylephrine-Guaifenesin] Hives       Current Medications    Current Outpatient Medications:   •  ACCU-CHEK SOFTCLIX LANCETS lancets, 1 each by Other route 3 (Three) Times a Day. Use as instructed, Disp: 100 each, Rfl: 5  •  amLODIPine (NORVASC) 10 MG tablet, TAKE ONE TABLET BY MOUTH DAILY, Disp: 90 tablet, Rfl: 3  •  atorvastatin (LIPITOR) 40 MG tablet, TAKE ONE TABLET BY MOUTH DAILY, Disp: 90 tablet, Rfl: 1  •  azelastine (ASTELIN) 0.1 % nasal spray, 2 sprays into the nostril(s) as directed by provider Daily. Use in each nostril as directed, Disp: 3 each, Rfl: 3  •  BD PEN NEEDLE JOHN U/F 32G X 4 MM misc, USE WITH INSULIN PEN FOUR TIMES DAILY, Disp: 300 each, Rfl: 2  •  Blood Glucose Monitoring Suppl (ACCU-CHEK COMPACT CARE KIT) kit, Pt. Is visually impaired - qualifies for this meter, Disp: 1 each, Rfl: 0  •  clopidogrel (PLAVIX) 75 MG tablet, TAKE ONE TABLET BY MOUTH DAILY, Disp: 90 tablet, Rfl: 3  •  fluticasone (FLONASE) 50 MCG/ACT nasal spray, INSTILL TWO SPRAYS INTO EACH NOSTRIL DAILY, Disp: 1 bottle, Rfl: 1  •  fluticasone-salmeterol (ADVAIR DISKUS) 250-50 MCG/DOSE DISKUS, Inhale 1 puff 2 (Two) Times a Day., Disp: 60 each, Rfl: 11  •  glucose blood (ACCU-CHEK COMPACT TEST DRUM) test strip, Uses X 3 /day, Disp: 102 each, Rfl: 12  •  lisinopril (PRINIVIL,ZESTRIL) 40 MG tablet, TAKE ONE TABLET BY MOUTH DAILY, Disp: 90 tablet, Rfl: 0  •  Loratadine (CLARITIN) 10 MG capsule, Take 10 mg by mouth Daily., Disp: , Rfl:   •  MICRONIZED COLESTIPOL HCL 1 G tablet, 1 g Daily., Disp: , Rfl:   •  montelukast (SINGULAIR) 10 MG tablet, TAKE ONE TABLET BY MOUTH ONCE NIGHTLY, Disp: 90 tablet, Rfl: 0  •  nitroglycerin (NITROSTAT) 0.4 MG SL tablet, Place  under the tongue., Disp: , Rfl:   •  NOVOLOG MIX 70/30 FLEXPEN (70-30) 100 UNIT/ML suspension pen-injector injection, 2 (Two) Times a Day Before Meals., Disp: , Rfl:   •  omeprazole (PriLOSEC) 40 MG capsule, Take 40 mg by mouth Daily., Disp:  ", Rfl:   •  ONETOUCH DELICA LANCETS 33G misc, TEST BLOOD SUGAR ONCE DAILY, Disp: 100 each, Rfl: 4  •  terazosin (HYTRIN) 10 MG capsule, TAKE ONE CAPSULE BY MOUTH EVERY NIGHT AT BEDTIME, Disp: 90 capsule, Rfl: 1  •  VENTOLIN  (90 Base) MCG/ACT inhaler, INHALE TWO PUFFS BY MOUTH EVERY 4 HOURS AS NEEDED FOR WHEEZING, Disp: 1 inhaler, Rfl: 11    History of Present Illness   HPI  Patient is a very pleasant 74-year-old male with the above-noted medical history presents today for annual follow-up of his coronary artery disease, hypertension and hyperlipidemia.  Since he was seen last he has been doing well from a cardiac standpoint.  He denies having any complaints of chest pain, shortness of breath, dyspnea on exertion, edema, fatigue, palpitations, dizziness and syncope.  He states that he does not exercise as much as he should.  He is limited in driving himself anywhere due to vision related issues.  He states that he and his wife are hoping to start walking at the track at their [employer] on a regular basis.  His last lipids were checked 1 year ago.  I have given him an order to check lipids and CMP today.  His blood pressure and heart rate are adequately controlled on current medications.  Overall he is feeling well from a cardiac standpoint.    The following portions of the patient's history were reviewed and updated as appropriate: allergies, current medications and problem list.    Pertinent positives as listed in the HPI.  All other systems reviewed are negative.    Vitals:    04/17/19 1115   BP: 136/64   BP Location: Right arm   Patient Position: Sitting   Pulse: 57   Weight: 87.4 kg (192 lb 9.6 oz)   Height: 165.1 cm (65\")       Physical Exam  GENERAL: well-developed, well-nourished; in no acute distress.   NECK:  Carotid upstrokes are 2+ and  symmetrical without bruits.   LUNGS: Clear to auscultation bilaterally without wheezing, rhonchi, or rales noted.   CARDIOVASCULAR: The heart has a regular rate with a " normal S1 and S2. There is no murmur, gallop, rub, or click appreciated. The PMI is nondisplaced.   NEUROLOGICAL: Nonfocal; Alert and oriented  PERIPHERAL VASCULAR:  Posterior tibial pulses are 2+ and symmetrical. There is no peripheral edema.   SKIN:  Warm and dry  PSYCHIATRIC: normal mood and affect; behavior appropriate    Diagnostic Data:  Procedures    Assessment:      ICD-10-CM ICD-9-CM   1. Hyperlipidemia LDL goal <70 E78.5 272.4   2. Coronary artery disease involving native coronary artery of native heart without angina pectoris I25.10 414.01   3. Essential hypertension I10 401.9   4. Dyslipidemia E78.5 272.4       Plan:  Lipids and CMP soon  Encouraged routine exercise and dietary modifications  Continue Lipitor for hyperlipidemia  Continue Plavix for coronary artery disease  Continue lisinopril and amlodipine for hypertension  Continue all other medications as directed  F/up in 12 months or sooner if needed.

## 2019-04-22 RX ORDER — LISINOPRIL 40 MG/1
TABLET ORAL
Qty: 90 TABLET | Refills: 3 | Status: SHIPPED | OUTPATIENT
Start: 2019-04-22 | End: 2020-05-04

## 2019-05-01 ENCOUNTER — RESULTS ENCOUNTER (OUTPATIENT)
Dept: CARDIOLOGY | Facility: CLINIC | Age: 74
End: 2019-05-01

## 2019-05-01 DIAGNOSIS — E78.5 HYPERLIPIDEMIA LDL GOAL <70: ICD-10-CM

## 2019-05-16 RX ORDER — INDAPAMIDE 1.25 MG/1
TABLET, FILM COATED ORAL
Qty: 90 TABLET | Refills: 0 | Status: SHIPPED | OUTPATIENT
Start: 2019-05-16 | End: 2019-08-14 | Stop reason: SDUPTHER

## 2019-05-20 RX ORDER — TERAZOSIN 10 MG/1
CAPSULE ORAL
Qty: 90 CAPSULE | Refills: 3 | Status: SHIPPED | OUTPATIENT
Start: 2019-05-20 | End: 2020-05-18

## 2019-06-02 DIAGNOSIS — J30.1 SEASONAL ALLERGIC RHINITIS DUE TO POLLEN: ICD-10-CM

## 2019-06-02 DIAGNOSIS — J45.20 MILD INTERMITTENT ASTHMA WITHOUT COMPLICATION: ICD-10-CM

## 2019-06-03 RX ORDER — MONTELUKAST SODIUM 10 MG/1
TABLET ORAL
Qty: 90 TABLET | Refills: 0 | Status: SHIPPED | OUTPATIENT
Start: 2019-06-03 | End: 2019-08-30 | Stop reason: SDUPTHER

## 2019-06-03 RX ORDER — CLOPIDOGREL BISULFATE 75 MG/1
TABLET ORAL
Qty: 90 TABLET | Refills: 3 | Status: SHIPPED | OUTPATIENT
Start: 2019-06-03 | End: 2020-06-01

## 2019-07-11 RX ORDER — ATORVASTATIN CALCIUM 40 MG/1
40 TABLET, FILM COATED ORAL DAILY
Qty: 90 TABLET | Refills: 0 | Status: SHIPPED | OUTPATIENT
Start: 2019-07-11 | End: 2019-10-10 | Stop reason: SDUPTHER

## 2019-08-14 DIAGNOSIS — Z79.899 LONG-TERM USE OF HIGH-RISK MEDICATION: Primary | ICD-10-CM

## 2019-08-14 RX ORDER — INDAPAMIDE 1.25 MG/1
1.25 TABLET, FILM COATED ORAL DAILY
Qty: 90 TABLET | Refills: 0 | Status: SHIPPED | OUTPATIENT
Start: 2019-08-14 | End: 2019-11-11 | Stop reason: SDUPTHER

## 2019-08-30 DIAGNOSIS — J30.1 SEASONAL ALLERGIC RHINITIS DUE TO POLLEN: ICD-10-CM

## 2019-08-30 DIAGNOSIS — J45.20 MILD INTERMITTENT ASTHMA WITHOUT COMPLICATION: ICD-10-CM

## 2019-08-30 RX ORDER — MONTELUKAST SODIUM 10 MG/1
TABLET ORAL
Qty: 90 TABLET | Refills: 0 | Status: SHIPPED | OUTPATIENT
Start: 2019-08-30 | End: 2019-11-24 | Stop reason: SDUPTHER

## 2019-10-10 RX ORDER — ATORVASTATIN CALCIUM 40 MG/1
TABLET, FILM COATED ORAL
Qty: 30 TABLET | Refills: 0 | Status: SHIPPED | OUTPATIENT
Start: 2019-10-10 | End: 2019-11-06 | Stop reason: SDUPTHER

## 2019-11-06 RX ORDER — ATORVASTATIN CALCIUM 40 MG/1
TABLET, FILM COATED ORAL
Qty: 30 TABLET | Refills: 0 | Status: SHIPPED | OUTPATIENT
Start: 2019-11-06 | End: 2019-12-04 | Stop reason: SDUPTHER

## 2019-11-11 RX ORDER — INDAPAMIDE 1.25 MG/1
TABLET, FILM COATED ORAL
Qty: 90 TABLET | Refills: 0 | Status: SHIPPED | OUTPATIENT
Start: 2019-11-11 | End: 2020-02-20

## 2019-11-18 RX ORDER — OMEPRAZOLE 40 MG/1
40 CAPSULE, DELAYED RELEASE ORAL DAILY
Qty: 90 CAPSULE | Refills: 3 | Status: SHIPPED | OUTPATIENT
Start: 2019-11-18 | End: 2020-03-03 | Stop reason: SDUPTHER

## 2019-11-19 DIAGNOSIS — E78.5 DYSLIPIDEMIA: Primary | ICD-10-CM

## 2019-11-24 DIAGNOSIS — J45.20 MILD INTERMITTENT ASTHMA WITHOUT COMPLICATION: ICD-10-CM

## 2019-11-24 DIAGNOSIS — J30.1 SEASONAL ALLERGIC RHINITIS DUE TO POLLEN: ICD-10-CM

## 2019-11-25 RX ORDER — MONTELUKAST SODIUM 10 MG/1
TABLET ORAL
Qty: 90 TABLET | Refills: 0 | Status: SHIPPED | OUTPATIENT
Start: 2019-11-25 | End: 2020-02-20

## 2019-12-04 RX ORDER — ATORVASTATIN CALCIUM 40 MG/1
40 TABLET, FILM COATED ORAL NIGHTLY
Qty: 90 TABLET | Refills: 0 | Status: SHIPPED | OUTPATIENT
Start: 2019-12-04 | End: 2020-03-03

## 2019-12-30 RX ORDER — MONTELUKAST SODIUM 4 MG/1
TABLET, CHEWABLE ORAL
Qty: 120 TABLET | Refills: 11 | Status: SHIPPED | OUTPATIENT
Start: 2019-12-30 | End: 2021-11-15 | Stop reason: SDUPTHER

## 2020-02-07 RX ORDER — INDAPAMIDE 1.25 MG/1
TABLET, FILM COATED ORAL
Qty: 90 TABLET | Refills: 0 | OUTPATIENT
Start: 2020-02-07

## 2020-02-20 DIAGNOSIS — J45.20 MILD INTERMITTENT ASTHMA WITHOUT COMPLICATION: ICD-10-CM

## 2020-02-20 DIAGNOSIS — J30.1 SEASONAL ALLERGIC RHINITIS DUE TO POLLEN: ICD-10-CM

## 2020-02-20 RX ORDER — INDAPAMIDE 1.25 MG/1
TABLET, FILM COATED ORAL
Qty: 90 TABLET | Refills: 0 | Status: SHIPPED | OUTPATIENT
Start: 2020-02-20 | End: 2020-05-18

## 2020-02-20 RX ORDER — MONTELUKAST SODIUM 10 MG/1
TABLET ORAL
Qty: 90 TABLET | Refills: 0 | Status: SHIPPED | OUTPATIENT
Start: 2020-02-20 | End: 2020-03-03 | Stop reason: SDUPTHER

## 2020-03-03 ENCOUNTER — OFFICE VISIT (OUTPATIENT)
Dept: PULMONOLOGY | Facility: CLINIC | Age: 75
End: 2020-03-03

## 2020-03-03 VITALS
HEART RATE: 66 BPM | HEIGHT: 65 IN | TEMPERATURE: 97.9 F | DIASTOLIC BLOOD PRESSURE: 70 MMHG | OXYGEN SATURATION: 97 % | SYSTOLIC BLOOD PRESSURE: 120 MMHG | WEIGHT: 194.2 LBS | BODY MASS INDEX: 32.36 KG/M2

## 2020-03-03 DIAGNOSIS — G47.33 OBSTRUCTIVE SLEEP APNEA SYNDROME: ICD-10-CM

## 2020-03-03 DIAGNOSIS — J45.20 MILD INTERMITTENT ASTHMA WITHOUT COMPLICATION: ICD-10-CM

## 2020-03-03 DIAGNOSIS — J98.4 RESTRICTIVE LUNG DISEASE: ICD-10-CM

## 2020-03-03 DIAGNOSIS — K21.9 GASTROESOPHAGEAL REFLUX DISEASE, ESOPHAGITIS PRESENCE NOT SPECIFIED: ICD-10-CM

## 2020-03-03 DIAGNOSIS — J30.1 SEASONAL ALLERGIC RHINITIS DUE TO POLLEN: ICD-10-CM

## 2020-03-03 DIAGNOSIS — J45.909 ASTHMA, UNSPECIFIED ASTHMA SEVERITY, UNSPECIFIED WHETHER COMPLICATED, UNSPECIFIED WHETHER PERSISTENT: Primary | ICD-10-CM

## 2020-03-03 PROCEDURE — 94375 RESPIRATORY FLOW VOLUME LOOP: CPT | Performed by: NURSE PRACTITIONER

## 2020-03-03 PROCEDURE — 99214 OFFICE O/P EST MOD 30 MIN: CPT | Performed by: NURSE PRACTITIONER

## 2020-03-03 RX ORDER — MONTELUKAST SODIUM 10 MG/1
10 TABLET ORAL NIGHTLY
Qty: 90 TABLET | Refills: 3 | Status: SHIPPED | OUTPATIENT
Start: 2020-03-03 | End: 2020-12-03 | Stop reason: SDUPTHER

## 2020-03-03 RX ORDER — OMEPRAZOLE 40 MG/1
40 CAPSULE, DELAYED RELEASE ORAL DAILY
Qty: 90 CAPSULE | Refills: 3 | Status: SHIPPED | OUTPATIENT
Start: 2020-03-03 | End: 2020-12-03 | Stop reason: SDUPTHER

## 2020-03-03 RX ORDER — AZELASTINE 1 MG/ML
2 SPRAY, METERED NASAL DAILY
Qty: 3 EACH | Refills: 3 | Status: SHIPPED | OUTPATIENT
Start: 2020-03-03 | End: 2020-12-03 | Stop reason: SDUPTHER

## 2020-03-03 RX ORDER — LORATADINE 10 MG/1
10 CAPSULE, LIQUID FILLED ORAL DAILY
Qty: 90 EACH | Refills: 3 | Status: SHIPPED | OUTPATIENT
Start: 2020-03-03 | End: 2020-12-03 | Stop reason: SDUPTHER

## 2020-03-03 RX ORDER — ATORVASTATIN CALCIUM 40 MG/1
TABLET, FILM COATED ORAL
Qty: 30 TABLET | Refills: 0 | Status: SHIPPED | OUTPATIENT
Start: 2020-03-03 | End: 2020-04-01

## 2020-03-03 RX ORDER — ALBUTEROL SULFATE 90 UG/1
2 AEROSOL, METERED RESPIRATORY (INHALATION) EVERY 4 HOURS PRN
Qty: 1 INHALER | Refills: 11 | Status: SHIPPED | OUTPATIENT
Start: 2020-03-03 | End: 2020-12-03 | Stop reason: SDUPTHER

## 2020-03-03 RX ORDER — FLUTICASONE PROPIONATE 50 MCG
2 SPRAY, SUSPENSION (ML) NASAL DAILY
Qty: 1 BOTTLE | Refills: 11 | Status: SHIPPED | OUTPATIENT
Start: 2020-03-03 | End: 2021-06-21

## 2020-03-03 NOTE — PROGRESS NOTES
[facility] Pulmonary Follow up    CHIEF COMPLAINT    Asthma/EVA    HISTORY OF PRESENT ILLNESS    Rashad Mendez is a 75 y.o.male here today for follow-up of his asthma and obstructive sleep apnea.  He was last seen in the office by me in March 2019.  He states that he was recently treated with antibiotics for an upper respiratory infection and just completed his antibiotics yesterday.  He states he continues to feel fatigued and has a congested cough.    He continues to use Advair twice a day and uses albuterol rescue inhaler 1-2 times per day for the last couple of weeks.  He does have shortness of breath with any activity but does recover somewhat quickly at rest.    He continues to wear his CPAP every night for obstructive sleep apnea.  He denies any daytime somnolence or fatigue.  He feels well rested in the mornings.  He averages 6 to 8 hours every night.  He is in need of a new machine and supplies.  His current DME company is [employer].    He denies fever, chills, sputum production, hemoptysis, night sweats, weight loss, chest pain or palpitations.  He has issues with chronic sinus drainage and postnasal drip.  He is on Flonase, Claritin, Singulair daily for his allergies.  He states that his symptoms have been worse over the last week.  He denies any reflux symptoms and is currently on omeprazole daily.    He is up-to-date on his current vaccinations.  Patient Active Problem List   Diagnosis   • Coronary artery disease   • Cerebrovascular disease   • Diabetic retinopathy (CMS/HCC)   • Gastroesophageal reflux disease   • Hypertension   • Adiposity   • Obstructive sleep apnea syndrome   • Peripheral vascular disease (CMS/HCC)   • Acute cerebral infarction (CMS/HCC)   • Allergic rhinitis   • Asthma   • Blurry vision   • Carpal tunnel syndrome   • Diverticulosis   • Dyslipidemia   • Edema   • Irritable bowel syndrome   • Long term current use of insulin (CMS/HCC)   • Oral thrush   • Paresthesia   • Restrictive lung  disease   • Type II diabetes mellitus with peripheral circulatory disorder, uncontrolled (CMS/HCC)   • Diverticulitis   • Adenomatous polyp of sigmoid colon       Allergies   Allergen Reactions   • Latex Rash   • Levofloxacin Anaphylaxis   • Sulfa Antibiotics Anaphylaxis   • Beta Adrenergic Blockers Other (See Comments)     Significant bradycardia   • Diovan [Valsartan] Diarrhea   • Entex Lq [Phenylephrine-Guaifenesin] Hives       Current Outpatient Medications:   •  ACCU-CHEK SOFTCLIX LANCETS lancets, 1 each by Other route 3 (Three) Times a Day. Use as instructed, Disp: 100 each, Rfl: 5  •  amLODIPine (NORVASC) 10 MG tablet, TAKE ONE TABLET BY MOUTH DAILY, Disp: 90 tablet, Rfl: 3  •  atorvastatin (LIPITOR) 40 MG tablet, TAKE ONE TABLET BY MOUTH AT BEDTIME,NEED BLOOD WORK, Disp: 30 tablet, Rfl: 0  •  azelastine (ASTELIN) 0.1 % nasal spray, 2 sprays into the nostril(s) as directed by provider Daily. Use in each nostril as directed, Disp: 3 each, Rfl: 3  •  BD PEN NEEDLE JOHN U/F 32G X 4 MM misc, USE WITH INSULIN PEN FOUR TIMES DAILY, Disp: 300 each, Rfl: 2  •  Blood Glucose Monitoring Suppl (ACCU-CHEK COMPACT CARE KIT) kit, Pt. Is visually impaired - qualifies for this meter, Disp: 1 each, Rfl: 0  •  clopidogrel (PLAVIX) 75 MG tablet, TAKE ONE TABLET BY MOUTH DAILY, Disp: 90 tablet, Rfl: 3  •  colestipol (MICRONIZED COLESTIPOL HCL) 1 g tablet, Take 2 tablets by mouth two times daily, separate 2 hours from other medications., Disp: 120 tablet, Rfl: 11  •  fluticasone (FLONASE) 50 MCG/ACT nasal spray, 2 sprays into the nostril(s) as directed by provider Daily., Disp: 1 bottle, Rfl: 11  •  fluticasone-salmeterol (ADVAIR DISKUS) 250-50 MCG/DOSE DISKUS, Inhale 1 puff 2 (Two) Times a Day., Disp: 60 each, Rfl: 11  •  glucose blood (ACCU-CHEK COMPACT TEST DRUM) test strip, Uses X 3 /day, Disp: 102 each, Rfl: 12  •  indapamide (LOZOL) 1.25 MG tablet, TAKE ONE TABLET BY MOUTH DAILY .... NEEDS LAB WORK, Disp: 90 tablet, Rfl:  0  •  lisinopril (PRINIVIL,ZESTRIL) 40 MG tablet, TAKE ONE TABLET BY MOUTH DAILY, Disp: 90 tablet, Rfl: 3  •  Loratadine (CLARITIN) 10 MG capsule, Take 10 mg by mouth Daily., Disp: 90 each, Rfl: 3  •  montelukast (SINGULAIR) 10 MG tablet, Take 1 tablet by mouth Every Night., Disp: 90 tablet, Rfl: 3  •  nitroglycerin (NITROSTAT) 0.4 MG SL tablet, Place 1 tablet under the tongue Every 5 (Five) Minutes As Needed for Chest Pain., Disp: 25 tablet, Rfl: 11  •  NOVOLOG MIX 70/30 FLEXPEN (70-30) 100 UNIT/ML suspension pen-injector injection, 2 (Two) Times a Day Before Meals., Disp: , Rfl:   •  omeprazole (priLOSEC) 40 MG capsule, Take 1 capsule by mouth Daily., Disp: 90 capsule, Rfl: 3  •  ONETOUCH DELICA LANCETS 33G misc, TEST BLOOD SUGAR ONCE DAILY, Disp: 100 each, Rfl: 4  •  terazosin (HYTRIN) 10 MG capsule, TAKE ONE CAPSULE BY MOUTH EVERY NIGHT AT BEDTIME, Disp: 90 capsule, Rfl: 3  •  VENTOLIN  (90 Base) MCG/ACT inhaler, Inhale 2 puffs Every 4 (Four) Hours As Needed for Wheezing., Disp: 1 inhaler, Rfl: 11  MEDICATION LIST AND ALLERGIES REVIEWED.    Social History     Tobacco Use   • Smoking status: Former Smoker     Types: Cigarettes   • Smokeless tobacco: Never Used   Substance Use Topics   • Alcohol use: No   • Drug use: No       FAMILY AND SOCIAL HISTORY REVIEWED.    Review of Systems   Constitutional: Positive for fatigue. Negative for activity change, appetite change, fever and unexpected weight change.   HENT: Positive for congestion, postnasal drip, rhinorrhea, sinus pressure, sneezing, sore throat and voice change.    Eyes: Negative for visual disturbance.   Respiratory: Positive for cough, chest tightness and shortness of breath. Negative for wheezing.    Cardiovascular: Negative for chest pain, palpitations and leg swelling.   Gastrointestinal: Negative for abdominal distention, abdominal pain, nausea and vomiting.   Endocrine: Negative for cold intolerance and heat intolerance.   Genitourinary:  "Negative for difficulty urinating and urgency.   Musculoskeletal: Positive for arthralgias. Negative for back pain and neck pain.   Skin: Negative for color change and pallor.   Allergic/Immunologic: Negative for environmental allergies and food allergies.   Neurological: Negative for dizziness, syncope, weakness and light-headedness.   Hematological: Negative for adenopathy. Does not bruise/bleed easily.   Psychiatric/Behavioral: Negative for agitation and behavioral problems.   .    /70   Pulse 66   Temp 97.9 °F (36.6 °C)   Ht 165.1 cm (65\")   Wt 88.1 kg (194 lb 3.2 oz)   SpO2 97% Comment: resting, room air  BMI 32.32 kg/m²     Immunization History   Administered Date(s) Administered   • Fluzone High Dose =>65 Years ([employer] ONLY) 11/06/2017, 10/29/2018   • Influenza, Unspecified 10/03/2019   • Pneumococcal Conjugate 13-Valent (PCV13) 11/06/2017   • Td 03/19/1997   • influenza Split 12/07/2016       Physical Exam   Constitutional: He is oriented to person, place, and time. He appears well-developed and well-nourished.   HENT:   Head: Normocephalic and atraumatic.   Eyes: Pupils are equal, round, and reactive to light.   Neck: Normal range of motion. Neck supple. No thyromegaly present.   Cardiovascular: Normal rate, regular rhythm, normal heart sounds and intact distal pulses. Exam reveals no gallop and no friction rub.   No murmur heard.  Pulmonary/Chest: Effort normal and breath sounds normal. No respiratory distress. He has no wheezes. He has no rales. He exhibits no tenderness.   Abdominal: Soft. Bowel sounds are normal. There is no tenderness.   Musculoskeletal: Normal range of motion. He exhibits no edema.   Lymphadenopathy:     He has no cervical adenopathy.   Neurological: He is alert and oriented to person, place, and time.   Skin: Skin is warm and dry. Capillary refill takes less than 2 seconds. He is not diaphoretic.   Psychiatric: He has a normal mood and affect. His behavior is normal. "   Nursing note and vitals reviewed.        RESULTS    PFTS in the office today, read by me.  FVC 2.47 72% predicted, FEV1 2.01 81% predicted, FEV1/FVC 81% predicted, no obstruction noted.    Chest PA/lateral: Official report pending    PROBLEM LIST    Problem List Items Addressed This Visit        Respiratory    Obstructive sleep apnea syndrome    Overview     Description: A.  On CPAP therapy daily at bedtime.         Allergic rhinitis    Relevant Medications    fluticasone (FLONASE) 50 MCG/ACT nasal spray    Loratadine (CLARITIN) 10 MG capsule    montelukast (SINGULAIR) 10 MG tablet    azelastine (ASTELIN) 0.1 % nasal spray    Asthma - Primary    Relevant Medications    fluticasone (FLONASE) 50 MCG/ACT nasal spray    fluticasone-salmeterol (ADVAIR DISKUS) 250-50 MCG/DOSE DISKUS    VENTOLIN  (90 Base) MCG/ACT inhaler    montelukast (SINGULAIR) 10 MG tablet    Other Relevant Orders    XR Chest PA & Lateral    Spirometry Without Bronchodilator (Completed)    Restrictive lung disease    Overview      A. Mild to moderate restriction on pulmonary function testing January 30, 2015. May be      secondary to obesity.           Relevant Medications    fluticasone (FLONASE) 50 MCG/ACT nasal spray    fluticasone-salmeterol (ADVAIR DISKUS) 250-50 MCG/DOSE DISKUS    VENTOLIN  (90 Base) MCG/ACT inhaler    Loratadine (CLARITIN) 10 MG capsule    montelukast (SINGULAIR) 10 MG tablet    azelastine (ASTELIN) 0.1 % nasal spray       Digestive    Gastroesophageal reflux disease    Relevant Medications    omeprazole (priLOSEC) 40 MG capsule            DISCUSSION    Mr. Mendez is here for follow-up of his asthma and obstructive sleep apnea.  We reviewed his PFTs in detail today and he continues to have no obstruction.  He will continue using Advair twice a day for his asthma.  He will continue to use the albuterol as needed for shortness of breath.  He does not appear to be infected in the office today and has completed all of  his antibiotics.  I do not feel that he needs additional antibiotics at this time.  We did review his chest x-ray in the office today and it appears to have no acute pulmonary process.  I will call him if the interpretation of the x-ray is abnormal.  I did advise him that if he were to develop any fevers or colored sputum by the end of the week he could call and I would give him an additional antibiotic to use.  I did advise him that sometimes it takes over 6 to 8 weeks to recover from an upper respiratory infection.    I do suspect that postnasal drip is contributing to his chronic cough.  He will continue Claritin, Singulair, Astelin and Flonase daily for his allergic rhinitis.  I did send in refills of all these today.  I did offer to refer him to an ENT for his postnasal drip or an allergist however he deferred this at this time.    He will continue to wear his CPAP every night for obstructive sleep apnea.  He denies any daytime somnolence or fatigue.  His CPAP machine is currently over 5 years old and he would like a new machine.  I am going to place a new order for a new machine and get his CPAP download for our records.  I will send this into his local [employer] company.    He will continue omeprazole daily for GERD.  We also discussed reflux precaution such as elevating the head of the bed at night, not eating to 3 hours before bed and avoiding foods that trigger reflux symptoms.    He will follow-up in 6 months or sooner if his symptoms worsen he will need full PFTs at his next appointment.  He will call with any additional concerns or questions.  I spent 25 minutes with the patient. I spent > 50% percent of this time counseling and discussing diagnosis, prognosis, diagnostic testing, evaluation, current status, treatment options and management.    Gilda Ma, LOUISA  03/03/20201:31 PM  Electronically signed     Please note that portions of this note were completed with a voice recognition program. Efforts  were made to edit the dictations, but occasionally words are mistranscribed.      CC: Sampson Torres MD

## 2020-03-23 RX ORDER — AMLODIPINE BESYLATE 10 MG/1
TABLET ORAL
Qty: 90 TABLET | Refills: 0 | Status: SHIPPED | OUTPATIENT
Start: 2020-03-23 | End: 2020-06-19

## 2020-03-24 DIAGNOSIS — J45.41 MODERATE PERSISTENT ASTHMA WITH EXACERBATION: Primary | ICD-10-CM

## 2020-03-24 RX ORDER — DOXYCYCLINE HYCLATE 100 MG
100 TABLET ORAL 2 TIMES DAILY
Qty: 20 TABLET | Refills: 0 | Status: SHIPPED | OUTPATIENT
Start: 2020-03-24 | End: 2020-05-04

## 2020-03-24 NOTE — PROGRESS NOTES
Mr. Mendez called stating that he has had more chest congestion and coughing up slight clear sputum.  His last antibiotic was over a month ago.  He states that his chest tightness has worsened over the last week.  He would like an antibiotic called in.  I did offer to call in prednisone at this time he declined this.  I did advise him that if his breathing were to worsen he may need to come into the office or be evaluated by the ED.  He is agreeable with this plan will call with any additional concerns or questions.

## 2020-04-01 RX ORDER — ATORVASTATIN CALCIUM 40 MG/1
TABLET, FILM COATED ORAL
Qty: 90 TABLET | Refills: 0 | Status: SHIPPED | OUTPATIENT
Start: 2020-04-01 | End: 2020-06-30

## 2020-05-01 NOTE — PROGRESS NOTES
[facility] Cardiology  Telehealth follow-up note    Patient ID: Rashad Mendez is a 75 y.o. male.  : 1945   Contact: [phone]     Encounter date: 2020    PCP: Sampson Torres MD      Chief complaint:   Chief Complaint   Patient presents with   • Coronary Artery Disease       Problem List:  1. Coronary artery disease:  a. History of extreme fatigue, 2008.  b. [facility], 2008: S/p Liberté 3 x 12 mm stent to the OM1. Normal EF.  c. Cardiolite, 10/29/2012: Positive stress Cardiolite for inducible ischemia in the inferior myocardial segment.  d. LHC, 2012: Normal EF. Occluded RCA with left-to-right collaterals. 70-80% ostial LM stenosis. Normal EF.  e. CABG, 2012, Dr. Byers: SVG to LCx, SVG to PDA, LIMA to LAD.  f. MPS, 2018: No evidence of inducible ischemia.  2. Cerebrovascular disease  a. History of transient ischemic attack in  (right facial drooping and upper extremity weakness).  b. Acute lacunar infarct, 2014, felt secondary to accelerated hypertension:  c. 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  d. Subsequent admission, 2014, for acute on subacute ischemic stroke (right thalamic) with the addition of Plavix.  3. Hypertension:  a. Bradycardia, on beta-blockers. Coreg discontinued, 2014.  4. Hyperlipidemia.  5. Type 2 diabetes mellitus.  6. Gastroesophageal reflux disease.  7. Diverticulitis.  8. Irritable bowel syndrome.  9. Obstructive sleep apnea with CPAP usage at bedtime.  10. Asthma.  11. History of tobacco abuse with cessation 20 years ago.  12. Obesity.  13. Surgical history:  a. Status post cholecystectomy.  b. Status post right rotator cuff repair.    Allergies   Allergen Reactions   • Latex Rash   • Levofloxacin Anaphylaxis   • Sulfa Antibiotics Anaphylaxis   • Beta Adrenergic Blockers Other (See Comments)     Significant bradycardia   • Diovan [Valsartan] Diarrhea   • Entex Lq  [Phenylephrine-Guaifenesin] Hives       Current Medications:    Current Outpatient Medications:   •  ACCU-CHEK SOFTCLIX LANCETS lancets, 1 each by Other route 3 (Three) Times a Day. Use as instructed, Disp: 100 each, Rfl: 5  •  amLODIPine (NORVASC) 10 MG tablet, TAKE ONE TABLET BY MOUTH DAILY, Disp: 90 tablet, Rfl: 0  •  atorvastatin (LIPITOR) 40 MG tablet, TAKE ONE TABLET BY MOUTH EVERY NIGHT AT BEDTIME, Disp: 90 tablet, Rfl: 0  •  azelastine (ASTELIN) 0.1 % nasal spray, 2 sprays into the nostril(s) as directed by provider Daily. Use in each nostril as directed, Disp: 3 each, Rfl: 3  •  BD PEN NEEDLE JOHN U/F 32G X 4 MM misc, USE WITH INSULIN PEN FOUR TIMES DAILY, Disp: 300 each, Rfl: 2  •  Blood Glucose Monitoring Suppl (ACCU-CHEK COMPACT CARE KIT) kit, Pt. Is visually impaired - qualifies for this meter, Disp: 1 each, Rfl: 0  •  clopidogrel (PLAVIX) 75 MG tablet, TAKE ONE TABLET BY MOUTH DAILY, Disp: 90 tablet, Rfl: 3  •  colestipol (MICRONIZED COLESTIPOL HCL) 1 g tablet, Take 2 tablets by mouth two times daily, separate 2 hours from other medications., Disp: 120 tablet, Rfl: 11  •  fluticasone (FLONASE) 50 MCG/ACT nasal spray, 2 sprays into the nostril(s) as directed by provider Daily., Disp: 1 bottle, Rfl: 11  •  fluticasone-salmeterol (ADVAIR DISKUS) 250-50 MCG/DOSE DISKUS, Inhale 1 puff 2 (Two) Times a Day., Disp: 60 each, Rfl: 11  •  glucose blood (ACCU-CHEK COMPACT TEST DRUM) test strip, Uses X 3 /day, Disp: 102 each, Rfl: 12  •  indapamide (LOZOL) 1.25 MG tablet, TAKE ONE TABLET BY MOUTH DAILY .... NEEDS LAB WORK, Disp: 90 tablet, Rfl: 0  •  lisinopril (PRINIVIL,ZESTRIL) 40 MG tablet, TAKE ONE TABLET BY MOUTH DAILY, Disp: 90 tablet, Rfl: 2  •  Loratadine (CLARITIN) 10 MG capsule, Take 10 mg by mouth Daily., Disp: 90 each, Rfl: 3  •  montelukast (SINGULAIR) 10 MG tablet, Take 1 tablet by mouth Every Night., Disp: 90 tablet, Rfl: 3  •  nitroglycerin (NITROSTAT) 0.4 MG SL tablet, Place 1 tablet under the  "tongue Every 5 (Five) Minutes As Needed for Chest Pain., Disp: 25 tablet, Rfl: 11  •  NOVOLOG MIX 70/30 FLEXPEN (70-30) 100 UNIT/ML suspension pen-injector injection, 2 (Two) Times a Day Before Meals., Disp: , Rfl:   •  omeprazole (priLOSEC) 40 MG capsule, Take 1 capsule by mouth Daily., Disp: 90 capsule, Rfl: 3  •  ONETOUCH DELICA LANCETS 33G misc, TEST BLOOD SUGAR ONCE DAILY, Disp: 100 each, Rfl: 4  •  terazosin (HYTRIN) 10 MG capsule, TAKE ONE CAPSULE BY MOUTH EVERY NIGHT AT BEDTIME, Disp: 90 capsule, Rfl: 3  •  VENTOLIN  (90 Base) MCG/ACT inhaler, Inhale 2 puffs Every 4 (Four) Hours As Needed for Wheezing., Disp: 1 inhaler, Rfl: 11    HPI    Rashad Mendez is a 75 y.o. male who has a telephone visit today for an annual follow up of his coronary artery disease and cardiac risk factors. Since last visit, Casey has been doing well.  He is not doing any routine exercise but he is mowing the grass and doing some weed eating.  He also plays with his grandchildren down the street.  He denies chest pain and shortness of breath except for when his allergies bother him.  He has had no lower extremity edema.  His blood pressure usually is in the 120-130 systolic range at home.  He needs a refill on his nitroglycerin and lisinopril.      The following portions of the patient's history were reviewed and updated as appropriate: allergies, current medications and problem list.    Pertinent positives as listed in the HPI.  All other systems reviewed are negative.         Vitals:    05/04/20 1446   BP: 141/54   BP Location: Left arm   Patient Position: Sitting   Pulse: 64   Weight: 85.7 kg (189 lb)   Height: 170.2 cm (67\")       Physical Exam:      Oriented to time place and person   Recent and remote memory is intact  Hearing is normal  Respiratory effort is normal  Judgment and insight is normal  Appropriate mood and affect      Diagnostic Data (reviewed with patient):    03/10/2020  Lipid Panel:      HDL " 34  LDL 47  CMP: Glu 223 (H), otherwise normal CMP    Procedures      Assessment:    ICD-10-CM ICD-9-CM   1. Coronary artery disease involving native coronary artery of native heart without angina pectoris I25.10 414.01   2. Essential hypertension I10 401.9   3. Dyslipidemia E78.5 272.4         Plan:  1. Continue atorvastatin for hyperlipidemia and coronary disease  2. Continue lisinopril and terazosin and amlodipine for hypertension  3. Refill nitroglycerin   4. Continue clopidogrel for coronary artery disease  5. Continue all other current medications.  6. F/up in 12 months, sooner if needed.      This patient has consented to a telehealth visit via telephone. The visit was scheduled as a follow-up visit to comply with patient safety concerns in accordance with CDC recommendations.  All vitals recorded within this visit are reported by the patient.  I spent 11 minutes in total including but not limited to the 6 minutes spent in direct conversation with this patient.       Ofelia Sexton MD, FACC

## 2020-05-04 ENCOUNTER — OFFICE VISIT (OUTPATIENT)
Dept: CARDIOLOGY | Facility: CLINIC | Age: 75
End: 2020-05-04

## 2020-05-04 VITALS
SYSTOLIC BLOOD PRESSURE: 141 MMHG | WEIGHT: 189 LBS | HEIGHT: 67 IN | DIASTOLIC BLOOD PRESSURE: 54 MMHG | BODY MASS INDEX: 29.66 KG/M2 | HEART RATE: 64 BPM

## 2020-05-04 DIAGNOSIS — I10 ESSENTIAL HYPERTENSION: ICD-10-CM

## 2020-05-04 DIAGNOSIS — I25.10 CORONARY ARTERY DISEASE INVOLVING NATIVE CORONARY ARTERY OF NATIVE HEART WITHOUT ANGINA PECTORIS: Primary | ICD-10-CM

## 2020-05-04 DIAGNOSIS — E78.5 DYSLIPIDEMIA: ICD-10-CM

## 2020-05-04 PROCEDURE — 99442 PR PHYS/QHP TELEPHONE EVALUATION 11-20 MIN: CPT | Performed by: INTERNAL MEDICINE

## 2020-05-04 RX ORDER — LISINOPRIL 40 MG/1
TABLET ORAL
Qty: 90 TABLET | Refills: 2 | Status: SHIPPED | OUTPATIENT
Start: 2020-05-04 | End: 2021-01-28

## 2020-05-18 RX ORDER — INDAPAMIDE 1.25 MG/1
TABLET, FILM COATED ORAL
Qty: 90 TABLET | Refills: 0 | Status: SHIPPED | OUTPATIENT
Start: 2020-05-18 | End: 2021-02-15

## 2020-05-18 RX ORDER — INDAPAMIDE 1.25 MG/1
TABLET, FILM COATED ORAL
Qty: 90 TABLET | Refills: 1 | Status: SHIPPED | OUTPATIENT
Start: 2020-05-18 | End: 2020-05-18

## 2020-05-18 RX ORDER — TERAZOSIN 10 MG/1
CAPSULE ORAL
Qty: 90 CAPSULE | Refills: 3 | Status: SHIPPED | OUTPATIENT
Start: 2020-05-18 | End: 2021-05-18

## 2020-06-01 RX ORDER — CLOPIDOGREL BISULFATE 75 MG/1
TABLET ORAL
Qty: 90 TABLET | Refills: 3 | Status: SHIPPED | OUTPATIENT
Start: 2020-06-01 | End: 2021-06-03

## 2020-06-15 RX ORDER — INSULIN ASPART 100 [IU]/ML
INJECTION, SUSPENSION SUBCUTANEOUS
Qty: 30 PEN | Refills: 0 | Status: SHIPPED | OUTPATIENT
Start: 2020-06-15 | End: 2020-07-28 | Stop reason: SDUPTHER

## 2020-06-15 NOTE — TELEPHONE ENCOUNTER
Pt is requesting a refill, previous patient of yours. Pt is scheduled for appointment on 7/22 with Dr Mitchell. I did make pt aware we could only refill until that appointment. Pt is requesting to possibly get a 90 day supply because of insurance. Please advise. Thanks!

## 2020-06-19 RX ORDER — AMLODIPINE BESYLATE 10 MG/1
TABLET ORAL
Qty: 90 TABLET | Refills: 3 | Status: SHIPPED | OUTPATIENT
Start: 2020-06-19 | End: 2021-06-18

## 2020-06-22 RX ORDER — PEN NEEDLE, DIABETIC 32GX 5/32"
NEEDLE, DISPOSABLE MISCELLANEOUS
Qty: 200 EACH | Refills: 2 | Status: SHIPPED | OUTPATIENT
Start: 2020-06-22 | End: 2020-08-26 | Stop reason: SDUPTHER

## 2020-06-30 RX ORDER — ATORVASTATIN CALCIUM 40 MG/1
TABLET, FILM COATED ORAL
Qty: 90 TABLET | Refills: 0 | Status: SHIPPED | OUTPATIENT
Start: 2020-06-30 | End: 2020-09-29

## 2020-07-27 DIAGNOSIS — E11.9 CONTROLLED TYPE 2 DIABETES MELLITUS WITHOUT COMPLICATION, WITH LONG-TERM CURRENT USE OF INSULIN (HCC): Primary | ICD-10-CM

## 2020-07-27 DIAGNOSIS — E11.69 TYPE 2 DIABETES MELLITUS WITH OTHER SPECIFIED COMPLICATION, WITH LONG-TERM CURRENT USE OF INSULIN (HCC): ICD-10-CM

## 2020-07-27 DIAGNOSIS — Z79.4 TYPE 2 DIABETES MELLITUS WITH OTHER SPECIFIED COMPLICATION, WITH LONG-TERM CURRENT USE OF INSULIN (HCC): ICD-10-CM

## 2020-07-27 DIAGNOSIS — Z79.4 CONTROLLED TYPE 2 DIABETES MELLITUS WITHOUT COMPLICATION, WITH LONG-TERM CURRENT USE OF INSULIN (HCC): Primary | ICD-10-CM

## 2020-07-28 NOTE — TELEPHONE ENCOUNTER
LVM to have pt call back regarding refill request. Per last refill pt was made aware he had to keep his appointment because we could only refill until that apt since he has not been to this office yet. I do see pt rescheduled to 8/26 but we need to try to get him in sooner. Pt needs to know that he will only get enough medication until that appt.

## 2020-07-30 ENCOUNTER — TELEPHONE (OUTPATIENT)
Dept: ENDOCRINOLOGY | Facility: CLINIC | Age: 75
End: 2020-07-30

## 2020-08-26 ENCOUNTER — OFFICE VISIT (OUTPATIENT)
Dept: ENDOCRINOLOGY | Facility: CLINIC | Age: 75
End: 2020-08-26

## 2020-08-26 VITALS
OXYGEN SATURATION: 97 % | HEART RATE: 57 BPM | WEIGHT: 189.2 LBS | DIASTOLIC BLOOD PRESSURE: 50 MMHG | SYSTOLIC BLOOD PRESSURE: 98 MMHG | BODY MASS INDEX: 29.7 KG/M2 | HEIGHT: 67 IN

## 2020-08-26 DIAGNOSIS — Z79.4 LONG TERM CURRENT USE OF INSULIN (HCC): ICD-10-CM

## 2020-08-26 DIAGNOSIS — E11.69 TYPE 2 DIABETES MELLITUS WITH OTHER SPECIFIED COMPLICATION, WITH LONG-TERM CURRENT USE OF INSULIN (HCC): Primary | ICD-10-CM

## 2020-08-26 DIAGNOSIS — E16.2 HYPOGLYCEMIA: ICD-10-CM

## 2020-08-26 DIAGNOSIS — Z79.4 TYPE 2 DIABETES MELLITUS WITH OTHER SPECIFIED COMPLICATION, WITH LONG-TERM CURRENT USE OF INSULIN (HCC): Primary | ICD-10-CM

## 2020-08-26 LAB
GLUCOSE BLDC GLUCOMTR-MCNC: 155 MG/DL (ref 70–130)
HBA1C MFR BLD: 6.6 %

## 2020-08-26 PROCEDURE — 99214 OFFICE O/P EST MOD 30 MIN: CPT | Performed by: INTERNAL MEDICINE

## 2020-08-26 PROCEDURE — 82947 ASSAY GLUCOSE BLOOD QUANT: CPT | Performed by: INTERNAL MEDICINE

## 2020-08-26 PROCEDURE — 83036 HEMOGLOBIN GLYCOSYLATED A1C: CPT | Performed by: INTERNAL MEDICINE

## 2020-08-26 RX ORDER — FLASH GLUCOSE SENSOR
1 KIT MISCELLANEOUS
Qty: 2 EACH | Refills: 6 | Status: SHIPPED | OUTPATIENT
Start: 2020-08-26 | End: 2021-03-31

## 2020-08-26 NOTE — PROGRESS NOTES
"Chief Complaint   Patient presents with   • Diabetes     Follow up Type 2 Diabetes - previous patient         HPI   Rashad Mendez is a 75 y.o. male had concerns including Diabetes (Follow up Type 2 Diabetes - previous patient ).    He is checking blood sugars almost never. Reports low BGs 1 time weekly on average. Low BGs vary in timing. Seems to be more in the mornings.   A1c is 6.6 today.   Current medications for diabetes include mixed insulin 42 units twice daily. Misses a dose rarely - 1-2 times per month.   He isn't on metformin due to GI intolerance.   Hasn't tried an SGLT-2 inhibitor due to concern about side effects. Pt has a history of heart disease.     A1c is 6.6 today. . He hasn't eaten or taken insulin.     Vision has gotten worse recently - has some technical difficulties checking BGs and taking insulin.   Last ophtho-exam was over a year ago.    The following portions of the patient's history were reviewed and updated as appropriate: allergies, current medications, past family history, past medical history, past social history, past surgical history and problem list.    Review of Systems   Constitutional: Negative.    HENT: Positive for congestion and sinus pressure.    Eyes: Positive for visual disturbance. Negative for pain.   Respiratory: Negative.    Cardiovascular: Negative.    Gastrointestinal: Negative.    Endocrine: Negative.    Genitourinary: Negative.    Musculoskeletal: Positive for back pain and myalgias.   Skin: Positive for bruise. Negative for dry skin.   Allergic/Immunologic: Negative.    Neurological: Negative.    Hematological: Negative.    Psychiatric/Behavioral: Negative.       ROS was reviewed and verified. All other ROS negative.    BP 98/50 (BP Location: Right arm, Patient Position: Sitting, Cuff Size: Adult)   Pulse 57   Ht 170.2 cm (67\")   Wt 85.8 kg (189 lb 3.2 oz)   SpO2 97%   BMI 29.63 kg/m²      Physical Exam     Constitutional:  well developed; well " nourished  no acute distress  obese - Body mass index is 29.63 kg/m².   ENT/Thyroid: no thyromegaly  no palpable nodules   Eyes: EOM intact  Conjunctiva: clear   Respiratory:  breathing is unlabored  clear to auscultation bilaterally   Cardiovascular:  regular rate and rhythm, S1, S2 normal, no murmur, click, rub or gallop   Chest:  Not performed.   Abdomen: Not performed.   : Not performed.   Musculoskeletal: negative findings:  ROM of all joints is normal, no deformities present   Skin: dry and warm   Neuro: normal without focal findings and mental status, speech normal, alert and oriented x3   Psych: oriented to time, place and person, mood and affect are within normal limits     CMP:  Lab Results   Component Value Date     (H) 12/07/2016    BUN 18 12/07/2016    CREATININE 1.20 12/07/2016    EGFRIFNONA 60 (L) 12/07/2016    BCR 15.0 12/07/2016     12/07/2016    K 4.3 12/07/2016    CO2 34.0 (H) 12/07/2016    CALCIUM 9.6 12/07/2016    PROTENTOTREF 7.2 01/10/2018    ALBUMIN 4.20 01/10/2018    BILITOT 0.8 01/10/2018    ALKPHOS 96 01/10/2018    AST 25 01/10/2018    ALT 31 01/10/2018     Lipid Panel:  Lab Results   Component Value Date    TRIG 234 (H) 01/10/2018    HDL 34 (L) 01/10/2018    VLDL 46.8 01/10/2018    LDL 61 01/10/2018     HbA1c:  Lab Results   Component Value Date    HGBA1C 6.6 08/26/2020    HGBA1C 7.2 12/07/2016     Glucose:    Lab Results   Component Value Date    POCGLU 155 (A) 08/26/2020     TSH:  Lab Results   Component Value Date    TSH 2.004 12/07/2016       Assessment and Plan    Rashad was seen today for diabetes.    Diagnoses and all orders for this visit:    Type 2 diabetes mellitus with other specified complication, with long-term current use of insulin (CMS/[facility])  Mostly controlled with A1c 6.6 though patient is experiencing hypoglycemia on occasion.  Complicated by retinopathy.   Records have been requested.   No metformin due to history of GI intolerance.  Continue mixed insulin  "but decrease to 36 units twice daily.  Add Jardiance 10 mg daily.  Samples were given today.  If tolerated increase to 25 mg daily and titrate down on insulin as able.  Patient has a history of CAD and therefore Jardiance is an excellent option.  Cautioned regarding possible UTI or yeast infection.  He must check his blood sugars twice daily.  Prescription for leo sensor was sent to the pharmacy.  Labs are up-to-date from 3/10/2020 including lipid and CMP.  Ophtho exam is overdue and pt was encouraged to schedule.  -     POC Glucose  -     POC Glycosylated Hemoglobin (Hb A1C)  -     Continuous Blood Gluc  (FREESTYLE LEO 2 READER SYSTM) device; 1 each Daily.  -     Continuous Blood Gluc Sensor (FREESTYLE LEO 14 DAY SENSOR) misc; 1 each Every 14 (Fourteen) Days.  -     Discontinue: Empagliflozin (Jardiance) 10 MG tablet; Take 10 mg by mouth Daily.  -     Microalbumin / Creatinine Urine Ratio - Urine, Clean Catch  -     insulin aspart protamine & aspart (NOVOLOG) 70/30 100 unit/mL; 36 units twice daily before meals  -     Insulin Pen Needle (BD Pen Needle Melody U/F) 32G X 4 MM misc; Inject 1 each under the skin into the appropriate area as directed 2 (two) times a day.  -     Empagliflozin (Jardiance) 10 MG tablet; Take 10 mg by mouth Daily.    Long term current use of insulin (CMS/[facility])  Due to long-term use of insulin twice daily, it is necessary that he monitor his blood sugars twice daily.  Due to his vision impairments he has some difficulty with checking BG's.  Prescription for leo was sent as above.    Hypoglycemia  Patient has hypoglycemia due to taking insulin \"blindly\".  Sometimes also due to delayed meals.  Leo sensor sent as above.  Continue to titrate down on insulin until BG's are no longer dropping below 100 often.       Return in about 5 months (around 1/26/2021) for next scheduled follow up. The patient was instructed to contact the clinic with any interval questions or " concerns.    Dorita Mitchell, DO   Endocrinologist    Please note that portions of this document were completed with a voice recognition program. Efforts were made to edit the dictations, but occasionally words are mis-transcribed.

## 2020-08-27 LAB
ALBUMIN/CREAT UR: 100 MG/G CREAT (ref 0–29)
CREAT UR-MCNC: 75 MG/DL
MICROALBUMIN UR-MCNC: 75.3 UG/ML

## 2020-09-07 DIAGNOSIS — E11.69 TYPE 2 DIABETES MELLITUS WITH OTHER SPECIFIED COMPLICATION, WITH LONG-TERM CURRENT USE OF INSULIN (HCC): ICD-10-CM

## 2020-09-07 DIAGNOSIS — Z79.4 TYPE 2 DIABETES MELLITUS WITH OTHER SPECIFIED COMPLICATION, WITH LONG-TERM CURRENT USE OF INSULIN (HCC): ICD-10-CM

## 2020-09-29 RX ORDER — ATORVASTATIN CALCIUM 40 MG/1
TABLET, FILM COATED ORAL
Qty: 90 TABLET | Refills: 1 | Status: SHIPPED | OUTPATIENT
Start: 2020-09-29 | End: 2021-04-05

## 2020-12-03 ENCOUNTER — OFFICE VISIT (OUTPATIENT)
Dept: PULMONOLOGY | Facility: CLINIC | Age: 75
End: 2020-12-03

## 2020-12-03 VITALS
HEART RATE: 75 BPM | SYSTOLIC BLOOD PRESSURE: 142 MMHG | OXYGEN SATURATION: 98 % | BODY MASS INDEX: 29.91 KG/M2 | DIASTOLIC BLOOD PRESSURE: 60 MMHG | HEIGHT: 67 IN | TEMPERATURE: 97.8 F | WEIGHT: 190.6 LBS

## 2020-12-03 DIAGNOSIS — J30.1 SEASONAL ALLERGIC RHINITIS DUE TO POLLEN: ICD-10-CM

## 2020-12-03 DIAGNOSIS — K21.9 GASTROESOPHAGEAL REFLUX DISEASE: ICD-10-CM

## 2020-12-03 DIAGNOSIS — J45.20 MILD INTERMITTENT ASTHMA WITHOUT COMPLICATION: ICD-10-CM

## 2020-12-03 PROCEDURE — 99214 OFFICE O/P EST MOD 30 MIN: CPT | Performed by: NURSE PRACTITIONER

## 2020-12-03 RX ORDER — LORATADINE 10 MG/1
10 CAPSULE, LIQUID FILLED ORAL DAILY
Qty: 90 EACH | Refills: 3 | Status: SHIPPED | OUTPATIENT
Start: 2020-12-03

## 2020-12-03 RX ORDER — OMEPRAZOLE 40 MG/1
40 CAPSULE, DELAYED RELEASE ORAL DAILY
Qty: 90 CAPSULE | Refills: 3 | Status: SHIPPED | OUTPATIENT
Start: 2020-12-03 | End: 2021-12-09 | Stop reason: SDUPTHER

## 2020-12-03 RX ORDER — ALBUTEROL SULFATE 90 UG/1
2 AEROSOL, METERED RESPIRATORY (INHALATION) EVERY 4 HOURS PRN
Qty: 8 G | Refills: 5 | Status: SHIPPED | OUTPATIENT
Start: 2020-12-03 | End: 2021-06-04 | Stop reason: SDUPTHER

## 2020-12-03 RX ORDER — MONTELUKAST SODIUM 10 MG/1
10 TABLET ORAL NIGHTLY
Qty: 90 TABLET | Refills: 3 | Status: SHIPPED | OUTPATIENT
Start: 2020-12-03 | End: 2021-12-09 | Stop reason: SDUPTHER

## 2020-12-03 RX ORDER — AZELASTINE 1 MG/ML
2 SPRAY, METERED NASAL DAILY
Qty: 3 EACH | Refills: 3 | Status: SHIPPED | OUTPATIENT
Start: 2020-12-03 | End: 2021-05-05

## 2020-12-03 NOTE — PROGRESS NOTES
[facility] Pulmonary Follow up    CHIEF COMPLAINT    Asthma/EVA    HISTORY OF PRESENT ILLNESS    Rashad Mendez is a 75 y.o.male here today for follow-up of his asthma and EVA.  He was last seen in the office by me in March.  He states that he was recently treated for an upper respiratory infection a month ago by his PCP.    He continues to use his Advair twice a day and uses his albuterol once daily.  He does have some shortness of breath with activity but does recover quickly at rest.    He continues to wear his CPAP every night for EVA.  He denies any daytime somnolence or fatigue.  He averages 6 to 8 hours.  He did bring his download with him today.  He is requesting a new machine as his machine is over 5 years old.    He denies fever, chills, sputum production, hemoptysis, night sweats, weight loss, chest pain or palpitations.  He has issues with chronic sinus drainage and postnasal drip.  He is on Flonase, Claritin, Singulair daily for his allergies.  He states that his symptoms have been worse over the last week.  He denies any reflux symptoms and is currently on omeprazole daily.     He is up-to-date on his current vaccinations.    Patient Active Problem List   Diagnosis   • Coronary artery disease   • Cerebrovascular disease   • Diabetic retinopathy (CMS/HCC)   • Gastroesophageal reflux disease   • Hypertension   • Adiposity   • Obstructive sleep apnea syndrome   • Peripheral vascular disease (CMS/HCC)   • Acute cerebral infarction (CMS/HCC)   • Allergic rhinitis   • Asthma   • Blurry vision   • Carpal tunnel syndrome   • Diverticulosis   • Dyslipidemia   • Edema   • Irritable bowel syndrome   • Long term current use of insulin (CMS/HCC)   • Oral thrush   • Paresthesia   • Restrictive lung disease   • Type II diabetes mellitus with peripheral circulatory disorder, uncontrolled (CMS/HCC)   • Diverticulitis   • Adenomatous polyp of sigmoid colon       Allergies   Allergen Reactions   • Latex Rash   • Levofloxacin  Anaphylaxis   • Sulfa Antibiotics Anaphylaxis   • Beta Adrenergic Blockers Other (See Comments)     Significant bradycardia   • Diovan [Valsartan] Diarrhea   • Entex Lq [Phenylephrine-Guaifenesin] Hives       Current Outpatient Medications:   •  ACCU-CHEK SOFTCLIX LANCETS lancets, 1 each by Other route 3 (Three) Times a Day. Use as instructed, Disp: 100 each, Rfl: 5  •  amLODIPine (NORVASC) 10 MG tablet, TAKE ONE TABLET BY MOUTH DAILY, Disp: 90 tablet, Rfl: 3  •  atorvastatin (LIPITOR) 40 MG tablet, TAKE ONE TABLET BY MOUTH EVERY NIGHT AT BEDTIME - NEED APPOINTMENT FOR ADDITIONAL REFILLS, Disp: 90 tablet, Rfl: 1  •  azelastine (ASTELIN) 0.1 % nasal spray, 2 sprays into the nostril(s) as directed by provider Daily. Use in each nostril as directed, Disp: 3 each, Rfl: 3  •  Blood Glucose Monitoring Suppl (ACCU-CHEK COMPACT CARE KIT) kit, Pt. Is visually impaired - qualifies for this meter, Disp: 1 each, Rfl: 0  •  clopidogrel (PLAVIX) 75 MG tablet, TAKE ONE TABLET BY MOUTH DAILY, Disp: 90 tablet, Rfl: 3  •  colestipol (MICRONIZED COLESTIPOL HCL) 1 g tablet, Take 2 tablets by mouth two times daily, separate 2 hours from other medications., Disp: 120 tablet, Rfl: 11  •  Continuous Blood Gluc  (FREESTYLE LEO 2 READER SYSTM) device, 1 each Daily., Disp: 1 each, Rfl: 0  •  Continuous Blood Gluc Sensor (FREESTYLE LEO 14 DAY SENSOR) mis, 1 each Every 14 (Fourteen) Days., Disp: 2 each, Rfl: 6  •  Empagliflozin (Jardiance) 10 MG tablet, Take 10 mg by mouth Daily., Disp: 14 tablet, Rfl: 0  •  fluticasone (FLONASE) 50 MCG/ACT nasal spray, 2 sprays into the nostril(s) as directed by provider Daily., Disp: 1 bottle, Rfl: 11  •  fluticasone-salmeterol (Advair Diskus) 250-50 MCG/DOSE DISKUS, Inhale 1 puff 2 (Two) Times a Day., Disp: 180 each, Rfl: 3  •  glucose blood (ACCU-CHEK COMPACT TEST DRUM) test strip, Uses X 3 /day, Disp: 102 each, Rfl: 12  •  indapamide (LOZOL) 1.25 MG tablet, TAKE ONE TABLET BY MOUTH DAILY ....  NEEDS LAB WORK, Disp: 90 tablet, Rfl: 0  •  insulin aspart protamine & aspart (NOVOLOG) 70/30 100 unit/mL, INJECT 42 UNITS TWO TIMES A DAY BEFORE MEALS--MUST BE SEEN IN OFFICE FOR REFILLS, Disp: 30 pen, Rfl: 1  •  Insulin Pen Needle (BD Pen Needle Melody U/F) 32G X 4 MM misc, Inject 1 each under the skin into the appropriate area as directed 2 (two) times a day., Disp: 200 each, Rfl: 3  •  lisinopril (PRINIVIL,ZESTRIL) 40 MG tablet, TAKE ONE TABLET BY MOUTH DAILY, Disp: 90 tablet, Rfl: 2  •  Loratadine (Claritin) 10 MG capsule, Take 1 capsule by mouth Daily., Disp: 90 each, Rfl: 3  •  montelukast (SINGULAIR) 10 MG tablet, Take 1 tablet by mouth Every Night., Disp: 90 tablet, Rfl: 3  •  nitroglycerin (NITROSTAT) 0.4 MG SL tablet, Place 1 tablet under the tongue Every 5 (Five) Minutes As Needed for Chest Pain., Disp: 25 tablet, Rfl: 11  •  omeprazole (priLOSEC) 40 MG capsule, Take 1 capsule by mouth Daily., Disp: 90 capsule, Rfl: 3  •  ONETOUCH DELICA LANCETS 33G misc, TEST BLOOD SUGAR ONCE DAILY, Disp: 100 each, Rfl: 4  •  terazosin (HYTRIN) 10 MG capsule, TAKE ONE CAPSULE BY MOUTH EVERY NIGHT AT BEDTIME, Disp: 90 capsule, Rfl: 3  •  Ventolin  (90 Base) MCG/ACT inhaler, Inhale 2 puffs Every 4 (Four) Hours As Needed for Wheezing., Disp: 8 g, Rfl: 5  MEDICATION LIST AND ALLERGIES REVIEWED.    Social History     Tobacco Use   • Smoking status: Former Smoker     Types: Cigarettes     Quit date: 2016     Years since quittin.5   • Smokeless tobacco: Never Used   Substance Use Topics   • Alcohol use: No   • Drug use: No       FAMILY AND SOCIAL HISTORY REVIEWED.    Review of Systems   Constitutional: Negative for activity change, appetite change, fatigue, fever and unexpected weight change.   HENT: Positive for postnasal drip and rhinorrhea. Negative for congestion, sinus pressure, sore throat and voice change.    Eyes: Negative for visual disturbance.   Respiratory: Positive for cough. Negative for chest  "tightness, shortness of breath and wheezing.    Cardiovascular: Negative for chest pain, palpitations and leg swelling.   Gastrointestinal: Negative for abdominal distention, abdominal pain, nausea and vomiting.   Endocrine: Negative for cold intolerance and heat intolerance.   Genitourinary: Negative for difficulty urinating and urgency.   Musculoskeletal: Negative for arthralgias, back pain and neck pain.   Skin: Negative for color change and pallor.   Allergic/Immunologic: Negative for environmental allergies and food allergies.   Neurological: Negative for dizziness, syncope, weakness and light-headedness.   Hematological: Negative for adenopathy. Does not bruise/bleed easily.   Psychiatric/Behavioral: Negative for agitation and behavioral problems.   .    /60   Pulse 75   Temp 97.8 °F (36.6 °C)   Ht 170.2 cm (67\")   Wt 86.5 kg (190 lb 9.6 oz)   SpO2 98% Comment: resting at room air  BMI 29.85 kg/m²     Immunization History   Administered Date(s) Administered   • Fluzone High Dose =>65 Years ([employer] ONLY) 11/06/2017, 10/29/2018   • Influenza, Unspecified 10/03/2019, 10/01/2020   • Pneumococcal Conjugate 13-Valent (PCV13) 11/06/2017   • Td 03/19/1997   • influenza Split 12/07/2016       Physical Exam  Vitals signs and nursing note reviewed.   Constitutional:       Appearance: He is well-developed. He is not diaphoretic.   HENT:      Head: Normocephalic and atraumatic.   Eyes:      Pupils: Pupils are equal, round, and reactive to light.   Neck:      Musculoskeletal: Normal range of motion and neck supple.      Thyroid: No thyromegaly.   Cardiovascular:      Rate and Rhythm: Normal rate and regular rhythm.      Heart sounds: Normal heart sounds. No murmur. No friction rub. No gallop.    Pulmonary:      Effort: Pulmonary effort is normal. No respiratory distress.      Breath sounds: Normal breath sounds. No wheezing or rales.   Chest:      Chest wall: No tenderness.   Abdominal:      General: Bowel " sounds are normal.      Palpations: Abdomen is soft.      Tenderness: There is no abdominal tenderness.   Musculoskeletal: Normal range of motion.         General: No swelling.   Lymphadenopathy:      Cervical: No cervical adenopathy.   Skin:     General: Skin is warm and dry.      Capillary Refill: Capillary refill takes less than 2 seconds.   Neurological:      Mental Status: He is alert and oriented to person, place, and time.   Psychiatric:         Mood and Affect: Mood normal.         Behavior: Behavior normal.           RESULTS      PROBLEM LIST    Rashad was seen today for shortness of breath and asthma.  Diagnoses and all orders for this visit:  Seasonal allergic rhinitis due to pollen  -     azelastine (ASTELIN) 0.1 % nasal spray  -     Loratadine (Claritin) 10 MG capsule  -     montelukast (SINGULAIR) 10 MG tablet  Mild intermittent asthma without complication  -     fluticasone-salmeterol (Advair Diskus) 250-50 MCG/DOSE DISKUS  -     montelukast (SINGULAIR) 10 MG tablet  -     Ventolin  (90 Base) MCG/ACT inhaler  Gastroesophageal reflux disease  -     omeprazole (priLOSEC) 40 MG capsule        DISCUSSION    Mr. Mendez is here for follow-up of his asthma and EVA.  He seems to be doing fairly well from a pulmonary standpoint.  I do suspect that his recurrent infections are from a sinus standpoint.  I am to start him on Astelin nasal spray in addition to his Claritin, Singulair and Flonase.  I did encourage him to do these daily for his allergic rhinitis.    He will remain on Advair twice a day for his asthma.  I did encourage him to use his albuterol as needed for shortness of breath.    He will continue to wear CPAP every night for EVA.  I would order him a new CPAP machine and I did encourage him to take his old machine to his [employer] and get a download to send to me to put in his records.    He will continue omeprazole daily for GERD.  We also discussed reflux precautions in the office  today.    He will follow-up in 6 months with full PFTs and a chest x-ray or sooner if his symptoms worsen.  He will call with any additional concerns or questions.  I spent 20-29 minutes with the patient and family. I spent > 50% percent of this time counseling and discussing diagnosis, prognosis, diagnostic testing, evaluation, current status, treatment options and management.    Gilda Ma, LOUISA  12/03/202011:25 EST  Electronically signed     Please note that portions of this note were completed with a voice recognition program. Efforts were made to edit the dictations, but occasionally words are mistranscribed.      CC: Sampson Torres MD

## 2021-01-18 DIAGNOSIS — Z79.4 TYPE 2 DIABETES MELLITUS WITH OTHER SPECIFIED COMPLICATION, WITH LONG-TERM CURRENT USE OF INSULIN (HCC): ICD-10-CM

## 2021-01-18 DIAGNOSIS — E11.69 TYPE 2 DIABETES MELLITUS WITH OTHER SPECIFIED COMPLICATION, WITH LONG-TERM CURRENT USE OF INSULIN (HCC): ICD-10-CM

## 2021-01-18 RX ORDER — INSULIN ASPART 100 [IU]/ML
INJECTION, SUSPENSION SUBCUTANEOUS
Qty: 66 ML | Refills: 1 | Status: SHIPPED | OUTPATIENT
Start: 2021-01-18 | End: 2021-03-31

## 2021-01-28 RX ORDER — LISINOPRIL 40 MG/1
TABLET ORAL
Qty: 90 TABLET | Refills: 1 | Status: SHIPPED | OUTPATIENT
Start: 2021-01-28 | End: 2021-07-16

## 2021-02-15 RX ORDER — INDAPAMIDE 1.25 MG/1
TABLET, FILM COATED ORAL
Qty: 90 TABLET | Refills: 0 | OUTPATIENT
Start: 2021-02-15

## 2021-02-15 RX ORDER — INDAPAMIDE 1.25 MG/1
TABLET, FILM COATED ORAL
Qty: 90 TABLET | Refills: 0 | Status: SHIPPED | OUTPATIENT
Start: 2021-02-15 | End: 2021-05-18

## 2021-03-31 ENCOUNTER — OFFICE VISIT (OUTPATIENT)
Dept: ENDOCRINOLOGY | Facility: CLINIC | Age: 76
End: 2021-03-31

## 2021-03-31 VITALS
OXYGEN SATURATION: 99 % | HEIGHT: 67 IN | WEIGHT: 192 LBS | HEART RATE: 62 BPM | SYSTOLIC BLOOD PRESSURE: 138 MMHG | BODY MASS INDEX: 30.13 KG/M2 | DIASTOLIC BLOOD PRESSURE: 60 MMHG | TEMPERATURE: 97.5 F

## 2021-03-31 DIAGNOSIS — I25.10 CORONARY ARTERY DISEASE INVOLVING NATIVE CORONARY ARTERY OF NATIVE HEART WITHOUT ANGINA PECTORIS: ICD-10-CM

## 2021-03-31 DIAGNOSIS — E78.2 MIXED HYPERLIPIDEMIA: ICD-10-CM

## 2021-03-31 DIAGNOSIS — E11.65 UNCONTROLLED TYPE 2 DIABETES MELLITUS WITH HYPERGLYCEMIA (HCC): Primary | ICD-10-CM

## 2021-03-31 PROBLEM — N18.31 STAGE 3A CHRONIC KIDNEY DISEASE (HCC): Status: ACTIVE | Noted: 2021-03-31

## 2021-03-31 LAB
EXPIRATION DATE: NORMAL
HBA1C MFR BLD: 7.5 %
Lab: NORMAL

## 2021-03-31 PROCEDURE — 83036 HEMOGLOBIN GLYCOSYLATED A1C: CPT | Performed by: INTERNAL MEDICINE

## 2021-03-31 PROCEDURE — 99214 OFFICE O/P EST MOD 30 MIN: CPT | Performed by: INTERNAL MEDICINE

## 2021-03-31 RX ORDER — DULAGLUTIDE 0.75 MG/.5ML
0.75 INJECTION, SOLUTION SUBCUTANEOUS
Qty: 2 ML | Refills: 5 | Status: SHIPPED | OUTPATIENT
Start: 2021-03-31 | End: 2021-05-05

## 2021-03-31 RX ORDER — LANCETS 33 GAUGE
1 EACH MISCELLANEOUS 2 TIMES DAILY
Qty: 200 EACH | Refills: 3 | Status: SHIPPED | OUTPATIENT
Start: 2021-03-31 | End: 2021-04-14

## 2021-03-31 RX ORDER — INSULIN ASPART 100 [IU]/ML
INJECTION, SUSPENSION SUBCUTANEOUS
Qty: 75 ML | Refills: 1 | Status: SHIPPED | OUTPATIENT
Start: 2021-03-31 | End: 2021-10-21

## 2021-03-31 RX ORDER — PEN NEEDLE, DIABETIC 32GX 5/32"
1 NEEDLE, DISPOSABLE MISCELLANEOUS 2 TIMES DAILY
Qty: 200 EACH | Refills: 3 | Status: SHIPPED | OUTPATIENT
Start: 2021-03-31 | End: 2021-10-21 | Stop reason: SDUPTHER

## 2021-03-31 RX ORDER — PREDNISOLONE ACETATE 10 MG/ML
SUSPENSION/ DROPS OPHTHALMIC
COMMUNITY
Start: 2021-01-26

## 2021-03-31 NOTE — PROGRESS NOTES
Chief Complaint   Patient presents with   • Diabetes          HPI   Rashad Mendez is a 76 y.o. male had concerns including Diabetes.    He is checking blood sugars 1 times per day. BGs range 80s-190s in the morning.  Current medications for diabetes include mixed insulin 42 units twice daily. Often takes the insulin after meals - sometimes hours after.     A1c up to 7.5 from 6.6.     No personal history of pancreatitis and no family history of any other MTC.  Patient has not been on a GLP-1 agonist in the past.    Is on atorvastatin for cholesterol.    The following portions of the patient's history were reviewed and updated as appropriate: allergies, current medications, past family history, past medical history, past social history, past surgical history and problem list.      Review of Systems   Gastrointestinal: Positive for abdominal pain and diarrhea.   Endocrine:        See HPI        Physical Exam  Vitals reviewed.   Constitutional:       Appearance: Normal appearance. He is obese.   HENT:      Head: Normocephalic and atraumatic.   Cardiovascular:      Rate and Rhythm: Normal rate.      Pulses:           Dorsalis pedis pulses are 1+ on the right side and 1+ on the left side.   Pulmonary:      Effort: Pulmonary effort is normal.   Musculoskeletal:      Right foot: Deformity (hammer toe) present.      Left foot: Deformity (hammer toe) present.   Feet:      Right foot:      Skin integrity: Callus and dry skin present.      Toenail Condition: Right toenails are abnormally thick.      Left foot:      Skin integrity: Callus and dry skin present.      Toenail Condition: Left toenails are abnormally thick.      Comments: Monofilament 5 out of 5 bilaterally  Neurological:      Mental Status: He is alert.   Psychiatric:         Mood and Affect: Mood normal.         Behavior: Behavior normal.        /60 (BP Location: Left arm, Patient Position: Sitting, Cuff Size: Adult)   Pulse 62   Temp 97.5 °F (36.4 °C)  "(Infrared)   Ht 170.2 cm (67\")   Wt 87.1 kg (192 lb)   SpO2 99%   BMI 30.07 kg/m²      Diabetic Foot Exam Performed and Monofilament Test Performed      Labs and imaging    CMP:  Lab Results   Component Value Date     (H) 12/07/2016    BUN 18 12/07/2016    CREATININE 1.20 12/07/2016    EGFRIFNONA 60 (L) 12/07/2016    BCR 15.0 12/07/2016     12/07/2016    K 4.3 12/07/2016    CO2 34.0 (H) 12/07/2016    CALCIUM 9.6 12/07/2016    PROTENTOTREF 7.2 01/10/2018    ALBUMIN 4.20 01/10/2018    BILITOT 0.8 01/10/2018    ALKPHOS 96 01/10/2018    AST 25 01/10/2018    ALT 31 01/10/2018     Lipid Panel:  Lab Results   Component Value Date    TRIG 234 (H) 01/10/2018    HDL 34 (L) 01/10/2018    VLDL 46.8 01/10/2018    LDL 61 01/10/2018     HbA1c:  Lab Results   Component Value Date    HGBA1C 7.5 03/31/2021    HGBA1C 6.6 08/26/2020     Glucose:    Lab Results   Component Value Date    POCGLU 155 (A) 08/26/2020     Microalbumin:  Lab Results   Component Value Date    MALBCRERATIO 100 (H) 08/26/2020     TSH:  Lab Results   Component Value Date    TSH 2.004 12/07/2016 6/25/2020 creatinine 1.44, GFR 47    Assessment and plan  Diagnoses and all orders for this visit:    1. Uncontrolled type 2 diabetes mellitus with hyperglycemia (CMS/[facility]) (Primary)  Uncontrolled with A1c up to 7.5 from 6.6 at his last visit.  Complicated by hyperglycemia, microalbuminuria, CKD 3, retinopathy.  No metformin due to history of GI intolerance.  Continue mixed insulin 42 units twice daily. Suspect hyperglycemia is due to timing issue - taking insulin after meals - sometimes hours after. Recommended to take 15 minutes prior to the start of the meal.  Add Trulicity 0.75 mg weekly due to diabetes and history of cardiovascular disease.  No personal history of pancreatitis and no family history of MEN or MTC.  Cautioned regarding possible GI side effects.  Check fasting labs today.  Ophtho exam up-to-date with cataract surgery scheduled next " week.  Monofilament updated today.  -     POC Glycosylated Hemoglobin (Hb A1C)  -     POC Glycosylated Hemoglobin (Hb A1C)  -     Comprehensive Metabolic Panel  -     Lipid Panel  -     TSH  -     Dulaglutide (Trulicity) 0.75 MG/0.5ML solution pen-injector; Inject 0.75 mg under the skin into the appropriate area as directed Every 7 (Seven) Days.  Dispense: 2 mL; Refill: 5  -     insulin aspart prot & aspart (NovoLOG Mix 70/30 FlexPen) (70-30) 100 UNIT/ML suspension pen-injector injection; INJECT 42 UNITS BEFORE MEALS TWO TIMES A DAY  Dispense: 75 mL; Refill: 1  -     Insulin Pen Needle (BD Pen Needle Melody U/F) 32G X 4 MM misc; Inject 1 each under the skin into the appropriate area as directed 2 (two) times a day.  Dispense: 200 each; Refill: 3  -     OneTouch Delica Lancets 33G misc; 1 each by Other route 2 (two) times a day.  Dispense: 200 each; Refill: 3    2. Mixed hyperlipidemia  History of hyperlipidemia on atorvastatin 40 mg daily.  Due for fasting lipid and CMP.    3. Coronary artery disease involving native coronary artery of native heart without angina pectoris  Due to history of CAD, Trulicity as indicated.  Checking CMP and lipid today as well.  Patient follows with Dr. Sexton.       Return in about 3 months (around 6/30/2021) for next scheduled follow up. The patient was instructed to contact the clinic with any interval questions or concerns.    Dorita Mitchell,    Endocrinologist    Please note that portions of this document were completed with a voice recognition program. Efforts were made to edit the dictations, but occasionally words are mis-transcribed.

## 2021-04-01 LAB
ALBUMIN SERPL-MCNC: 4.2 G/DL (ref 3.5–5.2)
ALBUMIN/GLOB SERPL: 1.4 G/DL
ALP SERPL-CCNC: 87 U/L (ref 39–117)
ALT SERPL-CCNC: 27 U/L (ref 1–41)
AST SERPL-CCNC: 23 U/L (ref 1–40)
BILIRUB SERPL-MCNC: 0.6 MG/DL (ref 0–1.2)
BUN SERPL-MCNC: 19 MG/DL (ref 8–23)
BUN/CREAT SERPL: 14.7 (ref 7–25)
CALCIUM SERPL-MCNC: 9 MG/DL (ref 8.6–10.5)
CHLORIDE SERPL-SCNC: 106 MMOL/L (ref 98–107)
CHOLEST SERPL-MCNC: 115 MG/DL (ref 0–200)
CO2 SERPL-SCNC: 24.3 MMOL/L (ref 22–29)
CREAT SERPL-MCNC: 1.29 MG/DL (ref 0.76–1.27)
GLOBULIN SER CALC-MCNC: 2.9 GM/DL
GLUCOSE SERPL-MCNC: 107 MG/DL (ref 65–99)
HDLC SERPL-MCNC: 31 MG/DL (ref 40–60)
LDLC SERPL CALC-MCNC: 44 MG/DL (ref 0–100)
POTASSIUM SERPL-SCNC: 4.5 MMOL/L (ref 3.5–5.2)
PROT SERPL-MCNC: 7.1 G/DL (ref 6–8.5)
SODIUM SERPL-SCNC: 140 MMOL/L (ref 136–145)
TRIGL SERPL-MCNC: 257 MG/DL (ref 0–150)
TSH SERPL DL<=0.005 MIU/L-ACNC: 2.89 UIU/ML (ref 0.27–4.2)
VLDLC SERPL CALC-MCNC: 40 MG/DL (ref 5–40)

## 2021-04-05 RX ORDER — ATORVASTATIN CALCIUM 40 MG/1
TABLET, FILM COATED ORAL
Qty: 90 TABLET | Refills: 2 | Status: SHIPPED | OUTPATIENT
Start: 2021-04-05 | End: 2021-12-30

## 2021-04-13 DIAGNOSIS — E11.65 UNCONTROLLED TYPE 2 DIABETES MELLITUS WITH HYPERGLYCEMIA (HCC): ICD-10-CM

## 2021-04-16 RX ORDER — LANCETS
1 EACH MISCELLANEOUS 2 TIMES DAILY
Qty: 60 EACH | Refills: 5 | Status: SHIPPED | OUTPATIENT
Start: 2021-04-16

## 2021-04-16 NOTE — TELEPHONE ENCOUNTER
PT'S WIFE CALLED REQUESTING A REFILL OF ACCU CHECK SOFT CLICKS LANCETS TO BE SENT IN TO [facility] IN [place], [place]. THANK YOU

## 2021-04-20 RX ORDER — LANCETS
EACH MISCELLANEOUS
Qty: 300 EACH | Refills: 2 | Status: SHIPPED | OUTPATIENT
Start: 2021-04-20 | End: 2023-04-05 | Stop reason: SDUPTHER

## 2021-05-05 ENCOUNTER — OFFICE VISIT (OUTPATIENT)
Dept: CARDIOLOGY | Facility: CLINIC | Age: 76
End: 2021-05-05

## 2021-05-05 VITALS
HEART RATE: 82 BPM | HEIGHT: 67 IN | BODY MASS INDEX: 30.32 KG/M2 | OXYGEN SATURATION: 96 % | WEIGHT: 193.2 LBS | DIASTOLIC BLOOD PRESSURE: 62 MMHG | SYSTOLIC BLOOD PRESSURE: 156 MMHG

## 2021-05-05 DIAGNOSIS — I10 ESSENTIAL HYPERTENSION: ICD-10-CM

## 2021-05-05 DIAGNOSIS — I25.10 CORONARY ARTERY DISEASE INVOLVING NATIVE CORONARY ARTERY OF NATIVE HEART WITHOUT ANGINA PECTORIS: Primary | ICD-10-CM

## 2021-05-05 DIAGNOSIS — E78.5 DYSLIPIDEMIA: ICD-10-CM

## 2021-05-05 PROCEDURE — 99214 OFFICE O/P EST MOD 30 MIN: CPT | Performed by: INTERNAL MEDICINE

## 2021-05-05 NOTE — PROGRESS NOTES
[facility] Cardiology    Patient ID: Rashad Mendez is a 76 y.o. male.  : 1945   Contact: [phone]    Encounter date: 2021    PCP: Sampson Torres MD      Chief complaint:   Chief Complaint   Patient presents with   • Coronary artery disease involving native coronary artery of        Problem List:  1. Coronary artery disease:  a. History of extreme fatigue, 2008.  b. [facility] [place], 2008: S/p Liberté 3 x 12 mm stent to the OM1. Normal EF.  c. Cardiolite, 10/29/2012: Positive stress Cardiolite for inducible ischemia in the inferior myocardial segment.  d. LHC, 2012: Normal EF. Occluded RCA with left-to-right collaterals. 70-80% ostial LM stenosis. Normal EF.  e. CABG, 2012, Dr. Byers: SVG to LCx, SVG to PDA, LIMA to LAD.  f. MPS, 2018: No evidence of inducible ischemia.  2. Cerebrovascular disease  a. History of transient ischemic attack in  (right facial drooping and upper extremity weakness).  b. Acute lacunar infarct, 2014, felt secondary to accelerated hypertension:  c. 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  d. Subsequent admission, 2014, for acute on subacute ischemic stroke (right thalamic) with the addition of Plavix.  3. Hypertension:  a. Bradycardia, on beta-blockers. Coreg discontinued, 2014.  4. Hyperlipidemia.  5. Type 2 diabetes mellitus.  6. Gastroesophageal reflux disease.  7. Diverticulitis.  8. Irritable bowel syndrome.  9. Obstructive sleep apnea with CPAP usage at bedtime.  10. Asthma.  11. History of tobacco abuse with cessation 20 years ago.  12. Obesity.  13. Surgical history:  a. Status post cholecystectomy.  b. Status post right rotator cuff repair.    Allergies   Allergen Reactions   • Latex Rash   • Levofloxacin Anaphylaxis   • Sulfa Antibiotics Anaphylaxis   • Beta Adrenergic Blockers Other (See Comments)     Significant bradycardia   • Diovan [Valsartan] Diarrhea   •  Entex Lq [Phenylephrine-Guaifenesin] Hives       Current Medications:    Current Outpatient Medications:   •  Accu-Chek Softclix Lancets lancets, Test Three Times daily, Disp: 300 each, Rfl: 2  •  Accu-Chek Softclix Lancets lancets, 1 each by Other route 2 (two) times a day. Dx E11.65, Disp: 60 each, Rfl: 5  •  amLODIPine (NORVASC) 10 MG tablet, TAKE ONE TABLET BY MOUTH DAILY, Disp: 90 tablet, Rfl: 3  •  atorvastatin (LIPITOR) 40 MG tablet, TAKE ONE TABLET BY MOUTH AT BEDTIME,NEED APPOINTMENT FOR MORE REFILLS, Disp: 90 tablet, Rfl: 2  •  Blood Glucose Monitoring Suppl (ACCU-CHEK COMPACT CARE KIT) kit, Pt. Is visually impaired - qualifies for this meter, Disp: 1 each, Rfl: 0  •  clopidogrel (PLAVIX) 75 MG tablet, TAKE ONE TABLET BY MOUTH DAILY, Disp: 90 tablet, Rfl: 3  •  colestipol (MICRONIZED COLESTIPOL HCL) 1 g tablet, Take 2 tablets by mouth two times daily, separate 2 hours from other medications., Disp: 120 tablet, Rfl: 11  •  fluticasone (FLONASE) 50 MCG/ACT nasal spray, 2 sprays into the nostril(s) as directed by provider Daily., Disp: 1 bottle, Rfl: 11  •  fluticasone-salmeterol (Advair Diskus) 250-50 MCG/DOSE DISKUS, Inhale 1 puff 2 (Two) Times a Day., Disp: 180 each, Rfl: 3  •  glucose blood (ACCU-CHEK COMPACT TEST DRUM) test strip, Uses X 3 /day, Disp: 102 each, Rfl: 12  •  indapamide (LOZOL) 1.25 MG tablet, TAKE ONE TABLET BY MOUTH DAILY -- NEEDS LAB WORK, Disp: 90 tablet, Rfl: 0  •  insulin aspart prot & aspart (NovoLOG Mix 70/30 FlexPen) (70-30) 100 UNIT/ML suspension pen-injector injection, INJECT 42 UNITS BEFORE MEALS TWO TIMES A DAY, Disp: 75 mL, Rfl: 1  •  Insulin Pen Needle (BD Pen Needle Melody U/F) 32G X 4 MM misc, Inject 1 each under the skin into the appropriate area as directed 2 (two) times a day., Disp: 200 each, Rfl: 3  •  lisinopril (PRINIVIL,ZESTRIL) 40 MG tablet, TAKE ONE TABLET BY MOUTH DAILY, Disp: 90 tablet, Rfl: 1  •  Loratadine (Claritin) 10 MG capsule, Take 1 capsule by mouth  "Daily., Disp: 90 each, Rfl: 3  •  montelukast (SINGULAIR) 10 MG tablet, Take 1 tablet by mouth Every Night., Disp: 90 tablet, Rfl: 3  •  nitroglycerin (NITROSTAT) 0.4 MG SL tablet, Place 1 tablet under the tongue Every 5 (Five) Minutes As Needed for Chest Pain., Disp: 25 tablet, Rfl: 11  •  omeprazole (priLOSEC) 40 MG capsule, Take 1 capsule by mouth Daily., Disp: 90 capsule, Rfl: 3  •  prednisoLONE acetate (PRED FORTE) 1 % ophthalmic suspension, , Disp: , Rfl:   •  terazosin (HYTRIN) 10 MG capsule, TAKE ONE CAPSULE BY MOUTH EVERY NIGHT AT BEDTIME, Disp: 90 capsule, Rfl: 3  •  Ventolin  (90 Base) MCG/ACT inhaler, Inhale 2 puffs Every 4 (Four) Hours As Needed for Wheezing., Disp: 8 g, Rfl: 5    HPI    Rashad Mendez is a 76 y.o. male who presents today for an annual follow up of coronary artery disease and cardiac risk factors. Since last visit, he has been feeling well overall from a cardiovascular standpoint. He monitors his blood pressure routinely and his systolic pressure usually runs in the 120's to 130's. It occasionally spikes up higher. He experiences some shortness of breath and chest pressure but he notes that he has a history of asthma. He did not have any chest pain prior to his CABG. He was advised to start fish oils due to high triglycerides. He stays busy by mowing the grass with his automatic  and working in the garden. He does not exercise routinely. Patient otherwise denies chest pain, PND, edema, palpitations, syncope, or presyncope at this time.    The following portions of the patient's history were reviewed and updated as appropriate: allergies, current medications and problem list.    Pertinent positives as listed in the HPI.  All other systems reviewed are negative.         Vitals:    05/05/21 1015   BP: 156/62   BP Location: Left arm   Patient Position: Sitting   Pulse: 82   SpO2: 96%   Weight: 87.6 kg (193 lb 3.2 oz)   Height: 170.2 cm (67\")       Physical Exam:  General: " Alert and oriented.  Neck: Jugular venous pressure is within normal limits. Carotids have normal upstrokes without bruits.   Cardiovascular: Heart has a nondisplaced focal PMI. Regular rate and rhythm. No murmur, gallop or rub.  Lungs: Clear, no rales or wheezes. Equal expansion is noted.   Extremities: Show no edema.  Skin: Warm and dry.  Neurologic: Nonfocal.     Diagnostic Data (reviewed with patient):  Lab date: 06/26/2020  • CBC: WBC 11, RBC 4.18, HGB 13.1, HCT 38.8, MCV 93, MCH 31.3,       Lab Results   Component Value Date     (H) 03/31/2021    BUN 19 03/31/2021    CREATININE 1.29 (H) 03/31/2021    EGFRIFNONA 54 (L) 03/31/2021    EGFRIFAFRI 66 03/31/2021    BCR 14.7 03/31/2021     03/31/2021    K 4.5 03/31/2021     03/31/2021    CO2 24.3 03/31/2021    CALCIUM 9.0 03/31/2021    ALBUMIN 4.20 03/31/2021    ALKPHOS 87 03/31/2021    AST 23 03/31/2021    ALT 27 03/31/2021     Lab Results   Component Value Date    CHLPL 115 03/31/2021    TRIG 257 (H) 03/31/2021    HDL 31 (L) 03/31/2021    LDL 44 03/31/2021       Lab Results   Component Value Date    TSH 2.890 03/31/2021        Procedures    Advance Care Planning   ACP discussion was held with the patient during this visit. Patient has an advance directive (not in EMR), copy requested.      Assessment:    ICD-10-CM ICD-9-CM   1. Coronary artery disease involving native coronary artery of native heart without angina pectoris  I25.10 414.01   2. Essential hypertension  I10 401.9   3. Dyslipidemia  E78.5 272.4         Plan:  1. Stress test ordered due to no symptoms prior to previous stents.  2. Begin routine aerobic exercise to at least 30 minutes 5 days per week.   3. Continue Plavix 75 mg daily for CAD.  4. Continue amlodipine 10 mg daily, terazosin 10 mg nightly, and lisinopril 40 mg daily for hypertension.  5. Continue atorvastatin 40 mg nightly and colestipol 1 g BID for hyperlipidemia.   6. Continue all other current  medications.  7. F/up in 12 months, sooner if needed.    Scribed for Ofelia Sexton MD by Maggie Mcrbide. 5/5/2021  10:40 EDT      I Ofelia Sexton MD personally performed the services described in this documentation as scribed by the above individual in my presence, and it is both accurate and complete.    Ofelia Sexton MD, FACC

## 2021-05-18 RX ORDER — TERAZOSIN 10 MG/1
CAPSULE ORAL
Qty: 90 CAPSULE | Refills: 3 | Status: SHIPPED | OUTPATIENT
Start: 2021-05-18 | End: 2022-05-13

## 2021-05-18 RX ORDER — INDAPAMIDE 1.25 MG/1
1.25 TABLET, FILM COATED ORAL DAILY
Qty: 90 TABLET | Refills: 1 | Status: SHIPPED | OUTPATIENT
Start: 2021-05-18 | End: 2021-11-17

## 2021-06-03 RX ORDER — CLOPIDOGREL BISULFATE 75 MG/1
TABLET ORAL
Qty: 90 TABLET | Refills: 3 | Status: SHIPPED | OUTPATIENT
Start: 2021-06-03 | End: 2022-06-06

## 2021-06-04 ENCOUNTER — OFFICE VISIT (OUTPATIENT)
Dept: PULMONOLOGY | Facility: CLINIC | Age: 76
End: 2021-06-04

## 2021-06-04 VITALS
OXYGEN SATURATION: 98 % | HEART RATE: 62 BPM | SYSTOLIC BLOOD PRESSURE: 152 MMHG | BODY MASS INDEX: 29.66 KG/M2 | TEMPERATURE: 98.7 F | DIASTOLIC BLOOD PRESSURE: 68 MMHG | WEIGHT: 189 LBS | HEIGHT: 67 IN

## 2021-06-04 DIAGNOSIS — J45.20 MILD INTERMITTENT ASTHMA WITHOUT COMPLICATION: ICD-10-CM

## 2021-06-04 DIAGNOSIS — K21.9 GASTROESOPHAGEAL REFLUX DISEASE, UNSPECIFIED WHETHER ESOPHAGITIS PRESENT: ICD-10-CM

## 2021-06-04 DIAGNOSIS — G47.33 OBSTRUCTIVE SLEEP APNEA SYNDROME: ICD-10-CM

## 2021-06-04 DIAGNOSIS — J45.909 ASTHMA, UNSPECIFIED ASTHMA SEVERITY, UNSPECIFIED WHETHER COMPLICATED, UNSPECIFIED WHETHER PERSISTENT: Primary | ICD-10-CM

## 2021-06-04 PROCEDURE — 94375 RESPIRATORY FLOW VOLUME LOOP: CPT | Performed by: NURSE PRACTITIONER

## 2021-06-04 PROCEDURE — 94729 DIFFUSING CAPACITY: CPT | Performed by: NURSE PRACTITIONER

## 2021-06-04 PROCEDURE — 94726 PLETHYSMOGRAPHY LUNG VOLUMES: CPT | Performed by: NURSE PRACTITIONER

## 2021-06-04 PROCEDURE — 99214 OFFICE O/P EST MOD 30 MIN: CPT | Performed by: NURSE PRACTITIONER

## 2021-06-04 RX ORDER — ALBUTEROL SULFATE 90 UG/1
2 AEROSOL, METERED RESPIRATORY (INHALATION) EVERY 4 HOURS PRN
Qty: 8 G | Refills: 5 | Status: SHIPPED | OUTPATIENT
Start: 2021-06-04 | End: 2021-12-09 | Stop reason: SDUPTHER

## 2021-06-04 NOTE — PROGRESS NOTES
[facility] Pulmonary Follow up    CHIEF COMPLAINT    Asthma/EVA    HISTORY OF PRESENT ILLNESS    Rashad Mendez is a 76 y.o.male here today for follow-up.  He was last seen in the office by me in December.  He denies any respiratory illnesses or exacerbations since his last appointment.    He continues use his Advair twice a day.  He will occasionally use an albuterol rescue inhaler as needed for shortness of breath.  Sometimes he uses it more than others.  He continues to have some shortness of breath with exertion but does recover fairly quickly at rest.    He continues to wear his CPAP every night for EVA.  He does average anywhere between 6 to 8 hours.  He denies any daytime somnolence or fatigue.  He feels well rested in the mornings.    He denies fever, chills, sputum production, hemoptysis, night sweats, weight loss, chest pain or palpitations.  He continues to have difficulty with chronic sinus drainage and postnasal drip.  He takes Flonase, Claritin and Singulair daily.  He denies reflux symptoms and takes omeprazole daily.    He is up-to-date on his current vaccinations.    He quit smoking in 2016.    Patient Active Problem List   Diagnosis   • Coronary artery disease   • Cerebrovascular disease   • Diabetic retinopathy (CMS/HCC)   • Gastroesophageal reflux disease   • Hypertension   • Adiposity   • Obstructive sleep apnea syndrome   • Peripheral vascular disease (CMS/HCC)   • Acute cerebral infarction (CMS/[facility])   • Allergic rhinitis   • Asthma   • Blurry vision   • Carpal tunnel syndrome   • Diverticulosis   • Dyslipidemia   • Edema   • Irritable bowel syndrome   • Long term current use of insulin (CMS/HCC)   • Oral thrush   • Paresthesia   • Restrictive lung disease   • Type 2 diabetes mellitus with hyperglycemia, with long-term current use of insulin (CMS/HCC)   • Diverticulitis   • Adenomatous polyp of sigmoid colon   • Stage 3a chronic kidney disease (CMS/HCC)       Allergies   Allergen Reactions   •  Latex Rash   • Levofloxacin Anaphylaxis   • Sulfa Antibiotics Anaphylaxis   • Beta Adrenergic Blockers Other (See Comments)     Significant bradycardia   • Diovan [Valsartan] Diarrhea   • Entex Lq [Phenylephrine-Guaifenesin] Hives       Current Outpatient Medications:   •  Accu-Chek Softclix Lancets lancets, Test Three Times daily, Disp: 300 each, Rfl: 2  •  Accu-Chek Softclix Lancets lancets, 1 each by Other route 2 (two) times a day. Dx E11.65, Disp: 60 each, Rfl: 5  •  amLODIPine (NORVASC) 10 MG tablet, TAKE ONE TABLET BY MOUTH DAILY, Disp: 90 tablet, Rfl: 3  •  atorvastatin (LIPITOR) 40 MG tablet, TAKE ONE TABLET BY MOUTH AT BEDTIME,NEED APPOINTMENT FOR MORE REFILLS, Disp: 90 tablet, Rfl: 2  •  Blood Glucose Monitoring Suppl (ACCU-CHEK COMPACT CARE KIT) kit, Pt. Is visually impaired - qualifies for this meter, Disp: 1 each, Rfl: 0  •  clopidogrel (PLAVIX) 75 MG tablet, TAKE ONE TABLET BY MOUTH DAILY, Disp: 90 tablet, Rfl: 3  •  colestipol (MICRONIZED COLESTIPOL HCL) 1 g tablet, Take 2 tablets by mouth two times daily, separate 2 hours from other medications., Disp: 120 tablet, Rfl: 11  •  fluticasone (FLONASE) 50 MCG/ACT nasal spray, 2 sprays into the nostril(s) as directed by provider Daily., Disp: 1 bottle, Rfl: 11  •  fluticasone-salmeterol (Advair Diskus) 250-50 MCG/DOSE DISKUS, Inhale 1 puff 2 (Two) Times a Day., Disp: 180 each, Rfl: 3  •  glucose blood (ACCU-CHEK COMPACT TEST DRUM) test strip, Uses X 3 /day, Disp: 102 each, Rfl: 12  •  indapamide (LOZOL) 1.25 MG tablet, Take 1 tablet by mouth Daily., Disp: 90 tablet, Rfl: 1  •  insulin aspart prot & aspart (NovoLOG Mix 70/30 FlexPen) (70-30) 100 UNIT/ML suspension pen-injector injection, INJECT 42 UNITS BEFORE MEALS TWO TIMES A DAY, Disp: 75 mL, Rfl: 1  •  Insulin Pen Needle (BD Pen Needle Melody U/F) 32G X 4 MM misc, Inject 1 each under the skin into the appropriate area as directed 2 (two) times a day., Disp: 200 each, Rfl: 3  •  lisinopril  (PRINIVIL,ZESTRIL) 40 MG tablet, TAKE ONE TABLET BY MOUTH DAILY, Disp: 90 tablet, Rfl: 1  •  Loratadine (Claritin) 10 MG capsule, Take 1 capsule by mouth Daily., Disp: 90 each, Rfl: 3  •  montelukast (SINGULAIR) 10 MG tablet, Take 1 tablet by mouth Every Night., Disp: 90 tablet, Rfl: 3  •  nitroglycerin (NITROSTAT) 0.4 MG SL tablet, Place 1 tablet under the tongue Every 5 (Five) Minutes As Needed for Chest Pain., Disp: 25 tablet, Rfl: 11  •  omeprazole (priLOSEC) 40 MG capsule, Take 1 capsule by mouth Daily., Disp: 90 capsule, Rfl: 3  •  prednisoLONE acetate (PRED FORTE) 1 % ophthalmic suspension, , Disp: , Rfl:   •  terazosin (HYTRIN) 10 MG capsule, TAKE ONE CAPSULE BY MOUTH EVERY NIGHT AT BEDTIME, Disp: 90 capsule, Rfl: 3  •  Ventolin  (90 Base) MCG/ACT inhaler, Inhale 2 puffs Every 4 (Four) Hours As Needed for Wheezing., Disp: 8 g, Rfl: 5  MEDICATION LIST AND ALLERGIES REVIEWED.    Social History     Tobacco Use   • Smoking status: Former Smoker     Types: Cigarettes     Quit date: 2016     Years since quittin.0   • Smokeless tobacco: Never Used   Vaping Use   • Vaping Use: Never used   Substance Use Topics   • Alcohol use: No   • Drug use: No       FAMILY AND SOCIAL HISTORY REVIEWED.    Review of Systems   Constitutional: Negative for activity change, appetite change, fatigue, fever and unexpected weight change.   HENT: Positive for postnasal drip and rhinorrhea. Negative for congestion, sinus pressure, sore throat and voice change.    Eyes: Negative for visual disturbance.   Respiratory: Positive for cough and shortness of breath. Negative for chest tightness and wheezing.    Cardiovascular: Negative for chest pain, palpitations and leg swelling.   Gastrointestinal: Negative for abdominal distention, abdominal pain, nausea and vomiting.   Endocrine: Negative for cold intolerance and heat intolerance.   Genitourinary: Negative for difficulty urinating and urgency.   Musculoskeletal: Negative for  "arthralgias, back pain and neck pain.   Skin: Negative for color change and pallor.   Allergic/Immunologic: Negative for environmental allergies and food allergies.   Neurological: Negative for dizziness, syncope, weakness and light-headedness.   Hematological: Negative for adenopathy. Does not bruise/bleed easily.   Psychiatric/Behavioral: Negative for agitation and behavioral problems.   .    /68 (BP Location: Left arm, Patient Position: Sitting, Cuff Size: Adult)   Pulse 62   Temp 98.7 °F (37.1 °C) (Temporal)   Ht 170.2 cm (67\")   Wt 85.7 kg (189 lb)   SpO2 98% Comment: room  air, resting  BMI 29.60 kg/m²     Immunization History   Administered Date(s) Administered   • COVID-19 (MODERNA) 02/23/2021, 03/25/2021   • Fluzone High Dose =>65 Years ([employer] ONLY) 11/06/2017, 10/29/2018   • Influenza, Unspecified 10/03/2019, 10/01/2020   • Pneumococcal Conjugate 13-Valent (PCV13) 11/06/2017   • Td 03/19/1997   • influenza Split 12/07/2016       Physical Exam  Vitals and nursing note reviewed.   Constitutional:       Appearance: He is well-developed. He is not diaphoretic.   HENT:      Head: Normocephalic and atraumatic.   Eyes:      Pupils: Pupils are equal, round, and reactive to light.   Neck:      Thyroid: No thyromegaly.   Cardiovascular:      Rate and Rhythm: Normal rate and regular rhythm.      Heart sounds: Normal heart sounds. No murmur heard.   No friction rub. No gallop.    Pulmonary:      Effort: Pulmonary effort is normal. No respiratory distress.      Breath sounds: Normal breath sounds. No wheezing or rales.   Chest:      Chest wall: No tenderness.   Abdominal:      General: Bowel sounds are normal.      Palpations: Abdomen is soft.      Tenderness: There is no abdominal tenderness.   Musculoskeletal:         General: No swelling. Normal range of motion.      Cervical back: Normal range of motion and neck supple.   Lymphadenopathy:      Cervical: No cervical adenopathy.   Skin:     General: " Skin is warm and dry.      Capillary Refill: Capillary refill takes less than 2 seconds.   Neurological:      Mental Status: He is alert and oriented to person, place, and time.   Psychiatric:         Mood and Affect: Mood normal.         Behavior: Behavior normal.           RESULTS    PFTS in the office today, read by me, FVC 2.38 70% predicted, FEV1 1.90 77% predicted, FEV1/FVC 80% predicted TLC 4.18 80% predicted, DLCO 68% predicted, no obstruction, no restriction and reduced DLCO.    CPAP download: Patient is 98.9% compliant, he averages 7 hours and 9 minutes, his average AHI is 2.4.    Chest PA/lateral: Official report pending    PROBLEM LIST    Problem List Items Addressed This Visit        Gastrointestinal Abdominal     Gastroesophageal reflux disease       Pulmonary and Pneumonias    Asthma - Primary    Relevant Medications    fluticasone-salmeterol (Advair Diskus) 250-50 MCG/DOSE DISKUS    Ventolin  (90 Base) MCG/ACT inhaler    Other Relevant Orders    XR Chest PA & Lateral    Pulmonary Function Test (Completed)       Sleep    Obstructive sleep apnea syndrome    Overview     Description: A.  On CPAP therapy daily at bedtime.                 DISCUSSION    Mr. Mendez was here for follow-up of his asthma and EVA.  He seems to be doing well from a pulmonary standpoint.  He will continue his Advair twice a day.  I did encourage him to use his albuterol as needed for shortness of breath.    We did review his chest x-ray in the office today and he appears to have no acute pulmonary process however the official report is pending.    We did review his PFTs in the office today and he does have no obstruction or restriction.    He will continue to take omeprazole daily for GERD.  We also discussed reflux precautions in the office today.    He will follow-up in 6 months or sooner if his symptoms worsen.  Advised him to call with any additional concerns or questions.    Level of service justified based on 35  minutes spent in patient care on this date of service including, but not limited to: preparing to see the patient, obtaining and/or reviewing history, performing medically appropriate examination, ordering tests/medicine/procedures, independently interpreting results, documenting clinical information in EHR, and counseling/education of patient/family/caregiver. (Level 4 30-39 minutes; Level 5 40-54 minutes)      LOUISA Lawler  06/04/202112:43 EDT  Electronically signed     Please note that portions of this note were completed with a voice recognition program. Efforts were made to edit the dictations, but occasionally words are mistranscribed.      CC: Sampson Torres MD

## 2021-06-18 DIAGNOSIS — J45.20 MILD INTERMITTENT ASTHMA WITHOUT COMPLICATION: ICD-10-CM

## 2021-06-18 DIAGNOSIS — J30.1 SEASONAL ALLERGIC RHINITIS DUE TO POLLEN: ICD-10-CM

## 2021-06-18 RX ORDER — AMLODIPINE BESYLATE 10 MG/1
TABLET ORAL
Qty: 90 TABLET | Refills: 3 | Status: SHIPPED | OUTPATIENT
Start: 2021-06-18 | End: 2022-07-11 | Stop reason: SDUPTHER

## 2021-06-21 RX ORDER — FLUTICASONE PROPIONATE 50 MCG
SPRAY, SUSPENSION (ML) NASAL
Qty: 15.8 ML | Refills: 10 | Status: SHIPPED | OUTPATIENT
Start: 2021-06-21 | End: 2021-08-23

## 2021-07-16 RX ORDER — LISINOPRIL 40 MG/1
TABLET ORAL
Qty: 90 TABLET | Refills: 3 | Status: SHIPPED | OUTPATIENT
Start: 2021-07-16 | End: 2022-07-11

## 2021-08-22 DIAGNOSIS — J45.20 MILD INTERMITTENT ASTHMA WITHOUT COMPLICATION: ICD-10-CM

## 2021-08-22 DIAGNOSIS — J30.1 SEASONAL ALLERGIC RHINITIS DUE TO POLLEN: ICD-10-CM

## 2021-08-23 RX ORDER — FLUTICASONE PROPIONATE 50 MCG
SPRAY, SUSPENSION (ML) NASAL
Qty: 1 G | Refills: 3 | Status: SHIPPED | OUTPATIENT
Start: 2021-08-23

## 2021-09-08 ENCOUNTER — TELEPHONE (OUTPATIENT)
Dept: PULMONOLOGY | Facility: CLINIC | Age: 76
End: 2021-09-08

## 2021-09-08 NOTE — TELEPHONE ENCOUNTER
Patient called about Pap Recall. We discussed the RISKS/BENEFITS of using/not using PAP. We discussed the recall. Patient verbalized that he will continue to use the machine until it is replaced.

## 2021-09-29 ENCOUNTER — HOSPITAL ENCOUNTER (OUTPATIENT)
Dept: CARDIOLOGY | Facility: HOSPITAL | Age: 76
Discharge: HOME | End: 2021-09-29
Admitting: INTERNAL MEDICINE

## 2021-09-29 VITALS
BODY MASS INDEX: 29.66 KG/M2 | HEART RATE: 64 BPM | HEIGHT: 67 IN | WEIGHT: 189 LBS | DIASTOLIC BLOOD PRESSURE: 60 MMHG | SYSTOLIC BLOOD PRESSURE: 134 MMHG

## 2021-09-29 DIAGNOSIS — I10 ESSENTIAL HYPERTENSION: ICD-10-CM

## 2021-09-29 DIAGNOSIS — I25.10 CORONARY ARTERY DISEASE INVOLVING NATIVE CORONARY ARTERY OF NATIVE HEART WITHOUT ANGINA PECTORIS: ICD-10-CM

## 2021-09-29 LAB
BH CV REST NUCLEAR ISOTOPE DOSE: 9.1 MCI
BH CV STRESS BP STAGE 2: NORMAL
BH CV STRESS BP STAGE 4: NORMAL
BH CV STRESS COMMENTS STAGE 1: NORMAL
BH CV STRESS DOSE REGADENOSON STAGE 1: 0.4
BH CV STRESS DURATION MIN STAGE 1: 1
BH CV STRESS DURATION MIN STAGE 2: 1
BH CV STRESS DURATION MIN STAGE 3: 1
BH CV STRESS DURATION MIN STAGE 4: 1
BH CV STRESS DURATION SEC STAGE 1: 0
BH CV STRESS DURATION SEC STAGE 2: 0
BH CV STRESS DURATION SEC STAGE 3: 0
BH CV STRESS DURATION SEC STAGE 4: 0
BH CV STRESS HR STAGE 1: 81
BH CV STRESS HR STAGE 2: 87
BH CV STRESS HR STAGE 3: 78
BH CV STRESS HR STAGE 4: 76
BH CV STRESS NUCLEAR ISOTOPE DOSE: 31.2 MCI
BH CV STRESS O2 STAGE 1: 97
BH CV STRESS O2 STAGE 2: 98
BH CV STRESS O2 STAGE 3: 97
BH CV STRESS O2 STAGE 4: 98
BH CV STRESS PROTOCOL 1: NORMAL
BH CV STRESS RECOVERY BP: NORMAL MMHG
BH CV STRESS RECOVERY HR: 73 BPM
BH CV STRESS RECOVERY O2: 97 %
BH CV STRESS STAGE 1: 1
BH CV STRESS STAGE 2: 2
BH CV STRESS STAGE 3: 3
BH CV STRESS STAGE 4: 4
LV EF NUC BP: 81 %
MAXIMAL PREDICTED HEART RATE: 145 BPM
PERCENT MAX PREDICTED HR: 62.07 %
STRESS BASELINE BP: NORMAL MMHG
STRESS BASELINE HR: 66 BPM
STRESS O2 SAT REST: 96 %
STRESS PERCENT HR: 73 %
STRESS POST ESTIMATED WORKLOAD: 1 METS
STRESS POST EXERCISE DUR MIN: 4 MIN
STRESS POST EXERCISE DUR SEC: 0 SEC
STRESS POST O2 SAT PEAK: 98 %
STRESS POST PEAK BP: NORMAL MMHG
STRESS POST PEAK HR: 90 BPM
STRESS TARGET HR: 123 BPM

## 2021-09-29 PROCEDURE — 0 TECHNETIUM SESTAMIBI: Performed by: INTERNAL MEDICINE

## 2021-09-29 PROCEDURE — 78452 HT MUSCLE IMAGE SPECT MULT: CPT

## 2021-09-29 PROCEDURE — 78452 HT MUSCLE IMAGE SPECT MULT: CPT | Performed by: INTERNAL MEDICINE

## 2021-09-29 PROCEDURE — 93017 CV STRESS TEST TRACING ONLY: CPT

## 2021-09-29 PROCEDURE — A9500 TC99M SESTAMIBI: HCPCS | Performed by: INTERNAL MEDICINE

## 2021-09-29 PROCEDURE — 25010000002 REGADENOSON 0.4 MG/5ML SOLUTION: Performed by: INTERNAL MEDICINE

## 2021-09-29 PROCEDURE — 93018 CV STRESS TEST I&R ONLY: CPT | Performed by: INTERNAL MEDICINE

## 2021-09-29 RX ADMIN — TECHNETIUM TC 99M SESTAMIBI 1 DOSE: 1 INJECTION INTRAVENOUS at 10:20

## 2021-09-29 RX ADMIN — TECHNETIUM TC 99M SESTAMIBI 1 DOSE: 1 INJECTION INTRAVENOUS at 08:30

## 2021-09-29 RX ADMIN — REGADENOSON 0.4 MG: 0.08 INJECTION, SOLUTION INTRAVENOUS at 10:21

## 2021-10-05 ENCOUNTER — OFFICE VISIT (OUTPATIENT)
Dept: ENDOCRINOLOGY | Facility: CLINIC | Age: 76
End: 2021-10-05

## 2021-10-05 VITALS
BODY MASS INDEX: 29.82 KG/M2 | HEART RATE: 58 BPM | OXYGEN SATURATION: 98 % | WEIGHT: 190 LBS | SYSTOLIC BLOOD PRESSURE: 132 MMHG | DIASTOLIC BLOOD PRESSURE: 44 MMHG | HEIGHT: 67 IN

## 2021-10-05 DIAGNOSIS — E78.2 MIXED HYPERLIPIDEMIA: ICD-10-CM

## 2021-10-05 DIAGNOSIS — I25.10 CORONARY ARTERY DISEASE INVOLVING NATIVE CORONARY ARTERY OF NATIVE HEART WITHOUT ANGINA PECTORIS: ICD-10-CM

## 2021-10-05 DIAGNOSIS — E11.69 TYPE 2 DIABETES MELLITUS WITH OTHER SPECIFIED COMPLICATION, WITH LONG-TERM CURRENT USE OF INSULIN (HCC): Primary | ICD-10-CM

## 2021-10-05 DIAGNOSIS — Z79.4 TYPE 2 DIABETES MELLITUS WITH OTHER SPECIFIED COMPLICATION, WITH LONG-TERM CURRENT USE OF INSULIN (HCC): Primary | ICD-10-CM

## 2021-10-05 LAB
EXPIRATION DATE: NORMAL
GLUCOSE BLDC GLUCOMTR-MCNC: 171 MG/DL (ref 70–130)
HBA1C MFR BLD: 7.8 %
Lab: NORMAL

## 2021-10-05 PROCEDURE — 83036 HEMOGLOBIN GLYCOSYLATED A1C: CPT | Performed by: INTERNAL MEDICINE

## 2021-10-05 PROCEDURE — 99214 OFFICE O/P EST MOD 30 MIN: CPT | Performed by: INTERNAL MEDICINE

## 2021-10-05 PROCEDURE — 3051F HG A1C>EQUAL 7.0%<8.0%: CPT | Performed by: INTERNAL MEDICINE

## 2021-10-05 PROCEDURE — 82962 GLUCOSE BLOOD TEST: CPT | Performed by: INTERNAL MEDICINE

## 2021-10-05 RX ORDER — DULAGLUTIDE 0.75 MG/.5ML
0.75 INJECTION, SOLUTION SUBCUTANEOUS
Qty: 2 PEN | Refills: 0 | Status: SHIPPED | OUTPATIENT
Start: 2021-10-05 | End: 2022-02-09 | Stop reason: SDUPTHER

## 2021-10-05 NOTE — PROGRESS NOTES
"Chief Complaint   Patient presents with   • Diabetes          HPI   Rashad Mendez is a 76 y.o. male had concerns including Diabetes.    He is checking blood sugars 0 times per day.  Current medications for diabetes include mixed insulin 42 units twice daily.    Patient never received the Trulicity prescription from the pharmacy.  Has chronic diarrhea.  Feeling okay.  No other new concerns or complaints.    The following portions of the patient's history were reviewed and updated as appropriate: allergies, current medications, past family history, past medical history, past social history, past surgical history and problem list.      Review of Systems   Gastrointestinal: Positive for diarrhea.   Endocrine: Positive for cold intolerance.        Physical Exam  Vitals reviewed.   Constitutional:       Appearance: Normal appearance. He is obese.      Comments: Body mass index is 29.76 kg/m².   Cardiovascular:      Rate and Rhythm: Normal rate.   Pulmonary:      Effort: Pulmonary effort is normal.   Neurological:      General: No focal deficit present.      Mental Status: He is alert. Mental status is at baseline.   Psychiatric:         Mood and Affect: Mood normal.         Behavior: Behavior normal.        /44   Pulse 58   Ht 170.2 cm (67\")   Wt 86.2 kg (190 lb)   SpO2 98%   BMI 29.76 kg/m²      Labs and imaging    CMP:  Lab Results   Component Value Date     (H) 03/31/2021    BUN 19 03/31/2021    CREATININE 1.29 (H) 03/31/2021    EGFRIFNONA 54 (L) 03/31/2021    EGFRIFAFRI 66 03/31/2021    BCR 14.7 03/31/2021     03/31/2021    K 4.5 03/31/2021    CO2 24.3 03/31/2021    CALCIUM 9.0 03/31/2021    PROTENTOTREF 7.1 03/31/2021    ALBUMIN 4.20 03/31/2021    LABGLOBREF 2.9 03/31/2021    LABIL2 1.4 03/31/2021    BILITOT 0.6 03/31/2021    ALKPHOS 87 03/31/2021    AST 23 03/31/2021    ALT 27 03/31/2021     Lipid Panel:  Lab Results   Component Value Date    TRIG 257 (H) 03/31/2021    HDL 31 (L) 03/31/2021 "    VLDL 40 03/31/2021    LDL 44 03/31/2021     HbA1c:  Lab Results   Component Value Date    HGBA1C 7.8 10/05/2021    HGBA1C 7.5 03/31/2021     Glucose:    Lab Results   Component Value Date    POCGLU 171 (A) 10/05/2021     Microalbumin:  Lab Results   Component Value Date    MALBCRERATIO 100 (H) 08/26/2020     TSH:  Lab Results   Component Value Date    TSH 2.890 03/31/2021       Assessment and plan  Diagnoses and all orders for this visit:    1. Type 2 diabetes mellitus with other specified complication, with long-term current use of insulin (HCC) (Primary)  Uncontrolled with hyperglycemia.  A1c up to 7.8.  Patient is not monitoring his BG's.  Complicated by CKD, microalbuminuria, retinopathy.  Continue mixed insulin 42 units twice daily but it is imperative the patient take the dose of insulin before meals rather than at the completion.  Other non-insulin medications limited due to CKD.  History of GI intolerance to Metformin.  Sample of Trulicity was given.  Cautioned regarding possible GI upset.  Lipid and CMP up-to-date from March.  Check urine microalbumin at follow-up visit.  Monofilament up-to-date from March.  -     POC Glycosylated Hemoglobin (Hb A1C)  -     POC Glucose Fingerstick  -     Dulaglutide (Trulicity) 0.75 MG/0.5ML solution pen-injector; Inject 0.75 mg under the skin into the appropriate area as directed Every 7 (Seven) Days. SAMPLE  Dispense: 2 pen; Refill: 0    2. Mixed hyperlipidemia  Lipids at goal on atorvastatin 40 mg daily.  Monitor yearly.  Last labs checked in March.    3. Coronary artery disease involving native coronary artery of native heart without angina pectoris  Due to history of CAD, Trulicity is indicated as above.       Return in about 4 months (around 2/5/2022) for next scheduled follow up. The patient was instructed to contact the clinic with any interval questions or concerns.    Dorita Mitchell,    Endocrinologist    Please note that portions of this document were  completed with a voice recognition program. Efforts were made to edit the dictations, but occasionally words are mis-transcribed.,

## 2021-10-21 DIAGNOSIS — E11.65 UNCONTROLLED TYPE 2 DIABETES MELLITUS WITH HYPERGLYCEMIA (HCC): ICD-10-CM

## 2021-10-21 RX ORDER — INSULIN ASPART 100 [IU]/ML
INJECTION, SUSPENSION SUBCUTANEOUS
Qty: 75 ML | Refills: 1 | Status: SHIPPED | OUTPATIENT
Start: 2021-10-21 | End: 2021-10-21 | Stop reason: SDUPTHER

## 2021-10-21 RX ORDER — INSULIN ASPART 100 [IU]/ML
42 INJECTION, SUSPENSION SUBCUTANEOUS
Qty: 75 ML | Refills: 1 | Status: SHIPPED | OUTPATIENT
Start: 2021-10-21 | End: 2022-02-09 | Stop reason: SDUPTHER

## 2021-10-21 RX ORDER — PEN NEEDLE, DIABETIC 32GX 5/32"
1 NEEDLE, DISPOSABLE MISCELLANEOUS 2 TIMES DAILY
Qty: 200 EACH | Refills: 3 | Status: SHIPPED | OUTPATIENT
Start: 2021-10-21 | End: 2023-01-04

## 2021-10-21 NOTE — TELEPHONE ENCOUNTER
PT CALLED REQUESTING REFILLS OF NOVOLOG PENS (3MOS REFILLS) AND PEN NEEDLES TO BE SENT IN TO [facility] IN [place], [place]. PLEASE AND THANK YOU

## 2021-11-15 RX ORDER — MONTELUKAST SODIUM 4 MG/1
TABLET, CHEWABLE ORAL
Qty: 120 TABLET | Refills: 2 | Status: SHIPPED | OUTPATIENT
Start: 2021-11-15 | End: 2022-01-25 | Stop reason: SDUPTHER

## 2021-11-15 NOTE — TELEPHONE ENCOUNTER
Patient needs a refill on Colestid.  He uses [facility] [place].  I notified the patient that he will need a follow up appt, but we can send in enough refills to last until that appointment.

## 2021-11-17 RX ORDER — INDAPAMIDE 1.25 MG/1
TABLET, FILM COATED ORAL
Qty: 90 TABLET | Refills: 0 | Status: SHIPPED | OUTPATIENT
Start: 2021-11-17 | End: 2022-02-14

## 2021-11-17 RX ORDER — INDAPAMIDE 1.25 MG/1
TABLET, FILM COATED ORAL
Qty: 90 TABLET | Refills: 0 | OUTPATIENT
Start: 2021-11-17

## 2021-11-17 NOTE — TELEPHONE ENCOUNTER
Lab Results   Component Value Date    GLUCOSE 107 (H) 03/31/2021    CALCIUM 9.0 03/31/2021     03/31/2021    K 4.5 03/31/2021    CO2 24.3 03/31/2021     03/31/2021    BUN 19 03/31/2021    CREATININE 1.29 (H) 03/31/2021    EGFRIFAFRI 66 03/31/2021    EGFRIFNONA 54 (L) 03/31/2021    BCR 14.7 03/31/2021    ANIONGAP 3.0 12/07/2016         Requested recent labs from pcp

## 2021-11-19 DIAGNOSIS — E78.5 DYSLIPIDEMIA: Primary | ICD-10-CM

## 2021-12-09 ENCOUNTER — OFFICE VISIT (OUTPATIENT)
Dept: PULMONOLOGY | Facility: CLINIC | Age: 76
End: 2021-12-09

## 2021-12-09 VITALS
RESPIRATION RATE: 16 BRPM | TEMPERATURE: 98.4 F | SYSTOLIC BLOOD PRESSURE: 138 MMHG | BODY MASS INDEX: 29.57 KG/M2 | HEART RATE: 63 BPM | HEIGHT: 67 IN | DIASTOLIC BLOOD PRESSURE: 64 MMHG | OXYGEN SATURATION: 99 % | WEIGHT: 188.38 LBS

## 2021-12-09 DIAGNOSIS — J45.20 MILD INTERMITTENT ASTHMA WITHOUT COMPLICATION: ICD-10-CM

## 2021-12-09 DIAGNOSIS — K21.9 GASTROESOPHAGEAL REFLUX DISEASE, UNSPECIFIED WHETHER ESOPHAGITIS PRESENT: ICD-10-CM

## 2021-12-09 DIAGNOSIS — K21.9 GASTROESOPHAGEAL REFLUX DISEASE: ICD-10-CM

## 2021-12-09 DIAGNOSIS — G47.33 OBSTRUCTIVE SLEEP APNEA SYNDROME: ICD-10-CM

## 2021-12-09 DIAGNOSIS — J30.1 SEASONAL ALLERGIC RHINITIS DUE TO POLLEN: Primary | ICD-10-CM

## 2021-12-09 PROCEDURE — 99214 OFFICE O/P EST MOD 30 MIN: CPT | Performed by: NURSE PRACTITIONER

## 2021-12-09 RX ORDER — MONTELUKAST SODIUM 10 MG/1
10 TABLET ORAL NIGHTLY
Qty: 90 TABLET | Refills: 3 | Status: SHIPPED | OUTPATIENT
Start: 2021-12-09 | End: 2023-01-30

## 2021-12-09 RX ORDER — OMEPRAZOLE 40 MG/1
40 CAPSULE, DELAYED RELEASE ORAL DAILY
Qty: 90 CAPSULE | Refills: 3 | Status: SHIPPED | OUTPATIENT
Start: 2021-12-09 | End: 2022-08-12 | Stop reason: CLARIF

## 2021-12-09 RX ORDER — ALBUTEROL SULFATE 90 UG/1
2 AEROSOL, METERED RESPIRATORY (INHALATION) EVERY 4 HOURS PRN
Qty: 8 G | Refills: 5 | Status: SHIPPED | OUTPATIENT
Start: 2021-12-09 | End: 2022-06-09 | Stop reason: SDUPTHER

## 2021-12-09 NOTE — PROGRESS NOTES
[facility] Pulmonary Follow up    CHIEF COMPLAINT    dyspnea    HISTORY OF PRESENT ILLNESS    Rashad Mendez is a 76 y.o.male here today for follow-up of his asthma.  He was last seen in the office by me in June.  He contacted the COVID-19 virus in October but did not require any treatment.  He states he did notice some worsening shortness of breath.  He states that his breathing has yet to return back to his baseline.    He has noticed more shortness of breath with exertion.  He does take breaks to recover.    He continues to use his Advair twice a day.  He also has his albuterol rescue inhaler to use if needed for shortness of breath.    He continues to wear his CPAP every night for EVA.  He did get a recall on his machine and is wondering if he should continue using his machine.  He is yet to register his machine on the recall list.  He denies any sleeping difficulties.  He does average 6 to 8 hours every night.    He denies fever, chills, sputum production, hemoptysis, night sweats, weight loss, chest pain or palpitations.  He continues to have difficulty with chronic sinus drainage and postnasal drip.  He takes Flonase, Claritin and Singulair daily.  He denies reflux symptoms and takes omeprazole daily.     He is up-to-date on his current vaccinations.  He is accompanied today by his wife.  Patient Active Problem List   Diagnosis   • Coronary artery disease   • Cerebrovascular disease   • Diabetic retinopathy (HCC)   • Gastroesophageal reflux disease   • Hypertension   • Adiposity   • Obstructive sleep apnea syndrome   • Peripheral vascular disease (HCC)   • Acute cerebral infarction (HCC)   • Allergic rhinitis   • Asthma   • Blurry vision   • Carpal tunnel syndrome   • Diverticulosis   • Dyslipidemia   • Edema   • Irritable bowel syndrome   • Long term current use of insulin (HCC)   • Oral thrush   • Paresthesia   • Restrictive lung disease   • Type 2 diabetes mellitus with hyperglycemia, with long-term current  use of insulin (HCC)   • Diverticulitis   • Adenomatous polyp of sigmoid colon   • Stage 3a chronic kidney disease (HCC)       Allergies   Allergen Reactions   • Latex Rash   • Levofloxacin Anaphylaxis   • Sulfa Antibiotics Anaphylaxis   • Beta Adrenergic Blockers Other (See Comments)     Significant bradycardia   • Diovan [Valsartan] Diarrhea   • Entex Lq [Phenylephrine-Guaifenesin] Hives       Current Outpatient Medications:   •  Accu-Chek Softclix Lancets lancets, Test Three Times daily, Disp: 300 each, Rfl: 2  •  Accu-Chek Softclix Lancets lancets, 1 each by Other route 2 (two) times a day. Dx E11.65, Disp: 60 each, Rfl: 5  •  amLODIPine (NORVASC) 10 MG tablet, TAKE ONE TABLET BY MOUTH DAILY, Disp: 90 tablet, Rfl: 3  •  atorvastatin (LIPITOR) 40 MG tablet, TAKE ONE TABLET BY MOUTH AT BEDTIME,NEED APPOINTMENT FOR MORE REFILLS, Disp: 90 tablet, Rfl: 2  •  Blood Glucose Monitoring Suppl (ACCU-CHEK COMPACT CARE KIT) kit, Pt. Is visually impaired - qualifies for this meter, Disp: 1 each, Rfl: 0  •  clopidogrel (PLAVIX) 75 MG tablet, TAKE ONE TABLET BY MOUTH DAILY, Disp: 90 tablet, Rfl: 3  •  colestipol (Micronized Colestipol HCl) 1 g tablet, Take 2 tablets by mouth two times daily, separate 2 hours from other medications., Disp: 120 tablet, Rfl: 2  •  Dulaglutide (Trulicity) 0.75 MG/0.5ML solution pen-injector, Inject 0.75 mg under the skin into the appropriate area as directed Every 7 (Seven) Days. SAMPLE, Disp: 2 pen, Rfl: 0  •  fluticasone (FLONASE) 50 MCG/ACT nasal spray, SPRAY TWO SPRAYS IN EACH NOSTRIL ONCE DAILY, Disp: 1 g, Rfl: 3  •  fluticasone-salmeterol (Advair Diskus) 250-50 MCG/DOSE DISKUS, Inhale 1 puff 2 (Two) Times a Day., Disp: 180 each, Rfl: 3  •  glucose blood (ACCU-CHEK COMPACT TEST DRUM) test strip, Uses X 3 /day, Disp: 102 each, Rfl: 12  •  indapamide (LOZOL) 1.25 MG tablet, TAKE ONE TABLET BY MOUTH DAILY, Disp: 90 tablet, Rfl: 0  •  Insulin Aspart Prot & Aspart (Insulin Asp Prot & Asp FlexPen)  (70-30) 100 UNIT/ML suspension pen-injector, Inject 42 Units under the skin into the appropriate area as directed Daily With Breakfast & Dinner., Disp: 75 mL, Rfl: 1  •  Insulin Pen Needle (BD Pen Needle Melody U/F) 32G X 4 MM misc, Inject 1 each under the skin into the appropriate area as directed 2 (two) times a day., Disp: 200 each, Rfl: 3  •  lisinopril (PRINIVIL,ZESTRIL) 40 MG tablet, TAKE ONE TABLET BY MOUTH DAILY, Disp: 90 tablet, Rfl: 3  •  Loratadine (Claritin) 10 MG capsule, Take 1 capsule by mouth Daily., Disp: 90 each, Rfl: 3  •  montelukast (SINGULAIR) 10 MG tablet, Take 1 tablet by mouth Every Night., Disp: 90 tablet, Rfl: 3  •  nitroglycerin (NITROSTAT) 0.4 MG SL tablet, Place 1 tablet under the tongue Every 5 (Five) Minutes As Needed for Chest Pain., Disp: 25 tablet, Rfl: 11  •  omeprazole (priLOSEC) 40 MG capsule, Take 1 capsule by mouth Daily., Disp: 90 capsule, Rfl: 3  •  prednisoLONE acetate (PRED FORTE) 1 % ophthalmic suspension, , Disp: , Rfl:   •  terazosin (HYTRIN) 10 MG capsule, TAKE ONE CAPSULE BY MOUTH EVERY NIGHT AT BEDTIME, Disp: 90 capsule, Rfl: 3  •  Ventolin  (90 Base) MCG/ACT inhaler, Inhale 2 puffs Every 4 (Four) Hours As Needed for Wheezing., Disp: 8 g, Rfl: 5  MEDICATION LIST AND ALLERGIES REVIEWED.    Social History     Tobacco Use   • Smoking status: Former Smoker     Types: Cigarettes     Quit date: 2016     Years since quittin.6   • Smokeless tobacco: Never Used   Vaping Use   • Vaping Use: Never used   Substance Use Topics   • Alcohol use: No   • Drug use: No       FAMILY AND SOCIAL HISTORY REVIEWED.    Review of Systems   Constitutional: Negative for activity change, appetite change, fatigue, fever and unexpected weight change.   HENT: Negative for congestion, postnasal drip, rhinorrhea, sinus pressure, sore throat and voice change.    Eyes: Negative for visual disturbance.   Respiratory: Positive for shortness of breath. Negative for cough, chest tightness and  "wheezing.    Cardiovascular: Negative for chest pain, palpitations and leg swelling.   Gastrointestinal: Negative for abdominal distention, abdominal pain, nausea and vomiting.   Endocrine: Negative for cold intolerance and heat intolerance.   Genitourinary: Negative for difficulty urinating and urgency.   Musculoskeletal: Negative for arthralgias, back pain and neck pain.   Skin: Negative for color change and pallor.   Allergic/Immunologic: Negative for environmental allergies and food allergies.   Neurological: Negative for dizziness, syncope, weakness and light-headedness.   Hematological: Negative for adenopathy. Does not bruise/bleed easily.   Psychiatric/Behavioral: Negative for agitation and behavioral problems.   .    /64 (BP Location: Left arm, Patient Position: Sitting, Cuff Size: Adult)   Pulse 63   Temp 98.4 °F (36.9 °C)   Resp 16   Ht 170.2 cm (67\")   Wt 85.4 kg (188 lb 6 oz)   SpO2 99% Comment: room air at rest  BMI 29.50 kg/m²     Immunization History   Administered Date(s) Administered   • COVID-19 (MODERNA) 1st, 2nd, 3rd Dose Only 02/23/2021, 03/25/2021   • Fluzone High Dose =>65 Years ([employer] ONLY) 11/06/2017, 10/29/2018   • Influenza, Unspecified 10/03/2019, 10/01/2020   • Pneumococcal Conjugate 13-Valent (PCV13) 11/06/2017   • Td 03/19/1997   • influenza Split 12/07/2016       Physical Exam  Vitals reviewed.   Constitutional:       Appearance: He is well-developed.   HENT:      Head: Normocephalic and atraumatic.   Eyes:      Pupils: Pupils are equal, round, and reactive to light.   Cardiovascular:      Rate and Rhythm: Normal rate and regular rhythm.      Heart sounds: Normal heart sounds.   Pulmonary:      Effort: Pulmonary effort is normal.      Breath sounds: Normal breath sounds. No wheezing or rales.   Chest:      Chest wall: No tenderness.   Abdominal:      General: Bowel sounds are normal.      Palpations: Abdomen is soft.   Musculoskeletal:         General: No swelling. " Normal range of motion.      Cervical back: Normal range of motion and neck supple.   Skin:     General: Skin is warm and dry.      Capillary Refill: Capillary refill takes less than 2 seconds.   Neurological:      Mental Status: He is alert and oriented to person, place, and time.   Psychiatric:         Mood and Affect: Mood normal.         Behavior: Behavior normal.           RESULTS      PROBLEM LIST    Problem List Items Addressed This Visit        Allergies and Adverse Reactions    Allergic rhinitis - Primary    Relevant Medications    montelukast (SINGULAIR) 10 MG tablet       Gastrointestinal Abdominal     Gastroesophageal reflux disease    Relevant Medications    omeprazole (priLOSEC) 40 MG capsule       Pulmonary and Pneumonias    Asthma    Relevant Medications    fluticasone-salmeterol (Advair Diskus) 250-50 MCG/DOSE DISKUS    Ventolin  (90 Base) MCG/ACT inhaler    montelukast (SINGULAIR) 10 MG tablet       Sleep    Obstructive sleep apnea syndrome    Overview     Description: A.  On CPAP therapy daily at bedtime.                 DISCUSSION    Mr. Mendez is here for follow-up of his asthma.  He seems to be doing fairly well since he contacted the COVID-[employer] virus in October.  He does continue to have some difficulty with dyspnea with exertion.  Did encourage him to stay as physically active as possible.    He will continue Advair twice a day.  I did send in refills today for him.  He will continue to use his albuterol as needed for shortness of breath.    We did discuss the CPAP recall and I did encourage him to continue wearing his CPAP every night as he does need this for his EVA.  I did encourage him to wash his equipment thoroughly and to register his machine online to get a new CPAP machine in the near future.    I did review his CPAP download in the office today and he is compliant and benefits from his CPAP machine.    He will continue omeprazole daily for GERD.  We also discussed reflux  precautions in the office today.    He will continue Singulair nightly for his allergic rhinitis and asthma.    He will follow-up in 6 months with full PFTs and a chest x-ray or sooner if his symptoms worsen.    Level of service justified based on 36 minutes spent in patient care on this date of service including, but not limited to: preparing to see the patient, obtaining and/or reviewing history, performing medically appropriate examination, ordering tests/medicine/procedures, independently interpreting results, documenting clinical information in EHR, and counseling/education of patient/family/caregiver. (Level 4 30-39 minutes; Level 5 40-54 minutes)      Gilda Ma, APRN  12/09/202109:27 EST  Electronically signed     Please note that portions of this note were completed with a voice recognition program.        CC: Sampson Torres MD

## 2021-12-30 RX ORDER — ATORVASTATIN CALCIUM 40 MG/1
40 TABLET, FILM COATED ORAL NIGHTLY
Qty: 90 TABLET | Refills: 0 | Status: SHIPPED | OUTPATIENT
Start: 2021-12-30 | End: 2022-04-06

## 2022-01-26 RX ORDER — MONTELUKAST SODIUM 4 MG/1
TABLET, CHEWABLE ORAL
Qty: 120 TABLET | Refills: 2 | Status: SHIPPED | OUTPATIENT
Start: 2022-01-26 | End: 2022-04-18 | Stop reason: SDUPTHER

## 2022-02-09 ENCOUNTER — OFFICE VISIT (OUTPATIENT)
Dept: ENDOCRINOLOGY | Facility: CLINIC | Age: 77
End: 2022-02-09

## 2022-02-09 VITALS
BODY MASS INDEX: 29.91 KG/M2 | SYSTOLIC BLOOD PRESSURE: 130 MMHG | WEIGHT: 190.6 LBS | DIASTOLIC BLOOD PRESSURE: 62 MMHG | OXYGEN SATURATION: 96 % | HEART RATE: 65 BPM | HEIGHT: 67 IN | RESPIRATION RATE: 16 BRPM

## 2022-02-09 DIAGNOSIS — N18.31 TYPE 2 DIABETES MELLITUS WITH STAGE 3A CHRONIC KIDNEY DISEASE, WITH LONG-TERM CURRENT USE OF INSULIN: Primary | ICD-10-CM

## 2022-02-09 DIAGNOSIS — E11.22 TYPE 2 DIABETES MELLITUS WITH STAGE 3A CHRONIC KIDNEY DISEASE, WITH LONG-TERM CURRENT USE OF INSULIN: Primary | ICD-10-CM

## 2022-02-09 DIAGNOSIS — Z79.4 TYPE 2 DIABETES MELLITUS WITH STAGE 3A CHRONIC KIDNEY DISEASE, WITH LONG-TERM CURRENT USE OF INSULIN: Primary | ICD-10-CM

## 2022-02-09 DIAGNOSIS — E78.2 MIXED HYPERLIPIDEMIA: ICD-10-CM

## 2022-02-09 LAB
EXPIRATION DATE: ABNORMAL
EXPIRATION DATE: NORMAL
GLUCOSE BLDC GLUCOMTR-MCNC: 198 MG/DL (ref 70–130)
HBA1C MFR BLD: 7.9 %
Lab: ABNORMAL
Lab: NORMAL

## 2022-02-09 PROCEDURE — 82962 GLUCOSE BLOOD TEST: CPT | Performed by: INTERNAL MEDICINE

## 2022-02-09 PROCEDURE — 3051F HG A1C>EQUAL 7.0%<8.0%: CPT | Performed by: INTERNAL MEDICINE

## 2022-02-09 PROCEDURE — 99214 OFFICE O/P EST MOD 30 MIN: CPT | Performed by: INTERNAL MEDICINE

## 2022-02-09 PROCEDURE — 83036 HEMOGLOBIN GLYCOSYLATED A1C: CPT | Performed by: INTERNAL MEDICINE

## 2022-02-09 RX ORDER — BLOOD-GLUCOSE METER
1 KIT MISCELLANEOUS 2 TIMES DAILY
Qty: 1 EACH | Refills: 0 | Status: SHIPPED | OUTPATIENT
Start: 2022-02-09 | End: 2022-03-01 | Stop reason: SDUPTHER

## 2022-02-09 RX ORDER — DULAGLUTIDE 0.75 MG/.5ML
0.75 INJECTION, SOLUTION SUBCUTANEOUS
Qty: 2 ML | Refills: 5 | Status: SHIPPED | OUTPATIENT
Start: 2022-02-09

## 2022-02-09 RX ORDER — DAPAGLIFLOZIN 10 MG/1
10 TABLET, FILM COATED ORAL DAILY
Qty: 30 TABLET | Refills: 5 | Status: SHIPPED | OUTPATIENT
Start: 2022-02-09 | End: 2022-07-18

## 2022-02-09 RX ORDER — INSULIN ASPART 100 [IU]/ML
42 INJECTION, SUSPENSION SUBCUTANEOUS
Qty: 75 ML | Refills: 1 | Status: SHIPPED | OUTPATIENT
Start: 2022-02-09 | End: 2022-12-05

## 2022-02-09 NOTE — PROGRESS NOTES
"Chief Complaint   Patient presents with   • Type 2 diabetes mellitus with other specified complication,           HPI   Rashad Mendez is a 76 y.o. male had concerns including Type 2 diabetes mellitus with other specified complication, .    He is checking blood sugars 0 times per day. His meter broke.   A1c 7.9 from 7.8 at his last visit here.  Current medications for diabetes include mixed insulin 42 units twice daily.  He hasn't started trulicity. Is concerned about side effects.     The following portions of the patient's history were reviewed and updated as appropriate: allergies, current medications, past family history, past medical history, past social history, past surgical history and problem list.       Review of Systems   Eyes: Positive for visual disturbance.        Physical Exam  Vitals reviewed.   Constitutional:       Appearance: Normal appearance. He is obese.      Comments: Body mass index is 29.85 kg/m².   Cardiovascular:      Rate and Rhythm: Normal rate.   Pulmonary:      Effort: Pulmonary effort is normal.   Neurological:      General: No focal deficit present.      Mental Status: He is alert. Mental status is at baseline.   Psychiatric:         Mood and Affect: Mood normal.         Behavior: Behavior normal.        /62   Pulse 65   Resp 16   Ht 170.2 cm (67\")   Wt 86.5 kg (190 lb 9.6 oz)   SpO2 96%   BMI 29.85 kg/m²      Labs and imaging    CMP:  Lab Results   Component Value Date    BUN 19 03/31/2021    CREATININE 1.29 (H) 03/31/2021    EGFRIFNONA 54 (L) 03/31/2021    EGFRIFAFRI 66 03/31/2021    BCR 14.7 03/31/2021     03/31/2021    K 4.5 03/31/2021    CO2 24.3 03/31/2021    CALCIUM 9.0 03/31/2021    PROTENTOTREF 7.1 03/31/2021    ALBUMIN 4.20 03/31/2021    LABGLOBREF 2.9 03/31/2021    LABIL2 1.4 03/31/2021    BILITOT 0.6 03/31/2021    ALKPHOS 87 03/31/2021    AST 23 03/31/2021    ALT 27 03/31/2021     Lipid Panel:  Lab Results   Component Value Date    TRIG 257 (H) 03/31/2021 "    HDL 31 (L) 03/31/2021    VLDL 40 03/31/2021    LDL 44 03/31/2021     HbA1c:  Lab Results   Component Value Date    HGBA1C 7.9 02/09/2022    HGBA1C 7.8 10/05/2021     Glucose:    Lab Results   Component Value Date    POCGLU 198 (A) 02/09/2022     Microalbumin:  Lab Results   Component Value Date    MALBCRERATIO 100 (H) 08/26/2020     TSH:  Lab Results   Component Value Date    TSH 2.890 03/31/2021       Assessment and plan  Diagnoses and all orders for this visit:    1. Type 2 diabetes mellitus with stage 3a chronic kidney disease, with long-term current use of insulin ([facility]) (Primary)  Uncontrolled with hyperglycemia.  A1c 7.9.  Complicated by CKD 3A, microalbuminuria, retinopathy.  Continue mixed insulin 42 units twice daily.  No metformin due to history of GI intolerance.  Patient has been resistant to starting new medications.  Encouraged him again to try Trulicity.  He is worried about side effects and I reassured him the most common side effect of Trulicity is weight loss and improved glucose control.  It also has good data for cardiac benefit.  Potential for possible GI side effects and if not tolerated we can discontinue the medication.  I have also recommended that he start Farxiga which can improve glucose control, protect kidneys, cardiovascular benefit.  Discussed potential side effect of UTI or yeast infection, mild diuretic.  Check labs at follow-up visit.  Check urine microalbumin today.  Ophtho exam up-to-date within the last few months.  Monofilament up-to-date from March.  Check at follow-up visit.  -     POCT Glucose  -     POC Glycosylated Hemoglobin (Hb A1C)  -     Continuous Blood Gluc  (FreeStyle Jarod 2 Valliant) device; 1 each Daily.  Dispense: 1 each; Refill: 0  -     Continuous Blood Gluc Sensor (FreeStyle Jarod 2 Sensor) misc; 1 each Every 14 (Fourteen) Days.  Dispense: 6 each; Refill: 2  -     glucose monitor monitoring kit; 1 each 2 (Two) Times a Day. E11.65  Dispense: 1 each;  Refill: 0  -     glucose blood (Accu-Chek Compact Test Drum) test strip; Uses 2 times per day. E11.65  Dispense: 200 each; Refill: 3  -     Dapagliflozin Propanediol (Farxiga) 10 MG tablet; Take 10 mg by mouth Daily.  Dispense: 30 tablet; Refill: 5  -     Dulaglutide (Trulicity) 0.75 MG/0.5ML solution pen-injector; Inject 0.75 mg under the skin into the appropriate area as directed Every 7 (Seven) Days.  Dispense: 2 mL; Refill: 5  -     Insulin Aspart Prot & Aspart (Insulin Asp Prot & Asp FlexPen) (70-30) 100 UNIT/ML suspension pen-injector; Inject 42 Units under the skin into the appropriate area as directed Daily With Breakfast & Dinner.  Dispense: 75 mL; Refill: 1  -     Microalbumin / Creatinine Urine Ratio - Urine, Clean Catch    2. Mixed hyperlipidemia  Controlled LDL on atorvastatin 40 mg daily.  Monitor yearly.  Fasting labs after next visit.      Return in about 4 months (around 6/9/2022) for next scheduled follow up. The patient was instructed to contact the clinic with any interval questions or concerns.    Dorita Mitchell, DO   Endocrinologist    Please note that portions of this note were completed with a voice recognition program.

## 2022-02-10 LAB
ALBUMIN/CREAT UR: <16 MG/G CREAT (ref 0–29)
CREAT UR-MCNC: 18.8 MG/DL
MICROALBUMIN UR-MCNC: <3 UG/ML

## 2022-02-14 RX ORDER — INDAPAMIDE 1.25 MG/1
TABLET, FILM COATED ORAL
Qty: 90 TABLET | Refills: 0 | Status: SHIPPED | OUTPATIENT
Start: 2022-02-14 | End: 2022-05-12

## 2022-03-01 ENCOUNTER — TELEPHONE (OUTPATIENT)
Dept: ENDOCRINOLOGY | Facility: CLINIC | Age: 77
End: 2022-03-01

## 2022-03-01 DIAGNOSIS — E11.22 TYPE 2 DIABETES MELLITUS WITH STAGE 3A CHRONIC KIDNEY DISEASE, WITH LONG-TERM CURRENT USE OF INSULIN: ICD-10-CM

## 2022-03-01 DIAGNOSIS — Z79.4 TYPE 2 DIABETES MELLITUS WITH STAGE 3A CHRONIC KIDNEY DISEASE, WITH LONG-TERM CURRENT USE OF INSULIN: ICD-10-CM

## 2022-03-01 DIAGNOSIS — N18.31 TYPE 2 DIABETES MELLITUS WITH STAGE 3A CHRONIC KIDNEY DISEASE, WITH LONG-TERM CURRENT USE OF INSULIN: ICD-10-CM

## 2022-03-01 RX ORDER — BLOOD-GLUCOSE METER
1 KIT MISCELLANEOUS 2 TIMES DAILY
Qty: 1 EACH | Refills: 0 | Status: SHIPPED | OUTPATIENT
Start: 2022-03-01

## 2022-03-01 NOTE — TELEPHONE ENCOUNTER
Patient called and stated Dr. Mitchell was going to send in a new device for him because his Accu-Chek was no longer working. He stated his pharmacy does not have the rx for this and he has not started the Trulicity because he cannot check his blood sugar. Please advise.

## 2022-04-05 ENCOUNTER — OFFICE VISIT (OUTPATIENT)
Dept: GASTROENTEROLOGY | Facility: CLINIC | Age: 77
End: 2022-04-05

## 2022-04-05 VITALS
HEART RATE: 67 BPM | SYSTOLIC BLOOD PRESSURE: 150 MMHG | TEMPERATURE: 98.2 F | HEIGHT: 67 IN | WEIGHT: 191.4 LBS | BODY MASS INDEX: 30.04 KG/M2 | DIASTOLIC BLOOD PRESSURE: 60 MMHG

## 2022-04-05 DIAGNOSIS — K58.0 IRRITABLE BOWEL SYNDROME WITH DIARRHEA: Primary | ICD-10-CM

## 2022-04-05 DIAGNOSIS — R14.2 ERUCTATION: ICD-10-CM

## 2022-04-05 PROCEDURE — 99213 OFFICE O/P EST LOW 20 MIN: CPT | Performed by: NURSE PRACTITIONER

## 2022-04-05 RX ORDER — CODEINE PHOSPHATE AND GUAIFENESIN 10; 100 MG/5ML; MG/5ML
SOLUTION ORAL
COMMUNITY
Start: 2022-03-17

## 2022-04-05 NOTE — PROGRESS NOTES
GASTROENTEROLOGY OFFICE NOTE  Rashad Mendez  [id number]  1945    CARE TEAM  Patient Care Team:  Sampson Torres MD as PCP - Dorita Macedo DO (Internal Medicine)  Ofelia Sexton MD as Consulting Physician (Cardiology)  Vivienne Gottlieb MD as Consulting Physician (Dermatology)    Referring Provider: Sampson Torres MD    Chief Complaint   Patient presents with   • Diarrhea        HISTORY OF PRESENT ILLNESS:  Patient seen today for refills of colestid. He is taking 2 grams of colestid daily for IBS-D and reports that he has no diarrhea as long as he takes colestid.     I saw his wife in the office for an appointment just before him and she expressed to me that she is very concerned about the patient because he belches all the time. Patient no complaints to me other than he needs refills of colestid and wonders if his family doctor can prescribe this rather than having to follow up with GI for refills. I further questioned patient about his wife's concerns and he admits that he does have a lot of belching and his stomach aches a lot but this has been an ongoing problem for him for the past 10 years or more and directly correlates with his diet. He drinks several diet sodas throughout the day, lactose seems to bother him    PAST MEDICAL HISTORY  Past Medical History:   Diagnosis Date   • Allergic rhinitis 9/15/2016   • Blurry vision 9/15/2016   • Cancer (HCC)    • Carotid artery stenosis       Carotid duplex of 02/25/2014 reports nonobstructive plaque in both proximal internal carotid arteries.   • Coronary artery disease    • COVID    • CVA (cerebrovascular accident) ([facility]) 02/24/2014    felt secondary to accelerated hypertension: a. Acute lacunar infarct. b. 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 c.Subsequent admission, 03/01/2014, for acute on subacute ischemic stroke (right thalamic) with the addition of Plavix. d.  Diabetes, uncontrolled.   • Food poisoning     • GERD (gastroesophageal reflux disease)    • H/O echocardiogram     · A.  Echocardiogram of 02/25/2014 reports an ejection fraction of 55-60%, mild concentric   LVH, trace mitral and pulmonic regurgitation, mild tricuspid regurgitation and calculated   • History of chest x-ray 12/09/2014    No acute chest pathology   • History of chest x-ray 11/18/2012    Compared to previous study of 11/17/2012, [place] Catheter has been removed. Cardiomegaly is noted with central congestion and edema. Also there is volume loss at the bases with bibasilar pulmonary opacities and small effusions. These congestive changes are slightly worse since 11/17/2012   • History of chest x-ray 11/17/2012    [place] catheter remains in right main pulmonary artery. Chest tubes well positioned.There is cardiomegaly with volume loss at both bases. Airspace opacity and consolidation is sharifa at the left base and there is mild central congestion.   • History of echocardiogram 02/25/2014    The estimated EF is 55-60%. Mild concentric LVH observed. Trace MR. Mild TR.    • History of PFTs 09/09/2015    Spirometry data is acceptable and reproducible. Patient gave a good effort   • History of PFTs 01/30/2015    No obstruction. Mild to moderate restriction. No air trapping. Low MVV.Low DLCO which corrects when adjusted for alveolar volumes. FEV1 is decreased by 10mL compared to 02/25/2014 spirometry   • History of PFTs 02/26/2014    Mild to moderate nonspecific reduction of the FEV1 and FVC with a preserved ratio. The FEV1 is 2.13 liters which is 72% of predicted.While technically nonspecific, cannot rule out restriction.    • History of tobacco abuse     with cessation 20 years ago.   • History of transient ischemic attack     in 1994 (right facial drooping and upper extremity weakness).   • Impaired vision    • Obesity    • Oral thrush 9/15/2016   • Polyp of sigmoid colon    • Type 2 diabetes mellitus (HCC)         PAST SURGICAL HISTORY  Past Surgical  History:   Procedure Laterality Date   • CATARACT EXTRACTION     • CHOLECYSTECTOMY     • CORONARY ARTERY BYPASS GRAFT     • EYE SURGERY     • ROTATOR CUFF REPAIR Right         MEDICATIONS:    Current Outpatient Medications:   •  Accu-Chek Softclix Lancets lancets, Test Three Times daily, Disp: 300 each, Rfl: 2  •  Accu-Chek Softclix Lancets lancets, 1 each by Other route 2 (two) times a day. Dx E11.65, Disp: 60 each, Rfl: 5  •  amLODIPine (NORVASC) 10 MG tablet, TAKE ONE TABLET BY MOUTH DAILY, Disp: 90 tablet, Rfl: 3  •  atorvastatin (LIPITOR) 40 MG tablet, Take 1 tablet by mouth Every Night. Need labs, Disp: 90 tablet, Rfl: 0  •  Blood Glucose Monitoring Suppl (ACCU-CHEK COMPACT CARE KIT) kit, Pt. Is visually impaired - qualifies for this meter, Disp: 1 each, Rfl: 0  •  clopidogrel (PLAVIX) 75 MG tablet, TAKE ONE TABLET BY MOUTH DAILY, Disp: 90 tablet, Rfl: 3  •  colestipol (Micronized Colestipol HCl) 1 g tablet, Take 2 tablets by mouth two times daily, separate 2 hours from other medications., Disp: 120 tablet, Rfl: 2  •  Continuous Blood Gluc  (FreeStyle Jarod 2 [place]) device, 1 each Daily., Disp: 1 each, Rfl: 0  •  Continuous Blood Gluc Sensor (FreeStyle Jarod 2 Sensor) misc, 1 each Every 14 (Fourteen) Days., Disp: 6 each, Rfl: 2  •  Dapagliflozin Propanediol (Farxiga) 10 MG tablet, Take 10 mg by mouth Daily., Disp: 30 tablet, Rfl: 5  •  Dulaglutide (Trulicity) 0.75 MG/0.5ML solution pen-injector, Inject 0.75 mg under the skin into the appropriate area as directed Every 7 (Seven) Days., Disp: 2 mL, Rfl: 5  •  fluticasone (FLONASE) 50 MCG/ACT nasal spray, SPRAY TWO SPRAYS IN EACH NOSTRIL ONCE DAILY, Disp: 1 g, Rfl: 3  •  fluticasone-salmeterol (Advair Diskus) 250-50 MCG/DOSE DISKUS, Inhale 1 puff 2 (Two) Times a Day., Disp: 180 each, Rfl: 3  •  glucose blood (Accu-Chek Compact Test Drum) test strip, Uses 2 times per day. E11.65, Disp: 200 each, Rfl: 3  •  glucose monitor monitoring kit, 1 each 2 (Two)  Times a Day. E11.65-  Accu-chek compact plus glucometer, Disp: 1 each, Rfl: 0  •  indapamide (LOZOL) 1.25 MG tablet, TAKE ONE TABLET BY MOUTH DAILY, Disp: 90 tablet, Rfl: 0  •  Insulin Aspart (NOVOLOG FLEXPEN SC), Inject 42 Units under the skin into the appropriate area as directed 2 (Two) Times a Day., Disp: , Rfl:   •  Insulin Pen Needle (BD Pen Needle Melody U/F) 32G X 4 MM misc, Inject 1 each under the skin into the appropriate area as directed 2 (two) times a day., Disp: 200 each, Rfl: 3  •  lisinopril (PRINIVIL,ZESTRIL) 40 MG tablet, TAKE ONE TABLET BY MOUTH DAILY, Disp: 90 tablet, Rfl: 3  •  montelukast (SINGULAIR) 10 MG tablet, Take 1 tablet by mouth Every Night., Disp: 90 tablet, Rfl: 3  •  nitroglycerin (NITROSTAT) 0.4 MG SL tablet, Place 1 tablet under the tongue Every 5 (Five) Minutes As Needed for Chest Pain., Disp: 25 tablet, Rfl: 11  •  omeprazole (priLOSEC) 40 MG capsule, Take 1 capsule by mouth Daily., Disp: 90 capsule, Rfl: 3  •  terazosin (HYTRIN) 10 MG capsule, TAKE ONE CAPSULE BY MOUTH EVERY NIGHT AT BEDTIME, Disp: 90 capsule, Rfl: 3  •  Ventolin  (90 Base) MCG/ACT inhaler, Inhale 2 puffs Every 4 (Four) Hours As Needed for Wheezing., Disp: 8 g, Rfl: 5  •  guaiFENesin-codeine (ROMILAR-AC) 100-10 MG/5ML syrup, , Disp: , Rfl:   •  Insulin Aspart Prot & Aspart (Insulin Asp Prot & Asp FlexPen) (70-30) 100 UNIT/ML suspension pen-injector, Inject 42 Units under the skin into the appropriate area as directed Daily With Breakfast & Dinner., Disp: 75 mL, Rfl: 1  •  Loratadine (Claritin) 10 MG capsule, Take 1 capsule by mouth Daily., Disp: 90 each, Rfl: 3  •  prednisoLONE acetate (PRED FORTE) 1 % ophthalmic suspension, , Disp: , Rfl:     ALLERGIES  Allergies   Allergen Reactions   • Latex Rash   • Levofloxacin Anaphylaxis   • Sulfa Antibiotics Anaphylaxis   • Beta Adrenergic Blockers Other (See Comments)     Significant bradycardia   • Diovan [Valsartan] Diarrhea   • Entex Lq  "[Phenylephrine-Guaifenesin] Hives       FAMILY HISTORY:  Family History   Problem Relation Age of Onset   • Diabetes Other    • Allergic rhinitis Other    • Arthritis Other    • Asthma Other    • Hyperlipidemia Other    • Skin cancer Other    • Heart disease Other    • Colon polyps Other    • Stroke Other    • Hypertension Other    • Diabetes Father    • Heart failure Father    • Diabetes Sister    • Hypertension Sister    • Heart attack Mother 75       SOCIAL HISTORY  Social History     Socioeconomic History   • Marital status:    Tobacco Use   • Smoking status: Former Smoker     Types: Cigarettes     Quit date: 2016     Years since quittin.9   • Smokeless tobacco: Never Used   Vaping Use   • Vaping Use: Never used   Substance and Sexual Activity   • Alcohol use: No   • Drug use: No   • Sexual activity: Defer     Comment: lives with wife         PHYSICAL EXAM   /60 (BP Location: Left arm, Patient Position: Sitting, Cuff Size: Adult)   Pulse 67   Temp 98.2 °F (36.8 °C) (Temporal)   Ht 170.2 cm (67\")   Wt 86.8 kg (191 lb 6.4 oz)   BMI 29.98 kg/m²   Physical Exam  Constitutional:       Appearance: Normal appearance.   HENT:      Head: Normocephalic and atraumatic.   Pulmonary:      Effort: Pulmonary effort is normal.   Abdominal:      General: There is no distension.      Palpations: Abdomen is soft.   Neurological:      Mental Status: He is alert and oriented to person, place, and time.   Psychiatric:         Mood and Affect: Mood normal.         Thought Content: Thought content normal.           ASSESSMENT / PLAN  1. IBS-D  -Colestid 2 gm daily  2. Eructation  -FODMAP diet    Return if symptoms worsen or fail to improve.    I discussed the patients findings and my recommendations with patient    LOUISA Marquez                    "

## 2022-04-06 RX ORDER — ATORVASTATIN CALCIUM 40 MG/1
TABLET, FILM COATED ORAL
Qty: 90 TABLET | Refills: 1 | Status: SHIPPED | OUTPATIENT
Start: 2022-04-06 | End: 2022-10-14

## 2022-04-19 RX ORDER — MONTELUKAST SODIUM 4 MG/1
TABLET, CHEWABLE ORAL
Qty: 360 TABLET | Refills: 3 | Status: SHIPPED | OUTPATIENT
Start: 2022-04-19 | End: 2022-12-12 | Stop reason: ALTCHOICE

## 2022-05-03 ENCOUNTER — PRIOR AUTHORIZATION (OUTPATIENT)
Dept: ENDOCRINOLOGY | Facility: CLINIC | Age: 77
End: 2022-05-03

## 2022-05-03 NOTE — TELEPHONE ENCOUNTER
Deniedon May 2  No notification sent/No se envi notificacin  Drug  FreeStyle Jarod 2 [place] device  Form  [employer] PA Form

## 2022-05-12 RX ORDER — INDAPAMIDE 1.25 MG/1
TABLET, FILM COATED ORAL
Qty: 90 TABLET | Refills: 0 | Status: SHIPPED | OUTPATIENT
Start: 2022-05-12

## 2022-05-13 RX ORDER — TERAZOSIN 10 MG/1
CAPSULE ORAL
Qty: 90 CAPSULE | Refills: 3 | Status: SHIPPED | OUTPATIENT
Start: 2022-05-13

## 2022-06-06 RX ORDER — CLOPIDOGREL BISULFATE 75 MG/1
TABLET ORAL
Qty: 90 TABLET | Refills: 0 | Status: SHIPPED | OUTPATIENT
Start: 2022-06-06 | End: 2022-09-06

## 2022-06-09 ENCOUNTER — OFFICE VISIT (OUTPATIENT)
Dept: PULMONOLOGY | Facility: CLINIC | Age: 77
End: 2022-06-09

## 2022-06-09 VITALS
HEIGHT: 67 IN | WEIGHT: 184 LBS | SYSTOLIC BLOOD PRESSURE: 122 MMHG | TEMPERATURE: 98.4 F | DIASTOLIC BLOOD PRESSURE: 50 MMHG | BODY MASS INDEX: 28.88 KG/M2 | HEART RATE: 65 BPM | OXYGEN SATURATION: 99 %

## 2022-06-09 DIAGNOSIS — J45.909 ASTHMA, UNSPECIFIED ASTHMA SEVERITY, UNSPECIFIED WHETHER COMPLICATED, UNSPECIFIED WHETHER PERSISTENT: Primary | ICD-10-CM

## 2022-06-09 DIAGNOSIS — J45.20 MILD INTERMITTENT ASTHMA WITHOUT COMPLICATION: ICD-10-CM

## 2022-06-09 DIAGNOSIS — R06.02 SHORTNESS OF BREATH: ICD-10-CM

## 2022-06-09 DIAGNOSIS — K21.9 GASTROESOPHAGEAL REFLUX DISEASE, UNSPECIFIED WHETHER ESOPHAGITIS PRESENT: ICD-10-CM

## 2022-06-09 DIAGNOSIS — R53.82 CHRONIC FATIGUE, UNSPECIFIED: ICD-10-CM

## 2022-06-09 PROCEDURE — 99214 OFFICE O/P EST MOD 30 MIN: CPT | Performed by: NURSE PRACTITIONER

## 2022-06-09 PROCEDURE — 36415 COLL VENOUS BLD VENIPUNCTURE: CPT | Performed by: NURSE PRACTITIONER

## 2022-06-09 RX ORDER — ALBUTEROL SULFATE 90 UG/1
2 AEROSOL, METERED RESPIRATORY (INHALATION) EVERY 4 HOURS PRN
Qty: 8 G | Refills: 5 | Status: SHIPPED | OUTPATIENT
Start: 2022-06-09

## 2022-06-10 PROBLEM — R06.02 SHORTNESS OF BREATH: Status: ACTIVE | Noted: 2022-06-10

## 2022-06-10 LAB
25(OH)D3+25(OH)D2 SERPL-MCNC: 22.2 NG/ML (ref 30–100)
ALBUMIN SERPL-MCNC: 4.2 G/DL (ref 3.7–4.7)
ALBUMIN/GLOB SERPL: 1.4 {RATIO} (ref 1.2–2.2)
ALP SERPL-CCNC: 104 IU/L (ref 44–121)
ALT SERPL-CCNC: 28 IU/L (ref 0–44)
AST SERPL-CCNC: 24 IU/L (ref 0–40)
BASOPHILS # BLD AUTO: 0.1 X10E3/UL (ref 0–0.2)
BASOPHILS NFR BLD AUTO: 1 %
BILIRUB SERPL-MCNC: 0.5 MG/DL (ref 0–1.2)
BUN SERPL-MCNC: 29 MG/DL (ref 8–27)
BUN/CREAT SERPL: 19 (ref 10–24)
CALCIUM SERPL-MCNC: 8.9 MG/DL (ref 8.6–10.2)
CHLORIDE SERPL-SCNC: 106 MMOL/L (ref 96–106)
CO2 SERPL-SCNC: 17 MMOL/L (ref 20–29)
CREAT SERPL-MCNC: 1.56 MG/DL (ref 0.76–1.27)
EGFRCR SERPLBLD CKD-EPI 2021: 46 ML/MIN/1.73
EOSINOPHIL # BLD AUTO: 0.3 X10E3/UL (ref 0–0.4)
EOSINOPHIL NFR BLD AUTO: 3 %
ERYTHROCYTE [DISTWIDTH] IN BLOOD BY AUTOMATED COUNT: 12.9 % (ref 11.6–15.4)
GLOBULIN SER CALC-MCNC: 3 G/DL (ref 1.5–4.5)
GLUCOSE SERPL-MCNC: 153 MG/DL (ref 65–99)
HCT VFR BLD AUTO: 38.6 % (ref 37.5–51)
HGB BLD-MCNC: 12.9 G/DL (ref 13–17.7)
IMM GRANULOCYTES # BLD AUTO: 0 X10E3/UL (ref 0–0.1)
IMM GRANULOCYTES NFR BLD AUTO: 0 %
LYMPHOCYTES # BLD AUTO: 2.1 X10E3/UL (ref 0.7–3.1)
LYMPHOCYTES NFR BLD AUTO: 20 %
MCH RBC QN AUTO: 30 PG (ref 26.6–33)
MCHC RBC AUTO-ENTMCNC: 33.4 G/DL (ref 31.5–35.7)
MCV RBC AUTO: 90 FL (ref 79–97)
MONOCYTES # BLD AUTO: 0.8 X10E3/UL (ref 0.1–0.9)
MONOCYTES NFR BLD AUTO: 8 %
NEUTROPHILS # BLD AUTO: 7.3 X10E3/UL (ref 1.4–7)
NEUTROPHILS NFR BLD AUTO: 68 %
PLATELET # BLD AUTO: 279 X10E3/UL (ref 150–450)
POTASSIUM SERPL-SCNC: 4.6 MMOL/L (ref 3.5–5.2)
PROT SERPL-MCNC: 7.2 G/DL (ref 6–8.5)
RBC # BLD AUTO: 4.3 X10E6/UL (ref 4.14–5.8)
SODIUM SERPL-SCNC: 139 MMOL/L (ref 134–144)
TSH SERPL DL<=0.005 MIU/L-ACNC: 2.62 UIU/ML (ref 0.45–4.5)
VIT B12 SERPL-MCNC: 415 PG/ML (ref 232–1245)
WBC # BLD AUTO: 10.6 X10E3/UL (ref 3.4–10.8)

## 2022-06-10 NOTE — PROGRESS NOTES
[facility], [place] Pulmonary Follow up    CHIEF COMPLAINT    dyspnea    HISTORY OF PRESENT ILLNESS    Rashad Mendez is a 77 y.o.male here today for follow-up.  He was last seen in the office by me in December.  He denies any rest or illnesses or exacerbations since his last appointment.  I had recommended full PFTs at this appointment but unfortunately did not have his COVID test and was unable to perform his PFTs.    He continues to use his Advair twice a day.  He also uses albuterol as needed for shortness of breath.    He has noticed more shortness of breath since his last appointment.  He states that with any activity he does get short of breath and has to take breaks to recover.    He does continue to follow closely with cardiology.  He has a follow-up scheduled later this summer with him.  He continues to wear his CPAP every night for EVA.  He averages 6 to 8 hours at night.  He denies any sleeping difficulties.    He denies fever, chills, sputum production, hemoptysis, night sweats, weight loss, chest pain or palpitations.  He denies any lower extremity edema or calf tenderness.  He does have chronic sinus and allergy symptoms and takes Flonase, Claritin and Singulair daily.  He continues to have difficulty with his stomach and is followed with GI but no interventions were warranted.  He does have nausea on a daily basis.  He does take Prilosec regularly.    He is up-to-date on his current vaccinations.  He is companied by his wife.  Patient Active Problem List   Diagnosis   • Coronary artery disease   • Cerebrovascular disease   • Diabetic retinopathy (HCC)   • Gastroesophageal reflux disease   • Hypertension   • Adiposity   • Obstructive sleep apnea syndrome   • Peripheral vascular disease (HCC)   • Acute cerebral infarction (HCC)   • Allergic rhinitis   • Asthma   • Blurry vision   • Carpal tunnel syndrome   • Diverticulosis   • Dyslipidemia   • Edema   • Irritable bowel syndrome   • Long term current use of insulin  ([facility])   • Oral thrush   • Paresthesia   • Restrictive lung disease   • Type 2 diabetes mellitus with hyperglycemia, with long-term current use of insulin ([facility])   • Diverticulitis   • Adenomatous polyp of sigmoid colon   • Stage 3a chronic kidney disease ([facility])       Allergies   Allergen Reactions   • Latex Rash   • Levofloxacin Anaphylaxis   • Sulfa Antibiotics Anaphylaxis   • Beta Adrenergic Blockers Other (See Comments)     Significant bradycardia   • Diovan [Valsartan] Diarrhea   • Entex Lq [Phenylephrine-Guaifenesin] Hives       Current Outpatient Medications:   •  Accu-Chek Softclix Lancets lancets, Test Three Times daily, Disp: 300 each, Rfl: 2  •  Accu-Chek Softclix Lancets lancets, 1 each by Other route 2 (two) times a day. Dx E11.65, Disp: 60 each, Rfl: 5  •  amLODIPine (NORVASC) 10 MG tablet, TAKE ONE TABLET BY MOUTH DAILY, Disp: 90 tablet, Rfl: 3  •  atorvastatin (LIPITOR) 40 MG tablet, TAKE ONE TABLET BY MOUTH EVERY NIGHT AT BEDTIME, NEED LABS, Disp: 90 tablet, Rfl: 1  •  Blood Glucose Monitoring Suppl (ACCU-CHEK COMPACT CARE KIT) kit, Pt. Is visually impaired - qualifies for this meter, Disp: 1 each, Rfl: 0  •  clopidogrel (PLAVIX) 75 MG tablet, TAKE ONE TABLET BY MOUTH DAILY, Disp: 90 tablet, Rfl: 0  •  colestipol (Micronized Colestipol HCl) 1 g tablet, Take 2 tablets by mouth two times daily, separate 2 hours from other medications., Disp: 360 tablet, Rfl: 3  •  Continuous Blood Gluc  (FreeStyle Jarod 2 [place]) device, 1 each Daily., Disp: 1 each, Rfl: 0  •  Continuous Blood Gluc Sensor (FreeStyle Jarod 2 Sensor) misc, 1 each Every 14 (Fourteen) Days., Disp: 6 each, Rfl: 2  •  Dapagliflozin Propanediol (Farxiga) 10 MG tablet, Take 10 mg by mouth Daily., Disp: 30 tablet, Rfl: 5  •  Dulaglutide (Trulicity) 0.75 MG/0.5ML solution pen-injector, Inject 0.75 mg under the skin into the appropriate area as directed Every 7 (Seven) Days., Disp: 2 mL, Rfl: 5  •  fluticasone (FLONASE) 50 MCG/ACT nasal  spray, SPRAY TWO SPRAYS IN EACH NOSTRIL ONCE DAILY, Disp: 1 g, Rfl: 3  •  fluticasone-salmeterol (Advair Diskus) 250-50 MCG/DOSE DISKUS, Inhale 1 puff 2 (Two) Times a Day., Disp: 180 each, Rfl: 3  •  glucose blood (Accu-Chek Compact Test Drum) test strip, Uses 2 times per day. E11.65, Disp: 200 each, Rfl: 3  •  glucose monitor monitoring kit, 1 each 2 (Two) Times a Day. E11.65-  Accu-chek compact plus glucometer, Disp: 1 each, Rfl: 0  •  guaiFENesin-codeine (ROMILAR-AC) 100-10 MG/5ML syrup, , Disp: , Rfl:   •  indapamide (LOZOL) 1.25 MG tablet, TAKE ONE TABLET BY MOUTH DAILY, Disp: 90 tablet, Rfl: 0  •  Insulin Aspart (NOVOLOG FLEXPEN SC), Inject 42 Units under the skin into the appropriate area as directed 2 (Two) Times a Day., Disp: , Rfl:   •  Insulin Aspart Prot & Aspart (Insulin Asp Prot & Asp FlexPen) (70-30) 100 UNIT/ML suspension pen-injector, Inject 42 Units under the skin into the appropriate area as directed Daily With Breakfast & Dinner., Disp: 75 mL, Rfl: 1  •  Insulin Pen Needle (BD Pen Needle Melody U/F) 32G X 4 MM misc, Inject 1 each under the skin into the appropriate area as directed 2 (two) times a day., Disp: 200 each, Rfl: 3  •  lisinopril (PRINIVIL,ZESTRIL) 40 MG tablet, TAKE ONE TABLET BY MOUTH DAILY, Disp: 90 tablet, Rfl: 3  •  Loratadine (Claritin) 10 MG capsule, Take 1 capsule by mouth Daily., Disp: 90 each, Rfl: 3  •  montelukast (SINGULAIR) 10 MG tablet, Take 1 tablet by mouth Every Night., Disp: 90 tablet, Rfl: 3  •  nitroglycerin (NITROSTAT) 0.4 MG SL tablet, Place 1 tablet under the tongue Every 5 (Five) Minutes As Needed for Chest Pain., Disp: 25 tablet, Rfl: 11  •  omeprazole (priLOSEC) 40 MG capsule, Take 1 capsule by mouth Daily., Disp: 90 capsule, Rfl: 3  •  prednisoLONE acetate (PRED FORTE) 1 % ophthalmic suspension, , Disp: , Rfl:   •  terazosin (HYTRIN) 10 MG capsule, TAKE ONE CAPSULE BY MOUTH EVERY NIGHT AT BEDTIME, Disp: 90 capsule, Rfl: 3  •  Ventolin  (90 Base) MCG/ACT  "inhaler, Inhale 2 puffs Every 4 (Four) Hours As Needed for Wheezing., Disp: 8 g, Rfl: 5  MEDICATION LIST AND ALLERGIES REVIEWED.    Social History     Tobacco Use   • Smoking status: Former Smoker     Types: Cigarettes     Quit date: 2016     Years since quittin.1   • Smokeless tobacco: Never Used   Vaping Use   • Vaping Use: Never used   Substance Use Topics   • Alcohol use: No   • Drug use: No       FAMILY AND SOCIAL HISTORY REVIEWED.    Review of Systems   Constitutional: Positive for fatigue. Negative for activity change, appetite change, fever and unexpected weight change.   HENT: Negative for congestion, postnasal drip, rhinorrhea, sinus pressure, sore throat and voice change.    Eyes: Negative for visual disturbance.   Respiratory: Positive for shortness of breath. Negative for cough, chest tightness and wheezing.    Cardiovascular: Negative for chest pain, palpitations and leg swelling.   Gastrointestinal: Negative for abdominal distention, abdominal pain, nausea and vomiting.   Endocrine: Negative for cold intolerance and heat intolerance.   Genitourinary: Negative for difficulty urinating and urgency.   Musculoskeletal: Negative for arthralgias, back pain and neck pain.   Skin: Negative for color change and pallor.   Allergic/Immunologic: Negative for environmental allergies and food allergies.   Neurological: Negative for dizziness, syncope, weakness and light-headedness.   Hematological: Negative for adenopathy. Does not bruise/bleed easily.   Psychiatric/Behavioral: Negative for agitation and behavioral problems.   .    /50 (BP Location: Right arm, Patient Position: Sitting, Cuff Size: Adult)   Pulse 65   Temp 98.4 °F (36.9 °C) (Infrared)   Ht 170.2 cm (67\")   Wt 83.5 kg (184 lb)   SpO2 99% Comment: resting, room air  BMI 28.82 kg/m²     Immunization History   Administered Date(s) Administered   • COVID-19 (MODERNA) 1st, 2nd, 3rd Dose Only 2021, 2021, 2022   • " Fluzone High Dose =>65 Years ([employer] ONLY) 11/06/2017, 10/29/2018   • Influenza, Unspecified 10/03/2019, 10/01/2020   • Pneumococcal Conjugate 13-Valent (PCV13) 11/06/2017   • Td 03/19/1997   • influenza Split 12/07/2016       Physical Exam  Vitals and nursing note reviewed.   Constitutional:       Appearance: He is well-developed. He is not diaphoretic.   HENT:      Head: Normocephalic and atraumatic.   Eyes:      Pupils: Pupils are equal, round, and reactive to light.   Neck:      Thyroid: No thyromegaly.   Cardiovascular:      Rate and Rhythm: Normal rate and regular rhythm.      Heart sounds: Normal heart sounds. No murmur heard.    No friction rub. No gallop.   Pulmonary:      Effort: Pulmonary effort is normal. No respiratory distress.      Breath sounds: Normal breath sounds. No wheezing or rales.   Chest:      Chest wall: No tenderness.   Abdominal:      General: Bowel sounds are normal.      Palpations: Abdomen is soft.      Tenderness: There is no abdominal tenderness.   Musculoskeletal:         General: No swelling. Normal range of motion.      Cervical back: Normal range of motion and neck supple.   Lymphadenopathy:      Cervical: No cervical adenopathy.   Skin:     General: Skin is warm and dry.      Capillary Refill: Capillary refill takes less than 2 seconds.   Neurological:      Mental Status: He is alert and oriented to person, place, and time.   Psychiatric:         Mood and Affect: Mood normal.         Behavior: Behavior normal.           RESULTS    XR Chest PA & Lateral    Result Date: 6/9/2022  Mild chronic interstitial changes of the lung fields without evidence of acute cardiopulmonary abnormality.  This report was finalized on 6/9/2022 9:53 AM by Savage Hernandez.      PROBLEM LIST    Problem List Items Addressed This Visit        Gastrointestinal Abdominal     Gastroesophageal reflux disease       Pulmonary and Pneumonias    Asthma - Primary    Relevant Medications    Ventolin  (90  Base) MCG/ACT inhaler    Other Relevant Orders    XR Chest PA & Lateral (Completed)      Other Visit Diagnoses     Shortness of breath        Relevant Orders    CBC & Differential    Comprehensive Metabolic Panel    Vitamin B12    Vitamin D 25 Hydroxy    TSH    Adult Transthoracic Echo Complete w/ Color, Spectral and Contrast if necessary per protocol    Body mass index (BMI) 30.0-30.9, adult         Relevant Orders    Vitamin D 25 Hydroxy    Chronic fatigue, unspecified         Relevant Orders    TSH            DISCUSSION    Mr. Mendez was here for follow-up of his dyspnea.  He has noticed more shortness of breath over the last 6 months.  I would like to have full PFTs performed to rule out any abnormal lung function but were unable to complete these today.  We will schedule these at his next appointment.    We did review his chest x-ray in the office today that shows some mild chronic interstitial changes and this could be related to his post-COVID last fall.    He will continue his Advair twice a day.  I did encourage him to continue using his albuterol as needed for shortness of breath.    We will draw some labs to rule out any other causes of his dyspnea and fatigue.  I will call him with any abnormal labs.    We will also obtain an echocardiogram to rule out any cardiac dysfunction causing his dyspnea.    I will call him once I receive the results of his labs and echocardiogram.  He will follow-up in a couple of months with full PFTs or sooner if his symptoms worsen.  I did advise him to call with any additional concerns or questions.    Level of service justified based on 35 minutes spent in patient care on this date of service including, but not limited to: preparing to see the patient, obtaining and/or reviewing history, performing medically appropriate examination, ordering tests/medicine/procedures, independently interpreting results, documenting clinical information in EHR, and counseling/education of  patient/family/caregiver. (Level 4 30-39 minutes; Level 5 40-54 minutes)      Gilda MARTINA Ma, LOUISA  06/09/202209:45 EDT  Electronically signed     Please note that portions of this note were completed with a voice recognition program.        CC: Sampson Torres MD

## 2022-06-27 ENCOUNTER — HOSPITAL ENCOUNTER (OUTPATIENT)
Dept: CARDIOLOGY | Facility: HOSPITAL | Age: 77
Discharge: HOME | End: 2022-06-27
Admitting: NURSE PRACTITIONER

## 2022-06-27 VITALS
DIASTOLIC BLOOD PRESSURE: 60 MMHG | BODY MASS INDEX: 27.73 KG/M2 | HEIGHT: 68 IN | SYSTOLIC BLOOD PRESSURE: 148 MMHG | WEIGHT: 182.98 LBS

## 2022-06-27 DIAGNOSIS — R06.02 SHORTNESS OF BREATH: ICD-10-CM

## 2022-06-27 LAB
BH CV ECHO MEAS - AO MAX PG: 8.4 MMHG
BH CV ECHO MEAS - AO MEAN PG: 4.5 MMHG
BH CV ECHO MEAS - AO ROOT DIAM: 3.2 CM
BH CV ECHO MEAS - AO V2 MAX: 145.2 CM/SEC
BH CV ECHO MEAS - AO V2 VTI: 34.3 CM
BH CV ECHO MEAS - AVA(I,D): 3 CM2
BH CV ECHO MEAS - EDV(CUBED): 91.3 ML
BH CV ECHO MEAS - EDV(MOD-SP2): 61 ML
BH CV ECHO MEAS - EDV(MOD-SP4): 67 ML
BH CV ECHO MEAS - EF(MOD-BP): 74 %
BH CV ECHO MEAS - EF(MOD-SP2): 72.1 %
BH CV ECHO MEAS - EF(MOD-SP4): 76.1 %
BH CV ECHO MEAS - ESV(CUBED): 21 ML
BH CV ECHO MEAS - ESV(MOD-SP2): 17 ML
BH CV ECHO MEAS - ESV(MOD-SP4): 16 ML
BH CV ECHO MEAS - FS: 38.7 %
BH CV ECHO MEAS - IVS/LVPW: 0.86 CM
BH CV ECHO MEAS - IVSD: 0.83 CM
BH CV ECHO MEAS - LA DIMENSION: 4.3 CM
BH CV ECHO MEAS - LV DIASTOLIC VOL/BSA (35-75): 34 CM2
BH CV ECHO MEAS - LV MASS(C)D: 133.2 GRAMS
BH CV ECHO MEAS - LV MAX PG: 7.8 MMHG
BH CV ECHO MEAS - LV MEAN PG: 4.1 MMHG
BH CV ECHO MEAS - LV SYSTOLIC VOL/BSA (12-30): 8.1 CM2
BH CV ECHO MEAS - LV V1 MAX: 140.1 CM/SEC
BH CV ECHO MEAS - LV V1 VTI: 33.9 CM
BH CV ECHO MEAS - LVIDD: 4.5 CM
BH CV ECHO MEAS - LVIDS: 2.8 CM
BH CV ECHO MEAS - LVOT AREA: 3.1 CM2
BH CV ECHO MEAS - LVOT DIAM: 1.98 CM
BH CV ECHO MEAS - LVPWD: 0.97 CM
BH CV ECHO MEAS - MV A MAX VEL: 116.5 CM/SEC
BH CV ECHO MEAS - MV DEC SLOPE: 528.3 CM/SEC2
BH CV ECHO MEAS - MV DEC TIME: 0.33 MSEC
BH CV ECHO MEAS - MV E MAX VEL: 101.7 CM/SEC
BH CV ECHO MEAS - MV E/A: 0.87
BH CV ECHO MEAS - MV P1/2T: 82 MSEC
BH CV ECHO MEAS - MVA(P1/2T): 2.7 CM2
BH CV ECHO MEAS - PA ACC SLOPE: 997.8 CM/SEC2
BH CV ECHO MEAS - PA ACC TIME: 0.12 SEC
BH CV ECHO MEAS - PA PR(ACCEL): 25.1 MMHG
BH CV ECHO MEAS - RAP SYSTOLE: 3 MMHG
BH CV ECHO MEAS - RVSP: 17.9 MMHG
BH CV ECHO MEAS - SI(MOD-SP2): 22.4 ML/M2
BH CV ECHO MEAS - SI(MOD-SP4): 25.9 ML/M2
BH CV ECHO MEAS - SV(LVOT): 104 ML
BH CV ECHO MEAS - SV(MOD-SP2): 44 ML
BH CV ECHO MEAS - SV(MOD-SP4): 51 ML
BH CV ECHO MEAS - TAPSE (>1.6): 1.4 CM
BH CV ECHO MEAS - TR MAX PG: 14.9 MMHG
BH CV ECHO MEAS - TR MAX VEL: 192.7 CM/SEC
BH CV XLRA - RV BASE: 3.9 CM
BH CV XLRA - RV LENGTH: 5.3 CM
BH CV XLRA - RV MID: 3.5 CM
LEFT ATRIUM VOLUME INDEX: 25.9 ML/M2
LV EF 2D ECHO EST: 75 %
MAXIMAL PREDICTED HEART RATE: 144 BPM
STRESS TARGET HR: 122 BPM

## 2022-06-27 PROCEDURE — 93306 TTE W/DOPPLER COMPLETE: CPT | Performed by: INTERNAL MEDICINE

## 2022-06-27 PROCEDURE — 93306 TTE W/DOPPLER COMPLETE: CPT

## 2022-07-05 NOTE — PROGRESS NOTES
[facility] Cardiology    Patient ID: Rashad Mendez is a 77 y.o. male.  : 1945   Contact: [phone]    Encounter date: 2022    PCP: Sampson Torres MD      Chief complaint:   Chief Complaint   Patient presents with   • Coronary artery disease involving native coronary artery of       Problem List:  1. Coronary artery disease:  a. History of extreme fatigue, 2008.  b. [facility], 2008: S/p Liberté 3 x 12 mm stent to the OM1. Normal EF.  c. Cardiolite, 10/29/2012: Positive stress Cardiolite for inducible ischemia in the inferior myocardial segment.  d. LHC, 2012: Normal EF. Occluded RCA with left-to-right collaterals. 70-80% ostial LM stenosis. Normal EF.  e. CABG, 2012, Dr. Byers: SVG to LCx, SVG to PDA, LIMA to LAD.  f. MPS, 2018: No evidence of inducible ischemia.  g. MPS, 2021; No evidence of ischemia. Low risk study.   2. Carotid artery disease   a. Carotid duplex, 2018; 0-49% bilateral carotid artery stenosis. Mild bilateral carotid atherosclerosis.   3. Dyspnea  a. Echocardiogram, 2022; EF 75%. Trace MR and TR.   4. Cerebrovascular disease  a. History of transient ischemic attack in  (right facial drooping and upper extremity weakness).  b. Acute lacunar infarct, 2014, felt secondary to accelerated hypertension:  c. 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  d. Subsequent admission, 2014, for acute on subacute ischemic stroke (right thalamic) with the addition of Plavix.  5. Hypertension:  a. Bradycardia, on beta-blockers. Coreg discontinued, 2014.  6. Hyperlipidemia.  7. Type 2 diabetes mellitus.  8. Gastroesophageal reflux disease.  9. Diverticulitis.  10. Irritable bowel syndrome.  11. Obstructive sleep apnea with CPAP usage at bedtime.  12. Asthma.  13. History of tobacco abuse with cessation 20 years ago.  14. Obesity.  15. Surgical history:  a. Status post  cholecystectomy.  b. Status post right rotator cuff repair    Allergies   Allergen Reactions   • Latex Rash   • Levofloxacin Anaphylaxis   • Sulfa Antibiotics Anaphylaxis   • Beta Adrenergic Blockers Other (See Comments)     Significant bradycardia   • Diovan [Valsartan] Diarrhea   • Entex Lq [Phenylephrine-Guaifenesin] Hives       Current Medications:    Current Outpatient Medications:   •  Accu-Chek Softclix Lancets lancets, Test Three Times daily, Disp: 300 each, Rfl: 2  •  Accu-Chek Softclix Lancets lancets, 1 each by Other route 2 (two) times a day. Dx E11.65, Disp: 60 each, Rfl: 5  •  amLODIPine (NORVASC) 10 MG tablet, TAKE ONE TABLET BY MOUTH DAILY, Disp: 90 tablet, Rfl: 3  •  atorvastatin (LIPITOR) 40 MG tablet, TAKE ONE TABLET BY MOUTH EVERY NIGHT AT BEDTIME, NEED LABS, Disp: 90 tablet, Rfl: 1  •  Blood Glucose Monitoring Suppl (ACCU-CHEK COMPACT CARE KIT) kit, Pt. Is visually impaired - qualifies for this meter, Disp: 1 each, Rfl: 0  •  clopidogrel (PLAVIX) 75 MG tablet, TAKE ONE TABLET BY MOUTH DAILY, Disp: 90 tablet, Rfl: 0  •  colestipol (Micronized Colestipol HCl) 1 g tablet, Take 2 tablets by mouth two times daily, separate 2 hours from other medications., Disp: 360 tablet, Rfl: 3  •  Dapagliflozin Propanediol (Farxiga) 10 MG tablet, Take 10 mg by mouth Daily., Disp: 30 tablet, Rfl: 5  •  fluticasone (FLONASE) 50 MCG/ACT nasal spray, SPRAY TWO SPRAYS IN EACH NOSTRIL ONCE DAILY, Disp: 1 g, Rfl: 3  •  fluticasone-salmeterol (Advair Diskus) 250-50 MCG/DOSE DISKUS, Inhale 1 puff 2 (Two) Times a Day., Disp: 180 each, Rfl: 3  •  glucose blood (Accu-Chek Compact Test Drum) test strip, Uses 2 times per day. E11.65, Disp: 200 each, Rfl: 3  •  glucose monitor monitoring kit, 1 each 2 (Two) Times a Day. E11.65-  Accu-chek compact plus glucometer, Disp: 1 each, Rfl: 0  •  guaiFENesin-codeine (ROMILAR-AC) 100-10 MG/5ML syrup, , Disp: , Rfl:   •  indapamide (LOZOL) 1.25 MG tablet, TAKE ONE TABLET BY MOUTH DAILY,  Disp: 90 tablet, Rfl: 0  •  Insulin Aspart (NOVOLOG FLEXPEN SC), Inject 42 Units under the skin into the appropriate area as directed 2 (Two) Times a Day., Disp: , Rfl:   •  Insulin Pen Needle (BD Pen Needle Melody U/F) 32G X 4 MM misc, Inject 1 each under the skin into the appropriate area as directed 2 (two) times a day., Disp: 200 each, Rfl: 3  •  lisinopril (PRINIVIL,ZESTRIL) 40 MG tablet, TAKE ONE TABLET BY MOUTH DAILY, Disp: 90 tablet, Rfl: 3  •  Loratadine (Claritin) 10 MG capsule, Take 1 capsule by mouth Daily., Disp: 90 each, Rfl: 3  •  montelukast (SINGULAIR) 10 MG tablet, Take 1 tablet by mouth Every Night., Disp: 90 tablet, Rfl: 3  •  nitroglycerin (NITROSTAT) 0.4 MG SL tablet, Place 1 tablet under the tongue Every 5 (Five) Minutes As Needed for Chest Pain., Disp: 25 tablet, Rfl: 11  •  omeprazole (priLOSEC) 40 MG capsule, Take 1 capsule by mouth Daily., Disp: 90 capsule, Rfl: 3  •  terazosin (HYTRIN) 10 MG capsule, TAKE ONE CAPSULE BY MOUTH EVERY NIGHT AT BEDTIME, Disp: 90 capsule, Rfl: 3  •  Ventolin  (90 Base) MCG/ACT inhaler, Inhale 2 puffs Every 4 (Four) Hours As Needed for Wheezing., Disp: 8 g, Rfl: 5  •  Continuous Blood Gluc  (FreeStyle Jarod 2 [place]) device, 1 each Daily., Disp: 1 each, Rfl: 0  •  Continuous Blood Gluc Sensor (FreeStyle Jarod 2 Sensor) misc, 1 each Every 14 (Fourteen) Days., Disp: 6 each, Rfl: 2  •  Diclofenac Sodium (VOLTAREN) 1 % gel gel, Apply 1 g topically to the appropriate area as directed As Needed., Disp: , Rfl:   •  Dulaglutide (Trulicity) 0.75 MG/0.5ML solution pen-injector, Inject 0.75 mg under the skin into the appropriate area as directed Every 7 (Seven) Days., Disp: 2 mL, Rfl: 5  •  Insulin Aspart Prot & Aspart (Insulin Asp Prot & Asp FlexPen) (70-30) 100 UNIT/ML suspension pen-injector, Inject 42 Units under the skin into the appropriate area as directed Daily With Breakfast & Dinner., Disp: 75 mL, Rfl: 1  •  prednisoLONE acetate (PRED FORTE) 1 %  "ophthalmic suspension, , Disp: , Rfl:     HPI    Rashad Mendez is a 77 y.o. male who presents today for a follow up of coronary artery disease and cardiac risk factors. Since last visit, patient states that he occasionally gets some chest pain. He also has been experiencing some dizziness and states his heart rate has been low about two to three times a month. He does check his blood pressure when he is dizzy and it is okay. He states he seen pulmonology recently and was told he had congestion. He also follows up with his endocrinologist regularly and does not believe they regularly check his lipid levels. Patient denies chest pain, shortness of breath, orthopnea, palpitations, edema, and syncope.        The following portions of the patient's history were reviewed and updated as appropriate: allergies, current medications and problem list.    Pertinent positives as listed in the HPI.  All other systems reviewed are negative.         Vitals:    07/06/22 1030   BP: 138/58   BP Location: Left arm   Patient Position: Sitting   Cuff Size: Adult   Pulse: 59   SpO2: 97%   Weight: 84.8 kg (187 lb)   Height: 171.5 cm (67.5\")       Physical Exam:  General: Alert and oriented.  Neck: Jugular venous pressure is within normal limits. Carotids have normal upstrokes without bruits.   Cardiovascular: Heart has a nondisplaced focal PMI. Regular rate and rhythm. No murmur, gallop or rub.  Lungs: Clear, no rales or wheezes. Equal expansion is noted.   Extremities: Show no edema.  Skin: Warm and dry.  Neurologic: Nonfocal.     Diagnostic Data (reviewed with patient):  Lab Results   Component Value Date    GLUCOSE 153 (H) 06/09/2022    BUN 29 (H) 06/09/2022    CREATININE 1.56 (H) 06/09/2022    BCR 19 06/09/2022     06/09/2022    K 4.6 06/09/2022     06/09/2022    CO2 17 (L) 06/09/2022    CALCIUM 8.9 06/09/2022    ALBUMIN 4.2 06/09/2022    ALKPHOS 104 06/09/2022    AST 24 06/09/2022    ALT 28 06/09/2022     Lab Results "   Component Value Date    CHLPL 115 03/31/2021    TRIG 257 (H) 03/31/2021    HDL 31 (L) 03/31/2021    LDL 44 03/31/2021      Lab Results   Component Value Date    WBC 10.6 06/09/2022    RBC 4.30 06/09/2022    HGB 12.9 (L) 06/09/2022    HCT 38.6 06/09/2022    MCV 90 06/09/2022     06/09/2022      Lab Results   Component Value Date    TSH 2.620 06/09/2022        Procedures      Assessment:    ICD-10-CM ICD-9-CM   1. Coronary artery disease involving native coronary artery of native heart without angina pectoris  I25.10 414.01   2. Essential hypertension  I10 401.9   3. Dyslipidemia  E78.5 272.4         Plan:  1. Continue routinely monitoring blood pressure at home with a goal for systolic to be <140 mmHg and simultaneously monitor heart rate. If his heart rate is low several times a month, call us and we will send a heart monitor to further assess.   2. Routine FLP and CMP ordered to reassess lipid levels.   3. Continue all other current medications.  4. F/up in 12 months, sooner if needed.    Scribed for Ofelia Sexton MD by Rajani Biswas. 7/6/2022 11:18 EDT         I Ofelia Sexton MD personally performed the services described in this documentation as scribed by the above individual in my presence, and it is both accurate and complete.    Ofelia Sexton MD, [facility]

## 2022-07-06 ENCOUNTER — LAB (OUTPATIENT)
Dept: LAB | Facility: HOSPITAL | Age: 77
End: 2022-07-06

## 2022-07-06 ENCOUNTER — OFFICE VISIT (OUTPATIENT)
Dept: CARDIOLOGY | Facility: CLINIC | Age: 77
End: 2022-07-06

## 2022-07-06 VITALS
BODY MASS INDEX: 28.34 KG/M2 | DIASTOLIC BLOOD PRESSURE: 58 MMHG | OXYGEN SATURATION: 97 % | SYSTOLIC BLOOD PRESSURE: 138 MMHG | HEART RATE: 59 BPM | WEIGHT: 187 LBS | HEIGHT: 68 IN

## 2022-07-06 DIAGNOSIS — E78.5 DYSLIPIDEMIA: ICD-10-CM

## 2022-07-06 DIAGNOSIS — I25.10 CORONARY ARTERY DISEASE INVOLVING NATIVE CORONARY ARTERY OF NATIVE HEART WITHOUT ANGINA PECTORIS: Primary | ICD-10-CM

## 2022-07-06 DIAGNOSIS — I10 ESSENTIAL HYPERTENSION: ICD-10-CM

## 2022-07-06 LAB
ALBUMIN SERPL-MCNC: 3.9 G/DL (ref 3.5–5.2)
ALBUMIN/GLOB SERPL: 1.3 G/DL
ALP SERPL-CCNC: 78 U/L (ref 39–117)
ALT SERPL W P-5'-P-CCNC: 21 U/L (ref 1–41)
ANION GAP SERPL CALCULATED.3IONS-SCNC: 13.5 MMOL/L (ref 5–15)
AST SERPL-CCNC: 20 U/L (ref 1–40)
BILIRUB SERPL-MCNC: 0.6 MG/DL (ref 0–1.2)
BUN SERPL-MCNC: 22 MG/DL (ref 8–23)
BUN/CREAT SERPL: 13.7 (ref 7–25)
CALCIUM SPEC-SCNC: 9.4 MG/DL (ref 8.6–10.5)
CHLORIDE SERPL-SCNC: 104 MMOL/L (ref 98–107)
CHOLEST SERPL-MCNC: 141 MG/DL (ref 0–200)
CO2 SERPL-SCNC: 21.5 MMOL/L (ref 22–29)
CREAT SERPL-MCNC: 1.61 MG/DL (ref 0.76–1.27)
EGFRCR SERPLBLD CKD-EPI 2021: 44 ML/MIN/1.73
GLOBULIN UR ELPH-MCNC: 2.9 GM/DL
GLUCOSE SERPL-MCNC: 144 MG/DL (ref 65–99)
HDLC SERPL-MCNC: 34 MG/DL (ref 40–60)
LDLC SERPL CALC-MCNC: 60 MG/DL (ref 0–100)
LDLC/HDLC SERPL: 1.39 {RATIO}
POTASSIUM SERPL-SCNC: 4.3 MMOL/L (ref 3.5–5.2)
PROT SERPL-MCNC: 6.8 G/DL (ref 6–8.5)
SODIUM SERPL-SCNC: 139 MMOL/L (ref 136–145)
TRIGL SERPL-MCNC: 298 MG/DL (ref 0–150)
VLDLC SERPL-MCNC: 47 MG/DL (ref 5–40)

## 2022-07-06 PROCEDURE — 80061 LIPID PANEL: CPT

## 2022-07-06 PROCEDURE — 99214 OFFICE O/P EST MOD 30 MIN: CPT | Performed by: INTERNAL MEDICINE

## 2022-07-06 PROCEDURE — 80053 COMPREHEN METABOLIC PANEL: CPT

## 2022-07-06 PROCEDURE — 36415 COLL VENOUS BLD VENIPUNCTURE: CPT

## 2022-07-06 RX ORDER — NITROGLYCERIN 0.4 MG/1
0.4 TABLET SUBLINGUAL
Qty: 25 TABLET | Refills: 11 | Status: SHIPPED | OUTPATIENT
Start: 2022-07-06

## 2022-07-08 ENCOUNTER — TELEPHONE (OUTPATIENT)
Dept: CARDIOLOGY | Facility: CLINIC | Age: 77
End: 2022-07-08

## 2022-07-08 DIAGNOSIS — Z79.899 LONG-TERM USE OF HIGH-RISK MEDICATION: Primary | ICD-10-CM

## 2022-07-08 NOTE — TELEPHONE ENCOUNTER
----- Message from Ofelia Sexton MD sent at 7/7/2022  1:52 PM EDT -----  Stop indapamide.  BMP in 1 mo

## 2022-07-11 RX ORDER — LISINOPRIL 40 MG/1
TABLET ORAL
Qty: 90 TABLET | Refills: 3 | Status: SHIPPED | OUTPATIENT
Start: 2022-07-11

## 2022-07-11 RX ORDER — AMLODIPINE BESYLATE 10 MG/1
10 TABLET ORAL DAILY
Qty: 90 TABLET | Refills: 3 | Status: SHIPPED | OUTPATIENT
Start: 2022-07-11

## 2022-07-17 DIAGNOSIS — E11.22 TYPE 2 DIABETES MELLITUS WITH STAGE 3A CHRONIC KIDNEY DISEASE, WITH LONG-TERM CURRENT USE OF INSULIN: ICD-10-CM

## 2022-07-17 DIAGNOSIS — N18.31 TYPE 2 DIABETES MELLITUS WITH STAGE 3A CHRONIC KIDNEY DISEASE, WITH LONG-TERM CURRENT USE OF INSULIN: ICD-10-CM

## 2022-07-17 DIAGNOSIS — Z79.4 TYPE 2 DIABETES MELLITUS WITH STAGE 3A CHRONIC KIDNEY DISEASE, WITH LONG-TERM CURRENT USE OF INSULIN: ICD-10-CM

## 2022-07-18 RX ORDER — DAPAGLIFLOZIN 10 MG/1
TABLET, FILM COATED ORAL
Qty: 90 TABLET | Refills: 0 | Status: SHIPPED | OUTPATIENT
Start: 2022-07-18 | End: 2022-08-10

## 2022-08-09 RX ORDER — INDAPAMIDE 1.25 MG/1
TABLET, FILM COATED ORAL
Qty: 90 TABLET | Refills: 0 | OUTPATIENT
Start: 2022-08-09

## 2022-08-10 DIAGNOSIS — Z79.4 TYPE 2 DIABETES MELLITUS WITH STAGE 3A CHRONIC KIDNEY DISEASE, WITH LONG-TERM CURRENT USE OF INSULIN: ICD-10-CM

## 2022-08-10 DIAGNOSIS — E11.22 TYPE 2 DIABETES MELLITUS WITH STAGE 3A CHRONIC KIDNEY DISEASE, WITH LONG-TERM CURRENT USE OF INSULIN: ICD-10-CM

## 2022-08-10 DIAGNOSIS — N18.31 TYPE 2 DIABETES MELLITUS WITH STAGE 3A CHRONIC KIDNEY DISEASE, WITH LONG-TERM CURRENT USE OF INSULIN: ICD-10-CM

## 2022-08-10 RX ORDER — DAPAGLIFLOZIN 10 MG/1
TABLET, FILM COATED ORAL
Qty: 90 TABLET | Refills: 0 | Status: SHIPPED | OUTPATIENT
Start: 2022-08-10 | End: 2023-01-11

## 2022-08-10 NOTE — TELEPHONE ENCOUNTER
Last office visit 02/09/2022.  Follow up scheduled for 10/05/2022.    Pharmacy did not get rx from 07/18/2022.

## 2022-08-11 ENCOUNTER — OFFICE VISIT (OUTPATIENT)
Dept: PULMONOLOGY | Facility: CLINIC | Age: 77
End: 2022-08-11

## 2022-08-11 VITALS
TEMPERATURE: 98 F | BODY MASS INDEX: 27.68 KG/M2 | DIASTOLIC BLOOD PRESSURE: 60 MMHG | HEIGHT: 68 IN | HEART RATE: 65 BPM | WEIGHT: 182.6 LBS | SYSTOLIC BLOOD PRESSURE: 126 MMHG | OXYGEN SATURATION: 100 %

## 2022-08-11 DIAGNOSIS — G47.33 OBSTRUCTIVE SLEEP APNEA SYNDROME: ICD-10-CM

## 2022-08-11 DIAGNOSIS — J45.909 ASTHMA, UNSPECIFIED ASTHMA SEVERITY, UNSPECIFIED WHETHER COMPLICATED, UNSPECIFIED WHETHER PERSISTENT: Primary | ICD-10-CM

## 2022-08-11 DIAGNOSIS — R06.02 SHORTNESS OF BREATH: ICD-10-CM

## 2022-08-11 PROCEDURE — 94375 RESPIRATORY FLOW VOLUME LOOP: CPT | Performed by: NURSE PRACTITIONER

## 2022-08-11 PROCEDURE — 99214 OFFICE O/P EST MOD 30 MIN: CPT | Performed by: NURSE PRACTITIONER

## 2022-08-11 PROCEDURE — 94729 DIFFUSING CAPACITY: CPT | Performed by: NURSE PRACTITIONER

## 2022-08-11 PROCEDURE — 94726 PLETHYSMOGRAPHY LUNG VOLUMES: CPT | Performed by: NURSE PRACTITIONER

## 2022-08-11 RX ORDER — FLUTICASONE PROPIONATE AND SALMETEROL 500; 50 UG/1; UG/1
1 POWDER RESPIRATORY (INHALATION)
Qty: 60 EACH | Refills: 0 | Status: SHIPPED | OUTPATIENT
Start: 2022-08-11 | End: 2022-09-06

## 2022-08-11 NOTE — PROGRESS NOTES
[facility] Pulmonary Follow up    CHIEF COMPLAINT    Dyspnea with exertion    HISTORY OF PRESENT ILLNESS    Rashad Mnedez is a 77 y.o.male here today for follow-up.  He was last seen a couple months ago by me.  I have recommended full PFTs prior to his next appointment and he has had these completed and wants to review the results.    He states he is a little bit more active than he was at his last appointment.    He does continue to use his Advair twice a day.  He uses his albuterol occasionally for shortness of breath.    He does continue to have dyspnea with any exertion.  He does have to take frequent breaks to recover.    He continues to wear his CPAP every night for EVA.  He does need some new supplies.    He denies fever, chills, sputum production, hemoptysis, night sweats, weight loss, chest pain or palpitations.    He is up-to-date on his current vaccinations.  Is completed by his wife.    Patient Active Problem List   Diagnosis   • Coronary artery disease   • Cerebrovascular disease   • Diabetic retinopathy (HCC)   • Gastroesophageal reflux disease   • Hypertension   • Adiposity   • Obstructive sleep apnea syndrome   • Peripheral vascular disease (HCC)   • Acute cerebral infarction (HCC)   • Allergic rhinitis   • Asthma   • Blurry vision   • Carpal tunnel syndrome   • Diverticulosis   • Dyslipidemia   • Edema   • Irritable bowel syndrome   • Long term current use of insulin (HCC)   • Oral thrush   • Paresthesia   • Restrictive lung disease   • Type 2 diabetes mellitus with hyperglycemia, with long-term current use of insulin (HCC)   • Diverticulitis   • Adenomatous polyp of sigmoid colon   • Stage 3a chronic kidney disease (HCC)   • Shortness of breath       Allergies   Allergen Reactions   • Latex Rash   • Levofloxacin Anaphylaxis   • Sulfa Antibiotics Anaphylaxis   • Beta Adrenergic Blockers Other (See Comments)     Significant bradycardia   • Diovan [Valsartan] Diarrhea   • Entex Lq  [Phenylephrine-Guaifenesin] Hives       Current Outpatient Medications:   •  Accu-Chek Softclix Lancets lancets, Test Three Times daily, Disp: 300 each, Rfl: 2  •  Accu-Chek Softclix Lancets lancets, 1 each by Other route 2 (two) times a day. Dx E11.65, Disp: 60 each, Rfl: 5  •  amLODIPine (NORVASC) 10 MG tablet, Take 1 tablet by mouth Daily., Disp: 90 tablet, Rfl: 3  •  atorvastatin (LIPITOR) 40 MG tablet, TAKE ONE TABLET BY MOUTH EVERY NIGHT AT BEDTIME, NEED LABS, Disp: 90 tablet, Rfl: 1  •  Blood Glucose Monitoring Suppl (ACCU-CHEK COMPACT CARE KIT) kit, Pt. Is visually impaired - qualifies for this meter, Disp: 1 each, Rfl: 0  •  clopidogrel (PLAVIX) 75 MG tablet, TAKE ONE TABLET BY MOUTH DAILY, Disp: 90 tablet, Rfl: 0  •  colestipol (Micronized Colestipol HCl) 1 g tablet, Take 2 tablets by mouth two times daily, separate 2 hours from other medications., Disp: 360 tablet, Rfl: 3  •  Continuous Blood Gluc  (FreeStyle Jarod 2 [place]) device, 1 each Daily., Disp: 1 each, Rfl: 0  •  Continuous Blood Gluc Sensor (FreeStyle Jarod 2 Sensor) misc, 1 each Every 14 (Fourteen) Days., Disp: 6 each, Rfl: 2  •  Diclofenac Sodium (VOLTAREN) 1 % gel gel, Apply 1 g topically to the appropriate area as directed As Needed., Disp: , Rfl:   •  Dulaglutide (Trulicity) 0.75 MG/0.5ML solution pen-injector, Inject 0.75 mg under the skin into the appropriate area as directed Every 7 (Seven) Days., Disp: 2 mL, Rfl: 5  •  Farxiga 10 MG tablet, TAKE ONE TABLET BY MOUTH DAILY, Disp: 90 tablet, Rfl: 0  •  fluticasone (FLONASE) 50 MCG/ACT nasal spray, SPRAY TWO SPRAYS IN EACH NOSTRIL ONCE DAILY, Disp: 1 g, Rfl: 3  •  fluticasone-salmeterol (Advair Diskus) 250-50 MCG/DOSE DISKUS, Inhale 1 puff 2 (Two) Times a Day., Disp: 180 each, Rfl: 3  •  glucose blood (Accu-Chek Compact Test Drum) test strip, Uses 2 times per day. E11.65, Disp: 200 each, Rfl: 3  •  glucose monitor monitoring kit, 1 each 2 (Two) Times a Day. E11.65-  Accu-chek  compact plus glucometer, Disp: 1 each, Rfl: 0  •  guaiFENesin-codeine (ROMILAR-AC) 100-10 MG/5ML syrup, , Disp: , Rfl:   •  indapamide (LOZOL) 1.25 MG tablet, TAKE ONE TABLET BY MOUTH DAILY, Disp: 90 tablet, Rfl: 0  •  Insulin Aspart (NOVOLOG FLEXPEN SC), Inject 42 Units under the skin into the appropriate area as directed 2 (Two) Times a Day., Disp: , Rfl:   •  Insulin Aspart Prot & Aspart (Insulin Asp Prot & Asp FlexPen) (70-30) 100 UNIT/ML suspension pen-injector, Inject 42 Units under the skin into the appropriate area as directed Daily With Breakfast & Dinner., Disp: 75 mL, Rfl: 1  •  Insulin Pen Needle (BD Pen Needle Melody U/F) 32G X 4 MM misc, Inject 1 each under the skin into the appropriate area as directed 2 (two) times a day., Disp: 200 each, Rfl: 3  •  lisinopril (PRINIVIL,ZESTRIL) 40 MG tablet, TAKE ONE TABLET BY MOUTH DAILY, Disp: 90 tablet, Rfl: 3  •  Loratadine (Claritin) 10 MG capsule, Take 1 capsule by mouth Daily., Disp: 90 each, Rfl: 3  •  montelukast (SINGULAIR) 10 MG tablet, Take 1 tablet by mouth Every Night., Disp: 90 tablet, Rfl: 3  •  nitroglycerin (Nitrostat) 0.4 MG SL tablet, Place 1 tablet under the tongue Every 5 (Five) Minutes As Needed for Chest Pain., Disp: 25 tablet, Rfl: 11  •  omeprazole (priLOSEC) 40 MG capsule, Take 1 capsule by mouth Daily., Disp: 90 capsule, Rfl: 3  •  prednisoLONE acetate (PRED FORTE) 1 % ophthalmic suspension, , Disp: , Rfl:   •  terazosin (HYTRIN) 10 MG capsule, TAKE ONE CAPSULE BY MOUTH EVERY NIGHT AT BEDTIME, Disp: 90 capsule, Rfl: 3  •  Ventolin  (90 Base) MCG/ACT inhaler, Inhale 2 puffs Every 4 (Four) Hours As Needed for Wheezing., Disp: 8 g, Rfl: 5  •  Fluticasone-Salmeterol (ADVAIR) 500-50 MCG/ACT DISKUS, Inhale 1 puff 2 (Two) Times a Day., Disp: 60 each, Rfl: 0  MEDICATION LIST AND ALLERGIES REVIEWED.    Social History     Tobacco Use   • Smoking status: Former Smoker     Types: Cigarettes     Quit date: 2016     Years since quittin.2  "  • Smokeless tobacco: Never Used   Vaping Use   • Vaping Use: Never used   Substance Use Topics   • Alcohol use: No   • Drug use: No       FAMILY AND SOCIAL HISTORY REVIEWED.    Review of Systems   Constitutional: Negative for activity change, appetite change, fatigue, fever and unexpected weight change.   HENT: Negative for congestion, postnasal drip, rhinorrhea, sinus pressure, sore throat and voice change.    Eyes: Negative for visual disturbance.   Respiratory: Positive for shortness of breath. Negative for cough, chest tightness and wheezing.    Cardiovascular: Negative for chest pain, palpitations and leg swelling.   Gastrointestinal: Negative for abdominal distention, abdominal pain, nausea and vomiting.   Endocrine: Negative for cold intolerance and heat intolerance.   Genitourinary: Negative for difficulty urinating and urgency.   Musculoskeletal: Negative for arthralgias, back pain and neck pain.   Skin: Negative for color change and pallor.   Allergic/Immunologic: Negative for environmental allergies and food allergies.   Neurological: Negative for dizziness, syncope, weakness and light-headedness.   Hematological: Negative for adenopathy. Does not bruise/bleed easily.   Psychiatric/Behavioral: Negative for agitation and behavioral problems.   .    /60 (BP Location: Left arm, Patient Position: Sitting, Cuff Size: Large Adult)   Pulse 65   Temp 98 °F (36.7 °C) (Infrared)   Ht 171.5 cm (67.52\")   Wt 82.8 kg (182 lb 9.6 oz)   SpO2 100% Comment: room air, at rest  BMI 28.16 kg/m²     Immunization History   Administered Date(s) Administered   • COVID-19 (MODERNA) 1st, 2nd, 3rd Dose Only 02/23/2021, 03/25/2021, 01/26/2022   • Fluzone High Dose =>65 Years ([facility] ONLY) 11/06/2017, 10/29/2018   • Influenza, Unspecified 10/03/2019, 10/01/2020   • Pneumococcal Conjugate 13-Valent (PCV13) 11/06/2017   • Td 03/19/1997   • influenza Split 12/07/2016       Physical Exam  Vitals and nursing note reviewed. "   Constitutional:       Appearance: He is well-developed. He is not diaphoretic.   HENT:      Head: Normocephalic and atraumatic.   Eyes:      Pupils: Pupils are equal, round, and reactive to light.   Neck:      Thyroid: No thyromegaly.   Cardiovascular:      Rate and Rhythm: Normal rate and regular rhythm.      Heart sounds: Normal heart sounds. No murmur heard.    No friction rub. No gallop.   Pulmonary:      Effort: Pulmonary effort is normal. No respiratory distress.      Breath sounds: Normal breath sounds. No wheezing or rales.   Chest:      Chest wall: No tenderness.   Abdominal:      General: Bowel sounds are normal.      Palpations: Abdomen is soft.      Tenderness: There is no abdominal tenderness.   Musculoskeletal:         General: No swelling. Normal range of motion.      Cervical back: Normal range of motion and neck supple.   Lymphadenopathy:      Cervical: No cervical adenopathy.   Skin:     General: Skin is warm and dry.      Capillary Refill: Capillary refill takes less than 2 seconds.   Neurological:      Mental Status: He is alert and oriented to person, place, and time.   Psychiatric:         Mood and Affect: Mood normal.         Behavior: Behavior normal.           RESULTS    PFTS in the office today, read by me, FVC 2.76 82% predicted, FEV1 2.20 91% predicted, FEV1/FVC 80% predicted, TLC 3.67 70% predicted, DLCO 76% predicted, no obstruction, mild restriction and reduced DLCO.    PROBLEM LIST    Problem List Items Addressed This Visit        Pulmonary and Pneumonias    Asthma - Primary    Relevant Medications    Fluticasone-Salmeterol (ADVAIR) 500-50 MCG/ACT DISKUS    Other Relevant Orders    Pulmonary Function Test (Completed)    Shortness of breath       Sleep    Obstructive sleep apnea syndrome    Overview     Description: A.  On CPAP therapy daily at bedtime.                 DISCUSSION     Symptoms here for follow-up.  He did have full PFTs performed in the office today and he does have  a mild restrictive airway pattern.  I did offer to order an HRCT but he declined at this time.  He will call me if he changes his mind.    I did encourage him to stay physically active as much as possible.    I am going to increase his Advair to the 500 dose and have him try this for a month.  If he notices an improvement in his breathing I will call this in for a refill.    He will continue to wear his CPAP every night for EVA.  He does need a refill on his supplies.  I will send this in today to [facility] in [place].    He would like to follow-up in March or April after winter is over.  I did advise him to call with any additional concerns or questions.  We will do full PFTs at his next appointment.    Level of service justified based on 32 minutes spent in patient care on this date of service including, but not limited to: preparing to see the patient, obtaining and/or reviewing history, performing medically appropriate examination, ordering tests/medicine/procedures, independently interpreting results, documenting clinical information in EHR, and counseling/education of patient/family/caregiver. (Level 4 30-39 minutes; Level 5 40-54 minutes)      Gilda Ma, APRN  08/11/202210:41 EDT  Electronically signed     Please note that portions of this note were completed with a voice recognition program.        CC: Sampson Torres MD

## 2022-08-12 ENCOUNTER — OFFICE VISIT (OUTPATIENT)
Dept: GASTROENTEROLOGY | Facility: CLINIC | Age: 77
End: 2022-08-12

## 2022-08-12 VITALS
OXYGEN SATURATION: 97 % | WEIGHT: 183.2 LBS | DIASTOLIC BLOOD PRESSURE: 84 MMHG | HEART RATE: 49 BPM | SYSTOLIC BLOOD PRESSURE: 132 MMHG | HEIGHT: 68 IN | TEMPERATURE: 98.2 F | BODY MASS INDEX: 27.77 KG/M2

## 2022-08-12 DIAGNOSIS — K22.70 BARRETT'S ESOPHAGUS WITHOUT DYSPLASIA: ICD-10-CM

## 2022-08-12 DIAGNOSIS — K21.9 GASTROESOPHAGEAL REFLUX DISEASE, UNSPECIFIED WHETHER ESOPHAGITIS PRESENT: ICD-10-CM

## 2022-08-12 DIAGNOSIS — R10.84 GENERALIZED ABDOMINAL PAIN: Primary | ICD-10-CM

## 2022-08-12 DIAGNOSIS — K21.9 GASTROESOPHAGEAL REFLUX DISEASE: ICD-10-CM

## 2022-08-12 DIAGNOSIS — D36.9 ADENOMATOUS POLYPS: ICD-10-CM

## 2022-08-12 PROCEDURE — 99214 OFFICE O/P EST MOD 30 MIN: CPT | Performed by: INTERNAL MEDICINE

## 2022-08-12 RX ORDER — PANTOPRAZOLE SODIUM 40 MG/1
40 TABLET, DELAYED RELEASE ORAL DAILY
Qty: 90 TABLET | Refills: 3 | Status: SHIPPED | OUTPATIENT
Start: 2022-08-12 | End: 2022-12-12

## 2022-08-12 NOTE — PROGRESS NOTES
PCP:  Sampson Torres MD     No referring provider defined for this encounter.    Chief Complaint   Patient presents with   • Diverticulitis        HPI   The patient comes in with what he thinks is her diverticulitis which just is not improving.  It looks like from records he was treated for what was thought to be diverticulitis and May of this year.  He was given Cipro and Flagyl.  It seemed to improve his symptoms while he was on the antibiotics but then he seemed to have a relapse.  It sounds like in July he he had another attack.  When he had his attack in May he had a CAT scan done at [facility].  This showed diverticulosis with some mild sigmoid wall thickening which could be due to mild diverticulitis.  There was prostate enlargement with calcifications.  This was done 5/4/2022.  He is still having episodic abdominal pain.  It can be below his umbilicus.  It can be bilateral.  It can be in his upper abdomen as well.  Sometimes it is on his left side.  It seems to be worse almost immediately after eating.  He relates having an upper endoscopy remotely in the past and was told maybe had some abnormal tissue.  This was done by Dr. Womack.  His pain presently is not constant.  It can wake him at night.  It sharp.  It is in different locations.  It appears his last colonoscopy was 4/4/2017 and 3 polyps were removed at that time.  There was some prep issues.  The polyps were combination of hyperplastic and tubular adenomas.  Looking through records that were available at the time of the visit he last had an upper endoscopy 11/6/2013.  He had some salmon tissue distally in the esophagus and the biopsies did confirm Edward's esophagus.    Allergies   Allergen Reactions   • Latex Rash   • Levofloxacin Anaphylaxis   • Sulfa Antibiotics Anaphylaxis   • Beta Adrenergic Blockers Other (See Comments)     Significant bradycardia   • Diovan [Valsartan] Diarrhea   • Entex Lq  [Phenylephrine-Guaifenesin] Hives          Current Outpatient Medications:   •  Accu-Chek Softclix Lancets lancets, Test Three Times daily, Disp: 300 each, Rfl: 2  •  Accu-Chek Softclix Lancets lancets, 1 each by Other route 2 (two) times a day. Dx E11.65, Disp: 60 each, Rfl: 5  •  amLODIPine (NORVASC) 10 MG tablet, Take 1 tablet by mouth Daily., Disp: 90 tablet, Rfl: 3  •  atorvastatin (LIPITOR) 40 MG tablet, TAKE ONE TABLET BY MOUTH EVERY NIGHT AT BEDTIME, NEED LABS, Disp: 90 tablet, Rfl: 1  •  Blood Glucose Monitoring Suppl (ACCU-CHEK COMPACT CARE KIT) kit, Pt. Is visually impaired - qualifies for this meter, Disp: 1 each, Rfl: 0  •  clopidogrel (PLAVIX) 75 MG tablet, TAKE ONE TABLET BY MOUTH DAILY, Disp: 90 tablet, Rfl: 0  •  colestipol (Micronized Colestipol HCl) 1 g tablet, Take 2 tablets by mouth two times daily, separate 2 hours from other medications., Disp: 360 tablet, Rfl: 3  •  Continuous Blood Gluc  (FreeStyle Jarod 2 [place]) device, 1 each Daily., Disp: 1 each, Rfl: 0  •  Continuous Blood Gluc Sensor (FreeStyle Jarod 2 Sensor) misc, 1 each Every 14 (Fourteen) Days., Disp: 6 each, Rfl: 2  •  Diclofenac Sodium (VOLTAREN) 1 % gel gel, Apply 1 g topically to the appropriate area as directed As Needed., Disp: , Rfl:   •  Dulaglutide (Trulicity) 0.75 MG/0.5ML solution pen-injector, Inject 0.75 mg under the skin into the appropriate area as directed Every 7 (Seven) Days., Disp: 2 mL, Rfl: 5  •  Farxiga 10 MG tablet, TAKE ONE TABLET BY MOUTH DAILY, Disp: 90 tablet, Rfl: 0  •  fluticasone (FLONASE) 50 MCG/ACT nasal spray, SPRAY TWO SPRAYS IN EACH NOSTRIL ONCE DAILY, Disp: 1 g, Rfl: 3  •  fluticasone-salmeterol (Advair Diskus) 250-50 MCG/DOSE DISKUS, Inhale 1 puff 2 (Two) Times a Day., Disp: 180 each, Rfl: 3  •  Fluticasone-Salmeterol (ADVAIR) 500-50 MCG/ACT DISKUS, Inhale 1 puff 2 (Two) Times a Day., Disp: 60 each, Rfl: 0  •  glucose blood (Accu-Chek Compact Test Drum) test strip, Uses 2 times per day.  E11.65, Disp: 200 each, Rfl: 3  •  glucose monitor monitoring kit, 1 each 2 (Two) Times a Day. E11.65-  Accu-chek compact plus glucometer, Disp: 1 each, Rfl: 0  •  guaiFENesin-codeine (ROMILAR-AC) 100-10 MG/5ML syrup, , Disp: , Rfl:   •  indapamide (LOZOL) 1.25 MG tablet, TAKE ONE TABLET BY MOUTH DAILY, Disp: 90 tablet, Rfl: 0  •  Insulin Aspart (NOVOLOG FLEXPEN SC), Inject 42 Units under the skin into the appropriate area as directed 2 (Two) Times a Day., Disp: , Rfl:   •  Insulin Aspart Prot & Aspart (Insulin Asp Prot & Asp FlexPen) (70-30) 100 UNIT/ML suspension pen-injector, Inject 42 Units under the skin into the appropriate area as directed Daily With Breakfast & Dinner., Disp: 75 mL, Rfl: 1  •  Insulin Pen Needle (BD Pen Needle Melody U/F) 32G X 4 MM misc, Inject 1 each under the skin into the appropriate area as directed 2 (two) times a day., Disp: 200 each, Rfl: 3  •  lisinopril (PRINIVIL,ZESTRIL) 40 MG tablet, TAKE ONE TABLET BY MOUTH DAILY, Disp: 90 tablet, Rfl: 3  •  Loratadine (Claritin) 10 MG capsule, Take 1 capsule by mouth Daily., Disp: 90 each, Rfl: 3  •  montelukast (SINGULAIR) 10 MG tablet, Take 1 tablet by mouth Every Night., Disp: 90 tablet, Rfl: 3  •  nitroglycerin (Nitrostat) 0.4 MG SL tablet, Place 1 tablet under the tongue Every 5 (Five) Minutes As Needed for Chest Pain., Disp: 25 tablet, Rfl: 11  •  omeprazole (priLOSEC) 40 MG capsule, Take 1 capsule by mouth Daily., Disp: 90 capsule, Rfl: 3  •  prednisoLONE acetate (PRED FORTE) 1 % ophthalmic suspension, , Disp: , Rfl:   •  terazosin (HYTRIN) 10 MG capsule, TAKE ONE CAPSULE BY MOUTH EVERY NIGHT AT BEDTIME, Disp: 90 capsule, Rfl: 3  •  Ventolin  (90 Base) MCG/ACT inhaler, Inhale 2 puffs Every 4 (Four) Hours As Needed for Wheezing., Disp: 8 g, Rfl: 5     Past Medical History:   Diagnosis Date   • Allergic rhinitis 9/15/2016   • Blurry vision 9/15/2016   • Cancer (HCC)    • Carotid artery stenosis       Carotid duplex of 02/25/2014  reports nonobstructive plaque in both proximal internal carotid arteries.   • Coronary artery disease    • COVID    • CVA (cerebrovascular accident) ([facility]) 02/24/2014    felt secondary to accelerated hypertension: a. Acute lacunar infarct. b. 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 c.Subsequent admission, 03/01/2014, for acute on subacute ischemic stroke (right thalamic) with the addition of Plavix. d.  Diabetes, uncontrolled.   • Food poisoning    • GERD (gastroesophageal reflux disease)    • H/O echocardiogram     · A.  Echocardiogram of 02/25/2014 reports an ejection fraction of 55-60%, mild concentric   LVH, trace mitral and pulmonic regurgitation, mild tricuspid regurgitation and calculated   • History of chest x-ray 12/09/2014    No acute chest pathology   • History of chest x-ray 11/18/2012    Compared to previous study of 11/17/2012, [place] Catheter has been removed. Cardiomegaly is noted with central congestion and edema. Also there is volume loss at the bases with bibasilar pulmonary opacities and small effusions. These congestive changes are slightly worse since 11/17/2012   • History of chest x-ray 11/17/2012    [place] catheter remains in right main pulmonary artery. Chest tubes well positioned.There is cardiomegaly with volume loss at both bases. Airspace opacity and consolidation is sharifa at the left base and there is mild central congestion.   • History of echocardiogram 02/25/2014    The estimated EF is 55-60%. Mild concentric LVH observed. Trace MR. Mild TR.    • History of PFTs 09/09/2015    Spirometry data is acceptable and reproducible. Patient gave a good effort   • History of PFTs 01/30/2015    No obstruction. Mild to moderate restriction. No air trapping. Low MVV.Low DLCO which corrects when adjusted for alveolar volumes. FEV1 is decreased by 10mL compared to 02/25/2014 spirometry   • History of PFTs 02/26/2014    Mild to moderate nonspecific reduction of the FEV1 and FVC with a  preserved ratio. The FEV1 is 2.13 liters which is 72% of predicted.While technically nonspecific, cannot rule out restriction.    • History of tobacco abuse     with cessation 20 years ago.   • History of transient ischemic attack     in  (right facial drooping and upper extremity weakness).   • Impaired vision    • Obesity    • Oral thrush 9/15/2016   • Polyp of sigmoid colon    • Type 2 diabetes mellitus (HCC)        Past Surgical History:   Procedure Laterality Date   • CATARACT EXTRACTION     • CHOLECYSTECTOMY     • CORONARY ARTERY BYPASS GRAFT     • EYE SURGERY     • ROTATOR CUFF REPAIR Right         Social History     Socioeconomic History   • Marital status:    Tobacco Use   • Smoking status: Former Smoker     Types: Cigarettes     Quit date: 2016     Years since quittin.2   • Smokeless tobacco: Never Used   Vaping Use   • Vaping Use: Never used   Substance and Sexual Activity   • Alcohol use: No   • Drug use: No   • Sexual activity: Defer     Comment: lives with wife        Family History   Problem Relation Age of Onset   • Diabetes Other    • Allergic rhinitis Other    • Arthritis Other    • Asthma Other    • Hyperlipidemia Other    • Skin cancer Other    • Heart disease Other    • Colon polyps Other    • Stroke Other    • Hypertension Other    • Diabetes Father    • Heart failure Father    • Diabetes Sister    • Hypertension Sister    • Heart attack Mother 75        Review of Systems     Vitals:    22 1444   BP: 132/84   Pulse: (!) 49   Temp: 98.2 °F (36.8 °C)   SpO2: 97%        Physical Exam   General Appearance: Alert, in no acute distress   Head: Normocephalic, without obvious abnormality, atraumatic   Eyes: Lids and lashes normal, conjunctivae and sclerae normal, no icterus, no pallor, corneas clear, PERRLA   Ears: Ears appear intact with no abnormalities noted   Abdomen: Normal bowel sounds, no masses, no organomegaly, softnon-tender, non-distended, no guarding   Extremities:  Moves all extremities well, no edema, no cyanosis, no redness   Skin: No bleeding, bruising or rash   Neurologic: Cranial nerves 2 - 12 grossly intact, no focal deficits         Diagnoses and all orders for this visit:    1. Generalized abdominal pain (Primary)  -     CT Abdomen Pelvis Without Contrast; Future    2. Gastroesophageal reflux disease, unspecified whether esophagitis present    3. Edward's esophagus without dysplasia    4. Adenomatous polyps    Impressions and plan #1 abdominal pain: His abdominal pain is migratory.  It is not typical for diverticulitis.  I am going to repeat a CAT scan.  I suspect its going to be best that he has a repeat colonoscopy at some point.  We talked about the fact that diverticulitis is a microperforation.  We do like to wait about 8 weeks from his last attack before reevaluating the colon.  He was given antibiotics in July.  I am going to repeat a CAT scan and see if there is any significant inflammatory change.    #2 reflux disease and history of Edward's esophagus: He is likely going to need a repeat upper endoscopy.  We are going to get him on Protonix and I call that in.  I like to get the CAT scan results first.  It will be interesting to see if his abdominal pain is improved on the Protonix.    Robert Lund MD

## 2022-08-16 RX ORDER — INDAPAMIDE 1.25 MG/1
TABLET, FILM COATED ORAL
Qty: 90 TABLET | Refills: 0 | OUTPATIENT
Start: 2022-08-16

## 2022-08-22 RX ORDER — INDAPAMIDE 1.25 MG/1
TABLET, FILM COATED ORAL
Qty: 90 TABLET | Refills: 0 | OUTPATIENT
Start: 2022-08-22

## 2022-08-22 RX ORDER — OMEPRAZOLE 40 MG/1
40 CAPSULE, DELAYED RELEASE ORAL DAILY
Qty: 90 CAPSULE | Refills: 3 | Status: CANCELLED | OUTPATIENT
Start: 2022-08-22

## 2022-08-25 RX ORDER — INDAPAMIDE 1.25 MG/1
TABLET, FILM COATED ORAL
Qty: 90 TABLET | Refills: 0 | OUTPATIENT
Start: 2022-08-25

## 2022-09-06 DIAGNOSIS — J45.909 ASTHMA, UNSPECIFIED ASTHMA SEVERITY, UNSPECIFIED WHETHER COMPLICATED, UNSPECIFIED WHETHER PERSISTENT: ICD-10-CM

## 2022-09-06 RX ORDER — CLOPIDOGREL BISULFATE 75 MG/1
TABLET ORAL
Qty: 90 TABLET | Refills: 3 | Status: SHIPPED | OUTPATIENT
Start: 2022-09-06

## 2022-09-06 RX ORDER — FLUTICASONE PROPIONATE AND SALMETEROL 500; 50 UG/1; UG/1
POWDER RESPIRATORY (INHALATION)
Qty: 60 EACH | Refills: 5 | Status: SHIPPED | OUTPATIENT
Start: 2022-09-06 | End: 2023-03-13

## 2022-10-05 ENCOUNTER — OFFICE VISIT (OUTPATIENT)
Dept: ENDOCRINOLOGY | Facility: CLINIC | Age: 77
End: 2022-10-05

## 2022-10-05 VITALS
HEIGHT: 67 IN | OXYGEN SATURATION: 98 % | WEIGHT: 186 LBS | SYSTOLIC BLOOD PRESSURE: 124 MMHG | HEART RATE: 62 BPM | DIASTOLIC BLOOD PRESSURE: 72 MMHG | BODY MASS INDEX: 29.19 KG/M2

## 2022-10-05 DIAGNOSIS — Z79.4 TYPE 2 DIABETES MELLITUS WITH STAGE 3A CHRONIC KIDNEY DISEASE, WITH LONG-TERM CURRENT USE OF INSULIN: Primary | ICD-10-CM

## 2022-10-05 DIAGNOSIS — N18.31 TYPE 2 DIABETES MELLITUS WITH STAGE 3A CHRONIC KIDNEY DISEASE, WITH LONG-TERM CURRENT USE OF INSULIN: Primary | ICD-10-CM

## 2022-10-05 DIAGNOSIS — E11.22 TYPE 2 DIABETES MELLITUS WITH STAGE 3A CHRONIC KIDNEY DISEASE, WITH LONG-TERM CURRENT USE OF INSULIN: Primary | ICD-10-CM

## 2022-10-05 LAB
EXPIRATION DATE: NORMAL
EXPIRATION DATE: NORMAL
GLUCOSE BLDC GLUCOMTR-MCNC: 86 MG/DL (ref 70–130)
HBA1C MFR BLD: 7.7 %
Lab: NORMAL
Lab: NORMAL

## 2022-10-05 PROCEDURE — 82947 ASSAY GLUCOSE BLOOD QUANT: CPT | Performed by: INTERNAL MEDICINE

## 2022-10-05 PROCEDURE — 3051F HG A1C>EQUAL 7.0%<8.0%: CPT | Performed by: INTERNAL MEDICINE

## 2022-10-05 PROCEDURE — 83036 HEMOGLOBIN GLYCOSYLATED A1C: CPT | Performed by: INTERNAL MEDICINE

## 2022-10-05 PROCEDURE — 99214 OFFICE O/P EST MOD 30 MIN: CPT | Performed by: INTERNAL MEDICINE

## 2022-10-05 RX ORDER — PROCHLORPERAZINE 25 MG/1
1 SUPPOSITORY RECTAL ONCE
Qty: 1 EACH | Refills: 0 | Status: SHIPPED | OUTPATIENT
Start: 2022-10-05 | End: 2022-10-05

## 2022-10-05 RX ORDER — PROCHLORPERAZINE 25 MG/1
1 SUPPOSITORY RECTAL TAKE AS DIRECTED
Qty: 3 EACH | Refills: 11 | Status: SHIPPED | OUTPATIENT
Start: 2022-10-05

## 2022-10-05 RX ORDER — PROCHLORPERAZINE 25 MG/1
1 SUPPOSITORY RECTAL TAKE AS DIRECTED
Qty: 1 EACH | Refills: 3 | Status: SHIPPED | OUTPATIENT
Start: 2022-10-05

## 2022-10-05 NOTE — PATIENT INSTRUCTIONS
Increase morning insulin to 44 units. Continue 40 units in the evening with supper.  Start trulicity.   You can decrease the dose of insulin by 4 units when you start the new medication if you are concerned about low glucose levels.

## 2022-10-05 NOTE — PROGRESS NOTES
"Chief Complaint   Patient presents with   • Diabetes          HPI   Rashad Mendez is a 77 y.o. male had concerns including Diabetes.    He is checking blood sugars 4+ times per day with sample [place] CGM from PCP office.   Current medications for diabetes include mixed insulin 40 units twice daily.  I prescribed Farxiga and Trulicity at his last visit.  He is taking the farxiga and tolerating without issue. Hasn't started the trulicity as his meter was broken and was concerned about starting a new med without a glucose meter.     The following portions of the patient's history were reviewed and updated as appropriate: allergies, current medications, past family history, past medical history, past social history, past surgical history and problem list.      Review of Systems   Constitutional: Negative.    Endocrine:        See HPI        Physical Exam  Vitals reviewed.   Constitutional:       Appearance: Normal appearance.   Cardiovascular:      Rate and Rhythm: Normal rate.      Pulses:           Dorsalis pedis pulses are 2+ on the right side and 2+ on the left side.   Pulmonary:      Effort: Pulmonary effort is normal.   Feet:      Right foot:      Skin integrity: Skin integrity normal.      Toenail Condition: Right toenails are abnormally thick.      Left foot:      Skin integrity: Skin integrity normal.      Toenail Condition: Left toenails are abnormally thick.      Comments: Diabetic Foot Exam Performed and Monofilament Test Performed    Monofilament 5/5 bilaterally  Neurological:      General: No focal deficit present.      Mental Status: He is alert. Mental status is at baseline.   Psychiatric:         Mood and Affect: Mood normal.         Behavior: Behavior normal.        /72   Pulse 62   Ht 170.2 cm (67\")   Wt 84.4 kg (186 lb)   SpO2 98%   BMI 29.13 kg/m²      Labs and imaging    CMP:  Lab Results   Component Value Date    BUN 22 07/06/2022    CREATININE 1.61 (H) 07/06/2022    EGFRIFNONA 54 (L) " 03/31/2021    EGFRIFAFRI 66 03/31/2021    BCR 13.7 07/06/2022     07/06/2022    K 4.3 07/06/2022    CO2 21.5 (L) 07/06/2022    CALCIUM 9.4 07/06/2022    PROTENTOTREF 7.2 06/09/2022    ALBUMIN 3.90 07/06/2022    LABGLOBREF 3.0 06/09/2022    LABIL2 1.4 06/09/2022    BILITOT 0.6 07/06/2022    ALKPHOS 78 07/06/2022    AST 20 07/06/2022    ALT 21 07/06/2022     Lipid Panel:  Lab Results   Component Value Date    CHOL 141 07/06/2022    TRIG 298 (H) 07/06/2022    HDL 34 (L) 07/06/2022    VLDL 47 (H) 07/06/2022    LDL 60 07/06/2022     HbA1c:  Lab Results   Component Value Date    HGBA1C 7.7 10/05/2022    HGBA1C 7.9 02/09/2022     Glucose:    Lab Results   Component Value Date    POCGLU 86 10/05/2022     Microalbumin:  Lab Results   Component Value Date    MALBCRERATIO <16 02/09/2022     TSH:  Lab Results   Component Value Date    TSH 2.620 06/09/2022       Assessment and plan  Diagnoses and all orders for this visit:    1. Type 2 diabetes mellitus with stage 3a chronic kidney disease, with long-term current use of insulin (HCC) (Primary)  Uncontrolled with A1c 7.7. but improved levels. With both hyper and hypoglycemia. Complicated by CKD 3, retinopathy.   No metformin due to history of GI intolerance.  Continue mixed insulin but increase 44 units AM, continue 40 units PM.   Continue farxiga.   Start trulicity. Can titrate down on insulin by 4 units if concerned about low BGs.   Labs UTD.   Pt's vision is so declined that he is unable to check BGs with glucometer due to inability to see the test strips. Will try to get CGM approved.   -     POC Glucose, Blood  -     POC Glycosylated Hemoglobin (Hb A1C)  -     Continuous Blood Gluc  (Dexcom G6 ) device; 1 each 1 (One) Time for 1 dose.  Dispense: 1 each; Refill: 0  -     Continuous Blood Gluc Sensor (Dexcom G6 Sensor); 1 each Take As Directed.  Dispense: 3 each; Refill: 11  -     Continuous Blood Gluc Transmit (Dexcom G6 Transmitter) misc; 1 each Take As  Directed.  Dispense: 1 each; Refill: 3         Return in about 4 months (around 2/5/2023) for next scheduled follow up. The patient was instructed to contact the clinic with any interval questions or concerns.    Dorita Mitchell, DO   Endocrinologist    Please note that portions of this note were completed with a voice recognition program.

## 2022-10-10 ENCOUNTER — PRIOR AUTHORIZATION (OUTPATIENT)
Dept: ENDOCRINOLOGY | Facility: CLINIC | Age: 77
End: 2022-10-10

## 2022-10-10 NOTE — TELEPHONE ENCOUNTER
N/Aon October 8  The case has been cancelled/closed by health plan/payer/processor/PBM.  Drug  Dexcom G6 Transmitter  Form  [employer] Electronic PA Form    INSURANCE CANCELLED THE PA. CAN TRY TO SEND FOR DME

## 2022-10-14 RX ORDER — ATORVASTATIN CALCIUM 40 MG/1
TABLET, FILM COATED ORAL
Qty: 90 TABLET | Refills: 1 | Status: SHIPPED | OUTPATIENT
Start: 2022-10-14

## 2022-10-14 NOTE — TELEPHONE ENCOUNTER
Lab Results   Component Value Date    CHOL 141 07/06/2022    CHLPL 115 03/31/2021    TRIG 298 (H) 07/06/2022    HDL 34 (L) 07/06/2022    LDL 60 07/06/2022     Lab Results   Component Value Date    GLUCOSE 144 (H) 07/06/2022    BUN 22 07/06/2022    CREATININE 1.61 (H) 07/06/2022    EGFRIFNONA 54 (L) 03/31/2021    EGFRIFAFRI 66 03/31/2021    BCR 13.7 07/06/2022    K 4.3 07/06/2022    CO2 21.5 (L) 07/06/2022    CALCIUM 9.4 07/06/2022    PROTENTOTREF 7.2 06/09/2022    ALBUMIN 3.90 07/06/2022    LABIL2 1.4 06/09/2022    AST 20 07/06/2022    ALT 21 07/06/2022

## 2022-12-04 DIAGNOSIS — E11.22 TYPE 2 DIABETES MELLITUS WITH STAGE 3A CHRONIC KIDNEY DISEASE, WITH LONG-TERM CURRENT USE OF INSULIN: ICD-10-CM

## 2022-12-04 DIAGNOSIS — Z79.4 TYPE 2 DIABETES MELLITUS WITH STAGE 3A CHRONIC KIDNEY DISEASE, WITH LONG-TERM CURRENT USE OF INSULIN: ICD-10-CM

## 2022-12-04 DIAGNOSIS — N18.31 TYPE 2 DIABETES MELLITUS WITH STAGE 3A CHRONIC KIDNEY DISEASE, WITH LONG-TERM CURRENT USE OF INSULIN: ICD-10-CM

## 2022-12-05 RX ORDER — INSULIN ASPART 100 [IU]/ML
INJECTION, SUSPENSION SUBCUTANEOUS
Qty: 75 ML | Refills: 1 | Status: SHIPPED | OUTPATIENT
Start: 2022-12-05

## 2022-12-12 ENCOUNTER — OFFICE VISIT (OUTPATIENT)
Dept: GASTROENTEROLOGY | Facility: CLINIC | Age: 77
End: 2022-12-12

## 2022-12-12 VITALS — HEIGHT: 67 IN | WEIGHT: 184 LBS | BODY MASS INDEX: 28.88 KG/M2

## 2022-12-12 DIAGNOSIS — K21.9 GASTROESOPHAGEAL REFLUX DISEASE, UNSPECIFIED WHETHER ESOPHAGITIS PRESENT: Primary | ICD-10-CM

## 2022-12-12 DIAGNOSIS — R19.7 POSTCHOLECYSTECTOMY DIARRHEA: ICD-10-CM

## 2022-12-12 DIAGNOSIS — K91.89 POSTCHOLECYSTECTOMY DIARRHEA: ICD-10-CM

## 2022-12-12 DIAGNOSIS — D12.5 ADENOMATOUS POLYP OF SIGMOID COLON: ICD-10-CM

## 2022-12-12 PROCEDURE — 99214 OFFICE O/P EST MOD 30 MIN: CPT | Performed by: INTERNAL MEDICINE

## 2022-12-12 RX ORDER — CHOLESTYRAMINE 4 G/9G
POWDER, FOR SUSPENSION ORAL
Qty: 90 EACH | Refills: 3 | Status: SHIPPED | OUTPATIENT
Start: 2022-12-12

## 2022-12-12 RX ORDER — OMEPRAZOLE 40 MG/1
CAPSULE, DELAYED RELEASE ORAL
COMMUNITY
Start: 2022-10-31 | End: 2023-01-30

## 2022-12-12 NOTE — PROGRESS NOTES
PCP:  Sampson Torres MD     No referring provider defined for this encounter.    Chief Complaint   Patient presents with   • Follow-up        HPI   The patient is a 77-year-old gentleman who was having troubles with what he thought was diverticulitis.  He has not had any attacks in the past 6 or 8 weeks.  His main problem is when he eats he has to go to the bathroom urgently.  This is been a problem for many years.  He has had a cholecystectomy.  He was on Colestid that worked nicely for several years.  He then began getting crampy abdominal pain with it and discontinued it.  He had a CAT scan recently on 9/14/2022 which showed a nonobstructing left renal stone and a hernia that had Nunnelly and inguinal hernia bilaterally but had a small amount of bladder in the hernia sac.  1 4/4/2017 he had several polyps removed from the colon.  They were combination of hyperplastic and adenomatous polyps.  3-year follow-up was suggested.  There was some prep issues.  He also has a history of short segment Edward's esophagus.  I can only find an upper endoscopy from 11/6/2013.  He is due from that standpoint.  He is taking it was omeprazole.    Allergies   Allergen Reactions   • Latex Rash   • Levofloxacin Anaphylaxis   • Sulfa Antibiotics Anaphylaxis   • Beta Adrenergic Blockers Other (See Comments)     Significant bradycardia   • Diovan [Valsartan] Diarrhea   • Entex Lq [Phenylephrine-Guaifenesin] Hives          Current Outpatient Medications:   •  Accu-Chek Softclix Lancets lancets, Test Three Times daily, Disp: 300 each, Rfl: 2  •  Accu-Chek Softclix Lancets lancets, 1 each by Other route 2 (two) times a day. Dx E11.65, Disp: 60 each, Rfl: 5  •  amLODIPine (NORVASC) 10 MG tablet, Take 1 tablet by mouth Daily., Disp: 90 tablet, Rfl: 3  •  atorvastatin (LIPITOR) 40 MG tablet, TAKE ONE TABLET BY MOUTH EVERY NIGHT AT BEDTIME ... NEED LABS, Disp: 90 tablet, Rfl: 1  •  Blood Glucose Monitoring Suppl (ACCU-CHEK COMPACT CARE  KIT) kit, Pt. Is visually impaired - qualifies for this meter, Disp: 1 each, Rfl: 0  •  cholestyramine (Questran) 4 GM/DOSE powder, Mix 1 packet with liquid every morning, separate 2 hours from other medications, Disp: 90 each, Rfl: 3  •  clopidogrel (PLAVIX) 75 MG tablet, TAKE ONE TABLET BY MOUTH DAILY, Disp: 90 tablet, Rfl: 3  •  Continuous Blood Gluc Sensor (Dexcom G6 Sensor), 1 each Take As Directed., Disp: 3 each, Rfl: 11  •  Continuous Blood Gluc Transmit (Dexcom G6 Transmitter) misc, 1 each Take As Directed., Disp: 1 each, Rfl: 3  •  Diclofenac Sodium (VOLTAREN) 1 % gel gel, Apply 1 g topically to the appropriate area as directed As Needed., Disp: , Rfl:   •  Dulaglutide (Trulicity) 0.75 MG/0.5ML solution pen-injector, Inject 0.75 mg under the skin into the appropriate area as directed Every 7 (Seven) Days., Disp: 2 mL, Rfl: 5  •  Farxiga 10 MG tablet, TAKE ONE TABLET BY MOUTH DAILY, Disp: 90 tablet, Rfl: 0  •  fluticasone (FLONASE) 50 MCG/ACT nasal spray, SPRAY TWO SPRAYS IN EACH NOSTRIL ONCE DAILY, Disp: 1 g, Rfl: 3  •  fluticasone-salmeterol (Advair Diskus) 250-50 MCG/DOSE DISKUS, Inhale 1 puff 2 (Two) Times a Day., Disp: 180 each, Rfl: 3  •  Fluticasone-Salmeterol (ADVAIR/WIXELA) 500-50 MCG/ACT DISKUS, INHALE ONE PUFF BY MOUTH TWICE A DAY, Disp: 60 each, Rfl: 5  •  glucose blood (Accu-Chek Compact Test Drum) test strip, Uses 2 times per day. E11.65, Disp: 200 each, Rfl: 3  •  glucose monitor monitoring kit, 1 each 2 (Two) Times a Day. E11.65-  Accu-chek compact plus glucometer, Disp: 1 each, Rfl: 0  •  guaiFENesin-codeine (ROMILAR-AC) 100-10 MG/5ML syrup, , Disp: , Rfl:   •  indapamide (LOZOL) 1.25 MG tablet, TAKE ONE TABLET BY MOUTH DAILY, Disp: 90 tablet, Rfl: 0  •  Insulin Aspart (NOVOLOG FLEXPEN SC), Inject 42 Units under the skin into the appropriate area as directed 2 (Two) Times a Day., Disp: , Rfl:   •  Insulin Aspart Prot & Aspart (Insulin Asp Prot & Asp FlexPen) (70-30) 100 UNIT/ML suspension  pen-injector, INJECT 42 UNITS UNDER THE SKIN INTO THE APPROPRIATE AREA AS DIRECTED DAILY WITH BREAKFAST AND DINNER., Disp: 75 mL, Rfl: 1  •  Insulin Pen Needle (BD Pen Needle Melody U/F) 32G X 4 MM misc, Inject 1 each under the skin into the appropriate area as directed 2 (two) times a day., Disp: 200 each, Rfl: 3  •  lisinopril (PRINIVIL,ZESTRIL) 40 MG tablet, TAKE ONE TABLET BY MOUTH DAILY, Disp: 90 tablet, Rfl: 3  •  Loratadine (Claritin) 10 MG capsule, Take 1 capsule by mouth Daily., Disp: 90 each, Rfl: 3  •  montelukast (SINGULAIR) 10 MG tablet, Take 1 tablet by mouth Every Night., Disp: 90 tablet, Rfl: 3  •  nitroglycerin (Nitrostat) 0.4 MG SL tablet, Place 1 tablet under the tongue Every 5 (Five) Minutes As Needed for Chest Pain., Disp: 25 tablet, Rfl: 11  •  omeprazole (priLOSEC) 40 MG capsule, , Disp: , Rfl:   •  prednisoLONE acetate (PRED FORTE) 1 % ophthalmic suspension, , Disp: , Rfl:   •  terazosin (HYTRIN) 10 MG capsule, TAKE ONE CAPSULE BY MOUTH EVERY NIGHT AT BEDTIME, Disp: 90 capsule, Rfl: 3  •  Ventolin  (90 Base) MCG/ACT inhaler, Inhale 2 puffs Every 4 (Four) Hours As Needed for Wheezing., Disp: 8 g, Rfl: 5     Past Medical History:   Diagnosis Date   • Allergic rhinitis 9/15/2016   • Blurry vision 9/15/2016   • Cancer (HCC)    • Carotid artery stenosis       Carotid duplex of 02/25/2014 reports nonobstructive plaque in both proximal internal carotid arteries.   • Coronary artery disease    • COVID    • CVA (cerebrovascular accident) ([facility]) 02/24/2014    felt secondary to accelerated hypertension: a. Acute lacunar infarct. b. 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 c.Subsequent admission, 03/01/2014, for acute on subacute ischemic stroke (right thalamic) with the addition of Plavix. d.  Diabetes, uncontrolled.   • Food poisoning    • GERD (gastroesophageal reflux disease)    • H/O echocardiogram     · A.  Echocardiogram of 02/25/2014 reports an ejection fraction of 55-60%,  mild concentric   LVH, trace mitral and pulmonic regurgitation, mild tricuspid regurgitation and calculated   • History of chest x-ray 12/09/2014    No acute chest pathology   • History of chest x-ray 11/18/2012    Compared to previous study of 11/17/2012, [place] Catheter has been removed. Cardiomegaly is noted with central congestion and edema. Also there is volume loss at the bases with bibasilar pulmonary opacities and small effusions. These congestive changes are slightly worse since 11/17/2012   • History of chest x-ray 11/17/2012    [place] catheter remains in right main pulmonary artery. Chest tubes well positioned.There is cardiomegaly with volume loss at both bases. Airspace opacity and consolidation is sharifa at the left base and there is mild central congestion.   • History of echocardiogram 02/25/2014    The estimated EF is 55-60%. Mild concentric LVH observed. Trace MR. Mild TR.    • History of PFTs 09/09/2015    Spirometry data is acceptable and reproducible. Patient gave a good effort   • History of PFTs 01/30/2015    No obstruction. Mild to moderate restriction. No air trapping. Low MVV.Low DLCO which corrects when adjusted for alveolar volumes. FEV1 is decreased by 10mL compared to 02/25/2014 spirometry   • History of PFTs 02/26/2014    Mild to moderate nonspecific reduction of the FEV1 and FVC with a preserved ratio. The FEV1 is 2.13 liters which is 72% of predicted.While technically nonspecific, cannot rule out restriction.    • History of tobacco abuse     with cessation 20 years ago.   • History of transient ischemic attack     in 1994 (right facial drooping and upper extremity weakness).   • Impaired vision    • Obesity    • Oral thrush 9/15/2016   • Polyp of sigmoid colon    • Type 2 diabetes mellitus (HCC)        Past Surgical History:   Procedure Laterality Date   • CATARACT EXTRACTION     • CHOLECYSTECTOMY     • CORONARY ARTERY BYPASS GRAFT     • EYE SURGERY     • ROTATOR CUFF REPAIR Right          Social History     Socioeconomic History   • Marital status:    Tobacco Use   • Smoking status: Former     Types: Cigarettes     Quit date: 2016     Years since quittin.6   • Smokeless tobacco: Never   Vaping Use   • Vaping Use: Never used   Substance and Sexual Activity   • Alcohol use: No   • Drug use: No   • Sexual activity: Defer     Comment: lives with wife        Family History   Problem Relation Age of Onset   • Diabetes Other    • Allergic rhinitis Other    • Arthritis Other    • Asthma Other    • Hyperlipidemia Other    • Skin cancer Other    • Heart disease Other    • Colon polyps Other    • Stroke Other    • Hypertension Other    • Diabetes Father    • Heart failure Father    • Diabetes Sister    • Hypertension Sister    • Heart attack Mother 75        Review of Systems     There were no vitals filed for this visit.     Physical Exam   General Appearance: Alert, in no acute distress   Head: Normocephalic, without obvious abnormality, atraumatic   Eyes: Lids and lashes normal, conjunctivae and sclerae normal, no icterus, no pallor, corneas clear, PERRLA   Abdomen: No masses, no organomegaly, soft non-tender, non-distended, no guarding, no rebound tenderness   Extremities: Moves all extremities well, no edema, no cyanosis, no redness   Skin: No bleeding, bruising or rash   Neurologic: Cranial nerves 2 - 12 grossly intact, no focal deficits         Diagnoses and all orders for this visit:    1. Gastroesophageal reflux disease, unspecified whether esophagitis present (Primary)    2. Adenomatous polyp of sigmoid colon    3. Postcholecystectomy diarrhea    Other orders  -     cholestyramine (Questran) 4 GM/DOSE powder; Mix 1 packet with liquid every morning, separate 2 hours from other medications  Dispense: 90 each; Refill: 3    Impressions and plan #1 gastroesophageal reflux disease: It looks like he may have had short segment Edward's esophagus or at least intestinal metaplasia near the  GE junction.  I think it is going to be best to reevaluate with an upper endoscopy in the near future.  He he also needs to stay on his proton pump inhibitor.    #2 adenomatous polyps of the colon: He is due for reevaluation of the colon.  We will get that set up in the near future.  He has not had a recent attack of diverticulitis and so I think we should be if it safe from that standpoint.  We could look for microscopic colitis, ulcerative colitis, Crohn's disease as well.    #3 postcholecystectomy diarrhea: I suspect clinically that he has postcholecystectomy diarrhea.  Unguinal start him on some Questran and see if that does not control his diarrhea better.  We will evaluate with a colonoscopy as well.  I do not want to perform the upper and lower endoscopy at the same time given his age and other issues.  I explained that to them in detail.  We will start with a colonoscopy and set up the upper endoscopy at a later date.    Robert Lund MD

## 2022-12-15 ENCOUNTER — TELEPHONE (OUTPATIENT)
Dept: CARDIOLOGY | Facility: CLINIC | Age: 77
End: 2022-12-15

## 2022-12-15 NOTE — TELEPHONE ENCOUNTER
"Lm on  to call me back    Per Dr. Sexton- \"PT may hold Plavix 5-7 days prior to scheduled colonoscopy/scope- BUT he must be on ASA 81 mg daily while off Plavix. He can not be off both at the same time\"    Lm on  to call back to go over the instructions.   "

## 2022-12-16 ENCOUNTER — OUTSIDE FACILITY SERVICE (OUTPATIENT)
Dept: GASTROENTEROLOGY | Facility: CLINIC | Age: 77
End: 2022-12-16

## 2022-12-16 PROCEDURE — 45380 COLONOSCOPY AND BIOPSY: CPT | Performed by: INTERNAL MEDICINE

## 2022-12-16 PROCEDURE — 45385 COLONOSCOPY W/LESION REMOVAL: CPT | Performed by: INTERNAL MEDICINE

## 2022-12-16 PROCEDURE — 88305 TISSUE EXAM BY PATHOLOGIST: CPT

## 2022-12-19 ENCOUNTER — LAB REQUISITION (OUTPATIENT)
Dept: LAB | Facility: HOSPITAL | Age: 77
End: 2022-12-19
Payer: MEDICARE

## 2022-12-19 DIAGNOSIS — D12.0 BENIGN NEOPLASM OF CECUM: ICD-10-CM

## 2022-12-19 DIAGNOSIS — Z12.11 ENCOUNTER FOR SCREENING FOR MALIGNANT NEOPLASM OF COLON: ICD-10-CM

## 2022-12-19 DIAGNOSIS — D12.2 BENIGN NEOPLASM OF ASCENDING COLON: ICD-10-CM

## 2022-12-19 DIAGNOSIS — K57.30 DIVERTICULOSIS OF LARGE INTESTINE WITHOUT PERFORATION OR ABSCESS WITHOUT BLEEDING: ICD-10-CM

## 2022-12-19 DIAGNOSIS — Z86.010 PERSONAL HISTORY OF COLONIC POLYPS: ICD-10-CM

## 2022-12-19 DIAGNOSIS — D12.3 BENIGN NEOPLASM OF TRANSVERSE COLON: ICD-10-CM

## 2022-12-19 DIAGNOSIS — R19.7 DIARRHEA, UNSPECIFIED: ICD-10-CM

## 2022-12-20 LAB — REF LAB TEST METHOD: NORMAL

## 2023-01-04 DIAGNOSIS — E11.65 UNCONTROLLED TYPE 2 DIABETES MELLITUS WITH HYPERGLYCEMIA: ICD-10-CM

## 2023-01-04 RX ORDER — PEN NEEDLE, DIABETIC 32GX 5/32"
NEEDLE, DISPOSABLE MISCELLANEOUS
Qty: 200 EACH | Refills: 3 | Status: SHIPPED | OUTPATIENT
Start: 2023-01-04

## 2023-01-04 NOTE — TELEPHONE ENCOUNTER
Pt called requesting a prescription for BD pen needle giuliana u/f 32g x 4 mm. Pt last f/u 10/05/22 pt next f/u 04/05/23

## 2023-01-11 DIAGNOSIS — Z79.4 TYPE 2 DIABETES MELLITUS WITH STAGE 3A CHRONIC KIDNEY DISEASE, WITH LONG-TERM CURRENT USE OF INSULIN: ICD-10-CM

## 2023-01-11 DIAGNOSIS — N18.31 TYPE 2 DIABETES MELLITUS WITH STAGE 3A CHRONIC KIDNEY DISEASE, WITH LONG-TERM CURRENT USE OF INSULIN: ICD-10-CM

## 2023-01-11 DIAGNOSIS — E11.22 TYPE 2 DIABETES MELLITUS WITH STAGE 3A CHRONIC KIDNEY DISEASE, WITH LONG-TERM CURRENT USE OF INSULIN: ICD-10-CM

## 2023-01-11 RX ORDER — DAPAGLIFLOZIN 10 MG/1
TABLET, FILM COATED ORAL
Qty: 90 TABLET | Refills: 1 | Status: SHIPPED | OUTPATIENT
Start: 2023-01-11

## 2023-01-29 DIAGNOSIS — J30.1 SEASONAL ALLERGIC RHINITIS DUE TO POLLEN: ICD-10-CM

## 2023-01-29 DIAGNOSIS — J45.20 MILD INTERMITTENT ASTHMA WITHOUT COMPLICATION: ICD-10-CM

## 2023-01-30 RX ORDER — MONTELUKAST SODIUM 10 MG/1
TABLET ORAL
Qty: 90 TABLET | Refills: 3 | Status: SHIPPED | OUTPATIENT
Start: 2023-01-30

## 2023-01-30 RX ORDER — OMEPRAZOLE 40 MG/1
CAPSULE, DELAYED RELEASE ORAL
Qty: 90 CAPSULE | Refills: 1 | Status: SHIPPED | OUTPATIENT
Start: 2023-01-30

## 2023-03-13 DIAGNOSIS — J45.909 ASTHMA, UNSPECIFIED ASTHMA SEVERITY, UNSPECIFIED WHETHER COMPLICATED, UNSPECIFIED WHETHER PERSISTENT: ICD-10-CM

## 2023-03-13 RX ORDER — FLUTICASONE PROPIONATE AND SALMETEROL 500; 50 UG/1; UG/1
POWDER RESPIRATORY (INHALATION)
Qty: 60 EACH | Refills: 1 | Status: SHIPPED | OUTPATIENT
Start: 2023-03-13

## 2023-04-05 ENCOUNTER — OFFICE VISIT (OUTPATIENT)
Dept: ENDOCRINOLOGY | Facility: CLINIC | Age: 78
End: 2023-04-05
Payer: MEDICARE

## 2023-04-05 VITALS
DIASTOLIC BLOOD PRESSURE: 70 MMHG | OXYGEN SATURATION: 98 % | HEIGHT: 67 IN | BODY MASS INDEX: 28.72 KG/M2 | HEART RATE: 64 BPM | SYSTOLIC BLOOD PRESSURE: 110 MMHG | WEIGHT: 183 LBS

## 2023-04-05 DIAGNOSIS — E11.22 TYPE 2 DIABETES MELLITUS WITH STAGE 3A CHRONIC KIDNEY DISEASE, WITH LONG-TERM CURRENT USE OF INSULIN: Primary | ICD-10-CM

## 2023-04-05 DIAGNOSIS — N18.31 TYPE 2 DIABETES MELLITUS WITH STAGE 3A CHRONIC KIDNEY DISEASE, WITH LONG-TERM CURRENT USE OF INSULIN: Primary | ICD-10-CM

## 2023-04-05 DIAGNOSIS — Z79.4 TYPE 2 DIABETES MELLITUS WITH STAGE 3A CHRONIC KIDNEY DISEASE, WITH LONG-TERM CURRENT USE OF INSULIN: Primary | ICD-10-CM

## 2023-04-05 DIAGNOSIS — E78.2 MIXED HYPERLIPIDEMIA: ICD-10-CM

## 2023-04-05 LAB
EXPIRATION DATE: ABNORMAL
EXPIRATION DATE: NORMAL
GLUCOSE BLDC GLUCOMTR-MCNC: 143 MG/DL (ref 70–130)
HBA1C MFR BLD: 6 %
Lab: ABNORMAL
Lab: NORMAL

## 2023-04-05 PROCEDURE — 1159F MED LIST DOCD IN RCRD: CPT | Performed by: INTERNAL MEDICINE

## 2023-04-05 PROCEDURE — 3074F SYST BP LT 130 MM HG: CPT | Performed by: INTERNAL MEDICINE

## 2023-04-05 PROCEDURE — 3078F DIAST BP <80 MM HG: CPT | Performed by: INTERNAL MEDICINE

## 2023-04-05 PROCEDURE — 1160F RVW MEDS BY RX/DR IN RCRD: CPT | Performed by: INTERNAL MEDICINE

## 2023-04-05 PROCEDURE — 99214 OFFICE O/P EST MOD 30 MIN: CPT | Performed by: INTERNAL MEDICINE

## 2023-04-05 PROCEDURE — 83036 HEMOGLOBIN GLYCOSYLATED A1C: CPT | Performed by: INTERNAL MEDICINE

## 2023-04-05 PROCEDURE — 3044F HG A1C LEVEL LT 7.0%: CPT | Performed by: INTERNAL MEDICINE

## 2023-04-05 PROCEDURE — 82947 ASSAY GLUCOSE BLOOD QUANT: CPT | Performed by: INTERNAL MEDICINE

## 2023-04-05 NOTE — PROGRESS NOTES
"Chief Complaint   Patient presents with   • Diabetes          HPI   Rashad Mendez is a 78 y.o. male had concerns including Diabetes.    He is checking blood sugars 0 times per day. Doesn't have a meter he can read due to poor vision. We are working to get him a CGM.   Current medications for diabetes include farxiga 10 mg and mixed insulin 40 units twice a day.     He isn't taking the trulicity because he wasn't checking his glucose levels and was concerned to take a new script due to not monitoring BGs.    The following portions of the patient's history were reviewed and updated as appropriate: allergies, current medications, past family history, past medical history, past social history, past surgical history and problem list.      Review of Systems   Constitutional: Negative.    Eyes: Positive for visual disturbance.   Endocrine: Negative.         Physical Exam  Vitals reviewed.   Constitutional:       Appearance: Normal appearance. He is obese.      Comments: Body mass index is 28.66 kg/m².   Cardiovascular:      Rate and Rhythm: Normal rate.   Pulmonary:      Effort: Pulmonary effort is normal.   Neurological:      General: No focal deficit present.      Mental Status: He is alert. Mental status is at baseline.   Psychiatric:         Mood and Affect: Mood normal.         Behavior: Behavior normal.        /70 (BP Location: Left arm, Patient Position: Sitting, Cuff Size: Adult)   Pulse 64   Ht 170.2 cm (67.01\")   Wt 83 kg (183 lb)   SpO2 98%   BMI 28.66 kg/m²      Labs and imaging    CMP:  Lab Results   Component Value Date    BUN 22 07/06/2022    CREATININE 1.61 (H) 07/06/2022    EGFRIFNONA 54 (L) 03/31/2021    EGFRIFAFRI 66 03/31/2021    BCR 13.7 07/06/2022     07/06/2022    K 4.3 07/06/2022    CO2 21.5 (L) 07/06/2022    CALCIUM 9.4 07/06/2022    PROTENTOTREF 7.2 06/09/2022    ALBUMIN 3.90 07/06/2022    LABGLOBREF 3.0 06/09/2022    LABIL2 1.4 06/09/2022    BILITOT 0.6 07/06/2022    ALKPHOS 78 " 07/06/2022    AST 20 07/06/2022    ALT 21 07/06/2022     Lipid Panel:  Lab Results   Component Value Date    CHOL 141 07/06/2022    TRIG 298 (H) 07/06/2022    HDL 34 (L) 07/06/2022    VLDL 47 (H) 07/06/2022    LDL 60 07/06/2022     HbA1c:  Lab Results   Component Value Date    HGBA1C 6.0 04/05/2023    HGBA1C 7.7 10/05/2022     Glucose:    Lab Results   Component Value Date    POCGLU 143 (A) 04/05/2023     Microalbumin:  Lab Results   Component Value Date    MALBCRERATIO <16 02/09/2022     TSH:  Lab Results   Component Value Date    TSH 2.620 06/09/2022       Assessment and plan  Diagnoses and all orders for this visit:    1. Type 2 diabetes mellitus with stage 3a chronic kidney disease, with long-term current use of insulin (Primary)  Seemingly controlled, A1c down to 6.0. Complicated by CKD 3 and retinopathy.   He isn't checking glucose levels - has vision impairment. We are still working to get a CGM covered- will reach out to DME.   He must check his glucose levels.   If glucose levels are < 100 often, decrease insulin by 4 units. For now continue mixed insulin 40 units twice daily.   Continue farxiga.   Encouraged him to start trulicity. He may need dose reductions in insulin.   Labs UTD from July. MF UTD from 10/22. Ophtho exam UTD - had surgery a few weeks ago - laser for cataract.   -     POC Glycosylated Hemoglobin (Hb A1C)  -     POC Glucose, Blood    2. Mixed hyperlipidemia  Lipids UTD from July. TGs were quite high. Continue atorvastatin. Monitor yearly.        Return in about 4 months (around 8/5/2023) for next scheduled follow up. The patient was instructed to contact the clinic with any interval questions or concerns.    Dorita Mitchell, DO   Endocrinologist    Please note that portions of this note were completed with a voice recognition program.

## 2023-04-18 RX ORDER — ATORVASTATIN CALCIUM 40 MG/1
40 TABLET, FILM COATED ORAL NIGHTLY
Qty: 90 TABLET | Refills: 1 | Status: SHIPPED | OUTPATIENT
Start: 2023-04-18

## 2023-04-20 ENCOUNTER — TELEPHONE (OUTPATIENT)
Dept: ENDOCRINOLOGY | Facility: CLINIC | Age: 78
End: 2023-04-20

## 2023-04-20 NOTE — TELEPHONE ENCOUNTER
PT CALLED STATING HE CANNOT SEE WELL AND HE IS UNABLE TO READ HIS GLUCOMETER. HE REQUESTED AN RX FOR A METER HE CAN READ ON HIS PHONE.

## 2023-05-12 RX ORDER — TERAZOSIN 10 MG/1
CAPSULE ORAL
Qty: 90 CAPSULE | Refills: 3 | Status: SHIPPED | OUTPATIENT
Start: 2023-05-12

## 2023-06-10 NOTE — TELEPHONE ENCOUNTER
Patient has an appointment in April and is needing a refill on his colestid to last him until that appointment.   
Statement Selected

## 2023-08-22 ENCOUNTER — OFFICE VISIT (OUTPATIENT)
Dept: ENDOCRINOLOGY | Facility: CLINIC | Age: 78
End: 2023-08-22
Payer: MEDICARE

## 2023-08-22 VITALS
WEIGHT: 185 LBS | OXYGEN SATURATION: 99 % | DIASTOLIC BLOOD PRESSURE: 64 MMHG | HEART RATE: 57 BPM | BODY MASS INDEX: 29.03 KG/M2 | HEIGHT: 67 IN | SYSTOLIC BLOOD PRESSURE: 138 MMHG

## 2023-08-22 DIAGNOSIS — R35.1 NOCTURIA ASSOCIATED WITH BENIGN PROSTATIC HYPERPLASIA: ICD-10-CM

## 2023-08-22 DIAGNOSIS — E78.2 MIXED HYPERLIPIDEMIA: ICD-10-CM

## 2023-08-22 DIAGNOSIS — N40.1 NOCTURIA ASSOCIATED WITH BENIGN PROSTATIC HYPERPLASIA: ICD-10-CM

## 2023-08-22 DIAGNOSIS — E11.22 TYPE 2 DIABETES MELLITUS WITH STAGE 3A CHRONIC KIDNEY DISEASE, WITH LONG-TERM CURRENT USE OF INSULIN: Primary | ICD-10-CM

## 2023-08-22 DIAGNOSIS — N18.31 TYPE 2 DIABETES MELLITUS WITH STAGE 3A CHRONIC KIDNEY DISEASE, WITH LONG-TERM CURRENT USE OF INSULIN: Primary | ICD-10-CM

## 2023-08-22 DIAGNOSIS — Z79.4 TYPE 2 DIABETES MELLITUS WITH STAGE 3A CHRONIC KIDNEY DISEASE, WITH LONG-TERM CURRENT USE OF INSULIN: Primary | ICD-10-CM

## 2023-08-22 LAB
EXPIRATION DATE: ABNORMAL
EXPIRATION DATE: NORMAL
GLUCOSE BLDC GLUCOMTR-MCNC: 65 MG/DL (ref 70–130)
HBA1C MFR BLD: 6.4 %
Lab: ABNORMAL
Lab: NORMAL

## 2023-08-22 PROCEDURE — 3078F DIAST BP <80 MM HG: CPT | Performed by: INTERNAL MEDICINE

## 2023-08-22 PROCEDURE — 3044F HG A1C LEVEL LT 7.0%: CPT | Performed by: INTERNAL MEDICINE

## 2023-08-22 PROCEDURE — 83036 HEMOGLOBIN GLYCOSYLATED A1C: CPT | Performed by: INTERNAL MEDICINE

## 2023-08-22 PROCEDURE — 36415 COLL VENOUS BLD VENIPUNCTURE: CPT | Performed by: INTERNAL MEDICINE

## 2023-08-22 PROCEDURE — 95251 CONT GLUC MNTR ANALYSIS I&R: CPT | Performed by: INTERNAL MEDICINE

## 2023-08-22 PROCEDURE — 99214 OFFICE O/P EST MOD 30 MIN: CPT | Performed by: INTERNAL MEDICINE

## 2023-08-22 PROCEDURE — 3075F SYST BP GE 130 - 139MM HG: CPT | Performed by: INTERNAL MEDICINE

## 2023-08-22 RX ORDER — INSULIN ASPART 100 [IU]/ML
34-42 INJECTION, SUSPENSION SUBCUTANEOUS 2 TIMES DAILY
Qty: 90 ML | Refills: 1 | Status: SHIPPED | OUTPATIENT
Start: 2023-08-22

## 2023-08-22 NOTE — PROGRESS NOTES
"Chief Complaint   Patient presents with    Diabetes          HPI   Rashad Mendez is a 78 y.o. male had concerns including Diabetes.    He is checking blood sugars 4+ times per day with CGM. Data reviewed from the last two weeks shows average glucose 127 with variability of 34.7%. In target range 83%, high 11%, very high 11%, low 5%, very low 0%.   Pattern of: nocturnal hypoglycemia, postprandial hyperglycemia    Current medications for diabetes include farxiga 10 mg daily, mixed insulin 42 units AM and 38 units PM.     Often wakes with low Bgs. BG 65 in the office today. Took his insulin this morning and forgot to eat.     The following portions of the patient's history were reviewed and updated as appropriate: allergies, current medications, past family history, past medical history, past social history, past surgical history, and problem list.      Review of Systems   Constitutional:  Positive for appetite change and fatigue.   Eyes:  Positive for visual disturbance.   Gastrointestinal:  Positive for diarrhea.   Endocrine:        See HPI      Physical Exam  Vitals reviewed.   Constitutional:       Appearance: Normal appearance.   Cardiovascular:      Rate and Rhythm: Normal rate.   Pulmonary:      Effort: Pulmonary effort is normal.   Neurological:      General: No focal deficit present.      Mental Status: He is alert. Mental status is at baseline.   Psychiatric:         Mood and Affect: Mood normal.         Behavior: Behavior normal.      /64   Pulse 57   Ht 170.2 cm (67\")   Wt 83.9 kg (185 lb)   SpO2 99%   BMI 28.98 kg/mý      Labs and imaging    CMP:  Lab Results   Component Value Date    BUN 22 07/06/2022    CREATININE 1.61 (H) 07/06/2022    EGFRIFNONA 54 (L) 03/31/2021    EGFRIFAFRI 66 03/31/2021    BCR 13.7 07/06/2022     07/06/2022    K 4.3 07/06/2022    CO2 21.5 (L) 07/06/2022    CALCIUM 9.4 07/06/2022    PROTENTOTREF 7.2 06/09/2022    ALBUMIN 3.90 07/06/2022    LABGLOBREF 3.0 06/09/2022 "    LABIL2 1.4 06/09/2022    BILITOT 0.6 07/06/2022    ALKPHOS 78 07/06/2022    AST 20 07/06/2022    ALT 21 07/06/2022     Lipid Panel:  Lab Results   Component Value Date    CHOL 141 07/06/2022    TRIG 298 (H) 07/06/2022    HDL 34 (L) 07/06/2022    VLDL 47 (H) 07/06/2022    LDL 60 07/06/2022     HbA1c:  Lab Results   Component Value Date    HGBA1C 6.4 08/22/2023    HGBA1C 6.0 04/05/2023     Glucose:    Lab Results   Component Value Date    POCGLU 65 (A) 08/22/2023     Microalbumin:  Lab Results   Component Value Date    MALBCRERATIO <16 02/09/2022     TSH:  Lab Results   Component Value Date    TSH 2.620 06/09/2022       Assessment and plan  Diagnoses and all orders for this visit:    1. Type 2 diabetes mellitus with stage 3a chronic kidney disease, with long-term current use of insulin (Primary)  Uncontrolled with too frequent hypoglycemia. Complicated by CKD 3 and retinopathy. A1c up to 6.4.   Continue farxiga 10 mg daily.   Decrease mixed insulin to 34 units at night. Continue 42 units in the morning.   He hasn't wanted to take a GLP-1 RA.   Labs are due. Check today. Ophtho exam UTD and asked the pt to have the report faxed here.  UTD from 4/2023.  -     POC Glycosylated Hemoglobin (Hb A1C)  -     POC Glucose, Blood  -     Insulin Aspart Prot & Aspart (Insulin Asp Prot & Asp FlexPen) (70-30) 100 UNIT/ML suspension pen-injector; Inject 34-42 Units under the skin into the appropriate area as directed 2 (Two) Times a Day. 42 units AM and 34 units PM  Dispense: 90 mL; Refill: 1         Return in about 5 months (around 1/22/2024) for next scheduled follow up. The patient was instructed to contact the clinic with any interval questions or concerns.    Dorita Mitchell,    Endocrinologist    Please note that portions of this note were completed with a voice recognition program.

## 2023-08-22 NOTE — PATIENT INSTRUCTIONS
Please have the eye exam report faxed to [phone].    If your morning glucose levels continue to run < 100 often, decrease the evening insulin dose by 4 units. Target glucose levels between 100-180.

## 2023-08-23 LAB
ALBUMIN SERPL-MCNC: 4.1 G/DL (ref 3.5–5.2)
ALBUMIN/CREAT UR: 82 MG/G CREAT (ref 0–29)
ALBUMIN/GLOB SERPL: 1.5 G/DL
ALP SERPL-CCNC: 76 U/L (ref 39–117)
ALT SERPL-CCNC: 21 U/L (ref 1–41)
AST SERPL-CCNC: 17 U/L (ref 1–40)
BILIRUB SERPL-MCNC: 0.6 MG/DL (ref 0–1.2)
BUN SERPL-MCNC: 19 MG/DL (ref 8–23)
BUN/CREAT SERPL: 14.3 (ref 7–25)
CALCIUM SERPL-MCNC: 9.8 MG/DL (ref 8.6–10.5)
CHLORIDE SERPL-SCNC: 105 MMOL/L (ref 98–107)
CHOLEST SERPL-MCNC: 99 MG/DL (ref 0–200)
CO2 SERPL-SCNC: 22.7 MMOL/L (ref 22–29)
CREAT SERPL-MCNC: 1.33 MG/DL (ref 0.76–1.27)
CREAT UR-MCNC: 89.8 MG/DL
EGFRCR SERPLBLD CKD-EPI 2021: 55.1 ML/MIN/1.73
ERYTHROCYTE [DISTWIDTH] IN BLOOD BY AUTOMATED COUNT: 13.6 % (ref 12.3–15.4)
GLOBULIN SER CALC-MCNC: 2.8 GM/DL
GLUCOSE SERPL-MCNC: 75 MG/DL (ref 65–99)
HCT VFR BLD AUTO: 38.5 % (ref 37.5–51)
HDLC SERPL-MCNC: 33 MG/DL (ref 40–60)
HGB BLD-MCNC: 12.8 G/DL (ref 13–17.7)
LDLC SERPL CALC-MCNC: 38 MG/DL (ref 0–100)
MCH RBC QN AUTO: 29.9 PG (ref 26.6–33)
MCHC RBC AUTO-ENTMCNC: 33.2 G/DL (ref 31.5–35.7)
MCV RBC AUTO: 90 FL (ref 79–97)
MICROALBUMIN UR-MCNC: 73.3 UG/ML
PLATELET # BLD AUTO: 311 10*3/MM3 (ref 140–450)
POTASSIUM SERPL-SCNC: 4.4 MMOL/L (ref 3.5–5.2)
PROT SERPL-MCNC: 6.9 G/DL (ref 6–8.5)
PSA SERPL-MCNC: 3.74 NG/ML (ref 0–4)
RBC # BLD AUTO: 4.28 10*6/MM3 (ref 4.14–5.8)
SODIUM SERPL-SCNC: 140 MMOL/L (ref 136–145)
TRIGL SERPL-MCNC: 168 MG/DL (ref 0–150)
TSH SERPL DL<=0.005 MIU/L-ACNC: 2.51 UIU/ML (ref 0.27–4.2)
VLDLC SERPL CALC-MCNC: 28 MG/DL (ref 5–40)
WBC # BLD AUTO: 9.77 10*3/MM3 (ref 3.4–10.8)

## 2023-09-13 ENCOUNTER — OFFICE VISIT (OUTPATIENT)
Dept: PULMONOLOGY | Facility: CLINIC | Age: 78
End: 2023-09-13
Payer: MEDICARE

## 2023-09-13 VITALS
BODY MASS INDEX: 29.12 KG/M2 | DIASTOLIC BLOOD PRESSURE: 60 MMHG | HEIGHT: 67 IN | HEART RATE: 67 BPM | SYSTOLIC BLOOD PRESSURE: 136 MMHG | RESPIRATION RATE: 16 BRPM | WEIGHT: 185.56 LBS | OXYGEN SATURATION: 97 % | TEMPERATURE: 98.7 F

## 2023-09-13 DIAGNOSIS — R06.02 SHORTNESS OF BREATH: ICD-10-CM

## 2023-09-13 DIAGNOSIS — K21.9 GASTROESOPHAGEAL REFLUX DISEASE, UNSPECIFIED WHETHER ESOPHAGITIS PRESENT: ICD-10-CM

## 2023-09-13 DIAGNOSIS — J30.1 SEASONAL ALLERGIC RHINITIS DUE TO POLLEN: ICD-10-CM

## 2023-09-13 DIAGNOSIS — G47.33 OBSTRUCTIVE SLEEP APNEA SYNDROME: ICD-10-CM

## 2023-09-13 DIAGNOSIS — G47.33 OSA (OBSTRUCTIVE SLEEP APNEA): ICD-10-CM

## 2023-09-13 DIAGNOSIS — J45.909 ASTHMA, UNSPECIFIED ASTHMA SEVERITY, UNSPECIFIED WHETHER COMPLICATED, UNSPECIFIED WHETHER PERSISTENT: Primary | ICD-10-CM

## 2023-09-13 PROCEDURE — 1160F RVW MEDS BY RX/DR IN RCRD: CPT | Performed by: NURSE PRACTITIONER

## 2023-09-13 PROCEDURE — 1159F MED LIST DOCD IN RCRD: CPT | Performed by: NURSE PRACTITIONER

## 2023-09-13 PROCEDURE — 3078F DIAST BP <80 MM HG: CPT | Performed by: NURSE PRACTITIONER

## 2023-09-13 PROCEDURE — 99214 OFFICE O/P EST MOD 30 MIN: CPT | Performed by: NURSE PRACTITIONER

## 2023-09-13 PROCEDURE — 3075F SYST BP GE 130 - 139MM HG: CPT | Performed by: NURSE PRACTITIONER

## 2023-09-13 RX ORDER — FLUTICASONE PROPIONATE AND SALMETEROL 500; 50 UG/1; UG/1
1 POWDER RESPIRATORY (INHALATION)
Qty: 60 EACH | Refills: 11 | Status: SHIPPED | OUTPATIENT
Start: 2023-09-13

## 2023-09-13 RX ORDER — ONDANSETRON 4 MG/1
4 TABLET, ORALLY DISINTEGRATING ORAL EVERY 8 HOURS PRN
COMMUNITY
Start: 2023-08-23

## 2023-09-13 NOTE — PROGRESS NOTES
[facility], [place], operated by [facility] Follow up    CHIEF COMPLAINT    dyspnea    HISTORY OF PRESENT ILLNESS    Rashad Mendez is a 78 y.o.male here today for follow-up.  He was last seen in the office by me in August 2022.  He denies any respiratory illnesses.  He states he has been treated for skin cancer since his last appointment.    He continues to complain of shortness of breath with exertion.  He states sometimes he has shortness of breath with rest.  He does try to stay active during the day.    He uses his Advair 250 twice a day.  He did feel like the 500 dose helped his breathing but he did not call for a refill.  He uses his albuterol 2-3 times per day.    He denies any sputum production or hemoptysis.  He denies any chest pain or palpitations.  He does follow closely with cardiology.  He has a follow-up scheduled in a couple of months.  He denies any lower extremity edema or calf tenderness.    He denies any reflux symptoms.  He does take omeprazole daily.    He has noticed more allergy symptoms recently and takes over-the-counter Claritin and Singulair nightly.  He also has a nasal spray he uses occasionally.    He quit smoking in 2016.    He continues to use his CPAP nightly.  His machine has been recalled and he does need a new machine.  He also needs new supplies for his CPAP.  He uses a nasal pillow.    He is accompanied today by his wife.    Patient Active Problem List   Diagnosis    Coronary artery disease    Cerebrovascular disease    Diabetic retinopathy    Gastroesophageal reflux disease    Hypertension    Adiposity    Obstructive sleep apnea syndrome    Peripheral vascular disease    Acute cerebral infarction    Allergic rhinitis    Asthma    Blurry vision    Carpal tunnel syndrome    Diverticulosis    Dyslipidemia    Edema    Irritable bowel syndrome    Long term current use of insulin    Oral thrush    Paresthesia    Restrictive lung disease    Type 2 diabetes mellitus with hyperglycemia, with long-term current  use of insulin    Diverticulitis    Adenomatous polyp of sigmoid colon    Stage 3a chronic kidney disease    Shortness of breath       Allergies   Allergen Reactions    Latex Rash    Levofloxacin Anaphylaxis    Sulfa Antibiotics Anaphylaxis    Beta Adrenergic Blockers Other (See Comments)     Significant bradycardia    Diovan [Valsartan] Diarrhea    Entex Lq [Phenylephrine-Guaifenesin] Hives       Current Outpatient Medications:     amLODIPine (NORVASC) 10 MG tablet, Take 1 tablet by mouth Daily., Disp: 90 tablet, Rfl: 3    atorvastatin (LIPITOR) 40 MG tablet, Take 1 tablet by mouth Every Night., Disp: 90 tablet, Rfl: 1    BD Pen Needle Melody U/F 32G X 4 MM misc, USE TO INJECT INSULIN TWO TIMES A DAY, Disp: 200 each, Rfl: 3    Blood Glucose Monitoring Suppl (ACCU-CHEK COMPACT CARE KIT) kit, Pt. Is visually impaired - qualifies for this meter, Disp: 1 each, Rfl: 0    cholestyramine (Questran) 4 GM/DOSE powder, Mix 1 packet with liquid every morning, separate 2 hours from other medications, Disp: 90 each, Rfl: 3    clopidogrel (PLAVIX) 75 MG tablet, TAKE ONE TABLET BY MOUTH DAILY, Disp: 90 tablet, Rfl: 3    Continuous Blood Gluc Sensor (Dexcom G6 Sensor), 1 each Take As Directed., Disp: 3 each, Rfl: 11    Continuous Blood Gluc Transmit (Dexcom G6 Transmitter) misc, 1 each Take As Directed., Disp: 1 each, Rfl: 3    dapagliflozin Propanediol (Farxiga) 10 MG tablet, TAKE ONE TABLET BY MOUTH DAILY, Disp: 90 tablet, Rfl: 3    Diclofenac Sodium (VOLTAREN) 1 % gel gel, Apply 1 g topically to the appropriate area as directed As Needed., Disp: , Rfl:     fluticasone (FLONASE) 50 MCG/ACT nasal spray, SPRAY TWO SPRAYS IN EACH NOSTRIL ONCE DAILY, Disp: 1 g, Rfl: 3    guaiFENesin-codeine (ROMILAR-AC) 100-10 MG/5ML syrup, , Disp: , Rfl:     indapamide (LOZOL) 1.25 MG tablet, TAKE ONE TABLET BY MOUTH DAILY, Disp: 90 tablet, Rfl: 0    Insulin Aspart Prot & Aspart (Insulin Asp Prot & Asp FlexPen) (70-30) 100 UNIT/ML suspension  pen-injector, Inject 34-42 Units under the skin into the appropriate area as directed 2 (Two) Times a Day. 42 units AM and 34 units PM, Disp: 90 mL, Rfl: 1    lisinopril (PRINIVIL,ZESTRIL) 40 MG tablet, Take 1 tablet by mouth Daily., Disp: 90 tablet, Rfl: 3    Loratadine (Claritin) 10 MG capsule, Take 1 capsule by mouth Daily., Disp: 90 each, Rfl: 3    montelukast (SINGULAIR) 10 MG tablet, TAKE ONE TABLET BY MOUTH EVERY NIGHT, Disp: 90 tablet, Rfl: 3    nitroglycerin (Nitrostat) 0.4 MG SL tablet, Place 1 tablet under the tongue Every 5 (Five) Minutes As Needed for Chest Pain., Disp: 25 tablet, Rfl: 11    omeprazole (priLOSEC) 40 MG capsule, TAKE ONE CAPSULE BY MOUTH DAILY, Disp: 90 capsule, Rfl: 1    ondansetron ODT (ZOFRAN-ODT) 4 MG disintegrating tablet, Place 1 tablet on the tongue Every 8 (Eight) Hours As Needed., Disp: , Rfl:     prednisoLONE acetate (PRED FORTE) 1 % ophthalmic suspension, , Disp: , Rfl:     terazosin (HYTRIN) 10 MG capsule, TAKE ONE CAPSULE BY MOUTH EVERY NIGHT AT BEDTIME, Disp: 90 capsule, Rfl: 3    Ventolin  (90 Base) MCG/ACT inhaler, Inhale 2 puffs Every 4 (Four) Hours As Needed for Wheezing., Disp: 8 g, Rfl: 5    Fluticasone-Salmeterol (ADVAIR/WIXELA) 500-50 MCG/ACT DISKUS, Inhale 1 puff 2 (Two) Times a Day., Disp: 60 each, Rfl: 11  MEDICATION LIST AND ALLERGIES REVIEWED.    Social History     Tobacco Use    Smoking status: Former     Types: Cigarettes     Quit date: 2016     Years since quittin.3    Smokeless tobacco: Never   Vaping Use    Vaping Use: Never used   Substance Use Topics    Alcohol use: No    Drug use: No       FAMILY AND SOCIAL HISTORY REVIEWED.    Review of Systems   Constitutional:  Positive for fatigue. Negative for activity change, appetite change, fever and unexpected weight change.   HENT:  Negative for congestion, postnasal drip, rhinorrhea, sinus pressure, sore throat and voice change.    Eyes:  Negative for visual disturbance.   Respiratory:   "Positive for shortness of breath. Negative for cough, chest tightness and wheezing.    Cardiovascular:  Negative for chest pain, palpitations and leg swelling.   Gastrointestinal:  Negative for abdominal distention, abdominal pain, nausea and vomiting.   Endocrine: Negative for cold intolerance and heat intolerance.   Genitourinary:  Negative for difficulty urinating and urgency.   Musculoskeletal:  Negative for arthralgias, back pain and neck pain.   Skin:  Negative for color change and pallor.   Allergic/Immunologic: Negative for environmental allergies and food allergies.   Neurological:  Negative for dizziness, syncope, weakness and light-headedness.   Hematological:  Negative for adenopathy. Does not bruise/bleed easily.   Psychiatric/Behavioral:  Negative for agitation and behavioral problems.  .    /60 (BP Location: Left arm, Patient Position: Sitting, Cuff Size: Adult)   Pulse 67   Temp 98.7 °F (37.1 °C)   Resp 16   Ht 170.2 cm (67.01\")   Wt 84.2 kg (185 lb 9 oz)   SpO2 97% Comment: room air at rest  BMI 29.06 kg/m²     Immunization History   Administered Date(s) Administered    COVID-19 (MODERNA) 1st,2nd,3rd Dose Monovalent 02/23/2021, 03/25/2021, 01/26/2022    COVID-19 (MODERNA) BIVALENT 12+YRS 10/14/2022    Fluzone High Dose =>65 Years ([employer] ONLY) 11/06/2017, 10/29/2018    Fluzone High-Dose 65+yrs 10/14/2022    Influenza, Unspecified 10/03/2019, 10/01/2020    Pneumococcal Conjugate 13-Valent (PCV13) 11/06/2017    Td (TDVAX) 03/19/1997    influenza Split 12/07/2016       Physical Exam  Vitals and nursing note reviewed.   Constitutional:       Appearance: He is well-developed. He is not diaphoretic.   HENT:      Head: Normocephalic and atraumatic.   Eyes:      Pupils: Pupils are equal, round, and reactive to light.   Neck:      Thyroid: No thyromegaly.   Cardiovascular:      Rate and Rhythm: Normal rate and regular rhythm.      Heart sounds: Normal heart sounds. No murmur heard.    No " friction rub. No gallop.   Pulmonary:      Effort: Pulmonary effort is normal. No respiratory distress.      Breath sounds: Normal breath sounds. No wheezing or rales.   Chest:      Chest wall: No tenderness.   Abdominal:      General: Bowel sounds are normal.      Palpations: Abdomen is soft.      Tenderness: There is no abdominal tenderness.   Musculoskeletal:         General: No swelling. Normal range of motion.      Cervical back: Normal range of motion and neck supple.   Lymphadenopathy:      Cervical: No cervical adenopathy.   Skin:     General: Skin is warm and dry.      Capillary Refill: Capillary refill takes less than 2 seconds.   Neurological:      Mental Status: He is alert and oriented to person, place, and time.   Psychiatric:         Mood and Affect: Mood normal.         Behavior: Behavior normal.         RESULTS    6-minute walk test: Patient ambulated 600 feet and never desaturated below 94%, he does not meet qualifications for oxygen with activity, his percent of predicted is 41%.    Chest PA/lateral: Official report pending    PROBLEM LIST    Problem List Items Addressed This Visit          Allergies and Adverse Reactions    Allergic rhinitis       Gastrointestinal Abdominal     Gastroesophageal reflux disease       Pulmonary and Pneumonias    Asthma - Primary    Relevant Medications    Fluticasone-Salmeterol (ADVAIR/WIXELA) 500-50 MCG/ACT DISKUS    Other Relevant Orders    XR Chest PA & Lateral    Shortness of breath       Sleep    Obstructive sleep apnea syndrome    Overview     Description: A.  On CPAP therapy daily at bedtime.          Other Visit Diagnoses       EVA (obstructive sleep apnea)        Relevant Orders    PAP Therapy              DISCUSSION    Mr. Mendez was here for follow-up of his dyspnea.  We did review a 6-minute walk test and he does not meet qualifications for oxygen with activity.  I did offer to do his PFTs in the office today but he declined stating he did not feel  well.    We reviewed his chest x-ray but the official report is pending.  I will notify him of any abnormalities.    I am going to go ahead and increase his Advair back to the 500 dose twice a day.  I did encourage him to use the albuterol as needed for shortness of breath.    I did encourage him to stay physically active and try to do 15 minutes of activity a day.    I will order him a new CPAP machine for his EVA.  Also get his download to make sure that his CPAP machine is working correctly.  I did encourage him to wear his CPAP a minimum of 4 hours every night.    He will continue over-the-counter medication as needed for his allergies.    He will continue omeprazole daily for GERD.    He will follow-up in 6 months.    I personally spent a total of 34 minutes on patient visit today including chart review, face to face with the patient obtaining the history and physical exam, review of pertinent images and tests, counseling and discussion and/or coordination of care as described above, and documentation.  Total time excludes time spent on other separate services such as performing procedures or test interpretation, if applicable.        Gilda Ma, LOUISA  09/13/202311:30 EDT  Electronically signed     Please note that portions of this note were completed with a voice recognition program.        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 Continue IV diuresis with Lasix and Metolazone and consider restarting Eliquis 12/17 if patient's hemoptysis has not recurred. S/p 1 unit pRBCs with appropriate response.  Stroke consulted for bilateral upper extremity weakness. patient found to have possible myoclonus/asterixis on exam likely secondary to metabolic encephalopathy.     - recommend following clinical exam  - recommend CTH noncon  - no need for MRI/EEG at this time  - will follow up CTH noncon  - please call stroke neurology with questions

## 2023-10-06 RX ORDER — CLOPIDOGREL BISULFATE 75 MG/1
TABLET ORAL
Qty: 90 TABLET | Refills: 0 | Status: SHIPPED | OUTPATIENT
Start: 2023-10-06

## 2023-10-09 RX ORDER — ATORVASTATIN CALCIUM 40 MG/1
40 TABLET, FILM COATED ORAL NIGHTLY
Qty: 90 TABLET | Refills: 3 | Status: SHIPPED | OUTPATIENT
Start: 2023-10-09

## 2023-10-09 NOTE — TELEPHONE ENCOUNTER
Lab Results   Component Value Date    CHOL 141 07/06/2022    CHLPL 99 08/22/2023    TRIG 168 (H) 08/22/2023    HDL 33 (L) 08/22/2023    LDL 38 08/22/2023

## 2023-11-29 ENCOUNTER — OFFICE VISIT (OUTPATIENT)
Dept: CARDIOLOGY | Facility: CLINIC | Age: 78
End: 2023-11-29
Payer: MEDICARE

## 2023-11-29 VITALS
SYSTOLIC BLOOD PRESSURE: 128 MMHG | BODY MASS INDEX: 29.47 KG/M2 | HEART RATE: 59 BPM | OXYGEN SATURATION: 98 % | WEIGHT: 187.8 LBS | HEIGHT: 67 IN | DIASTOLIC BLOOD PRESSURE: 50 MMHG

## 2023-11-29 DIAGNOSIS — I10 ESSENTIAL HYPERTENSION: ICD-10-CM

## 2023-11-29 DIAGNOSIS — I25.10 CORONARY ARTERY DISEASE INVOLVING NATIVE CORONARY ARTERY OF NATIVE HEART WITHOUT ANGINA PECTORIS: Primary | ICD-10-CM

## 2023-11-29 DIAGNOSIS — E78.5 DYSLIPIDEMIA: ICD-10-CM

## 2023-11-29 PROCEDURE — 93000 ELECTROCARDIOGRAM COMPLETE: CPT | Performed by: INTERNAL MEDICINE

## 2023-11-29 PROCEDURE — 3074F SYST BP LT 130 MM HG: CPT | Performed by: INTERNAL MEDICINE

## 2023-11-29 PROCEDURE — 1159F MED LIST DOCD IN RCRD: CPT | Performed by: INTERNAL MEDICINE

## 2023-11-29 PROCEDURE — 1160F RVW MEDS BY RX/DR IN RCRD: CPT | Performed by: INTERNAL MEDICINE

## 2023-11-29 PROCEDURE — 3078F DIAST BP <80 MM HG: CPT | Performed by: INTERNAL MEDICINE

## 2023-11-29 PROCEDURE — 99213 OFFICE O/P EST LOW 20 MIN: CPT | Performed by: INTERNAL MEDICINE

## 2023-11-29 RX ORDER — NITROGLYCERIN 0.4 MG/1
0.4 TABLET SUBLINGUAL
Qty: 25 TABLET | Refills: 11 | Status: SHIPPED | OUTPATIENT
Start: 2023-11-29

## 2023-11-29 RX ORDER — PANTOPRAZOLE SODIUM 40 MG/1
40 TABLET, DELAYED RELEASE ORAL DAILY
COMMUNITY
Start: 2023-10-30

## 2023-11-29 NOTE — PROGRESS NOTES
[facility]    Patient ID: Rashad Mendez is a 78 y.o. male.  : 1945   Contact: [phone]    Encounter date: 2023    PCP: Sampson Torres MD      Chief complaint:   Chief Complaint   Patient presents with    Coronary Artery Disease       Problem List:  Coronary artery disease:  History of extreme fatigue, 2008.  [facility], 2008: S/p Liberté 3 x 12 mm stent to the OM1. Normal EF.  Cardiolite, 10/29/2012: Positive stress Cardiolite for inducible ischemia in the inferior myocardial segment.  [facility], 2012: Normal EF. Occluded RCA with left-to-right collaterals. 70-80% ostial LM stenosis. Normal EF.  CABG, 2012, Dr. Byers: SVG to LCx, SVG to PDA, LIMA to LAD.  MPS, 2018: No evidence of inducible ischemia.  MPS, 2021; No evidence of ischemia. Low risk study.   Carotid artery disease   Carotid duplex, 2018; 0-49% bilateral carotid artery stenosis. Mild bilateral carotid atherosclerosis.   Dyspnea  Echocardiogram, 2022; EF 75%. Trace MR and TR.   Cerebrovascular disease  History of transient ischemic attack in  (right facial drooping and upper extremity weakness).  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  Subsequent admission, 2014, for acute on subacute ischemic stroke (right thalamic) with the addition of Plavix.  Hypertension:  Bradycardia, on beta-blockers. Coreg discontinued, 2014.  Hyperlipidemia.  Type 2 diabetes mellitus.  Gastroesophageal reflux disease.  Diverticulitis.  Irritable bowel syndrome.  Obstructive sleep apnea with CPAP usage at bedtime.  Asthma.  History of tobacco abuse with cessation 20 years ago.  Obesity.  Surgical history:  Status post cholecystectomy.  Status post right rotator cuff repair    Allergies   Allergen Reactions    Latex Rash    Levofloxacin Anaphylaxis    Sulfa  Antibiotics Anaphylaxis    Beta Adrenergic Blockers Other (See Comments)     Significant bradycardia    Diovan [Valsartan] Diarrhea    Entex Lq [Phenylephrine-Guaifenesin] Hives       Current Medications:    Current Outpatient Medications:     amLODIPine (NORVASC) 10 MG tablet, Take 1 tablet by mouth Daily., Disp: 90 tablet, Rfl: 3    atorvastatin (LIPITOR) 40 MG tablet, TAKE ONE TABLET BY MOUTH ONCE NIGHTLY, Disp: 90 tablet, Rfl: 3    BD Pen Needle Melody U/F 32G X 4 MM misc, USE TO INJECT INSULIN TWO TIMES A DAY, Disp: 200 each, Rfl: 3    Blood Glucose Monitoring Suppl (ACCU-CHEK COMPACT CARE KIT) kit, Pt. Is visually impaired - qualifies for this meter, Disp: 1 each, Rfl: 0    clopidogrel (PLAVIX) 75 MG tablet, TAKE ONE TABLET BY MOUTH DAILY, Disp: 90 tablet, Rfl: 0    Continuous Blood Gluc Sensor (Dexcom G6 Sensor), 1 each Take As Directed., Disp: 3 each, Rfl: 11    Continuous Blood Gluc Transmit (Dexcom G6 Transmitter) misc, 1 each Take As Directed., Disp: 1 each, Rfl: 3    dapagliflozin Propanediol (Farxiga) 10 MG tablet, TAKE ONE TABLET BY MOUTH DAILY, Disp: 90 tablet, Rfl: 3    fluticasone (FLONASE) 50 MCG/ACT nasal spray, SPRAY TWO SPRAYS IN EACH NOSTRIL ONCE DAILY, Disp: 1 g, Rfl: 3    Fluticasone-Salmeterol (ADVAIR/WIXELA) 500-50 MCG/ACT DISKUS, Inhale 1 puff 2 (Two) Times a Day., Disp: 60 each, Rfl: 11    guaiFENesin-codeine (ROMILAR-AC) 100-10 MG/5ML syrup, , Disp: , Rfl:     indapamide (LOZOL) 1.25 MG tablet, TAKE ONE TABLET BY MOUTH DAILY, Disp: 90 tablet, Rfl: 0    Insulin Aspart Prot & Aspart (Insulin Asp Prot & Asp FlexPen) (70-30) 100 UNIT/ML suspension pen-injector, Inject 34-42 Units under the skin into the appropriate area as directed 2 (Two) Times a Day. 42 units AM and 34 units PM, Disp: 90 mL, Rfl: 1    lisinopril (PRINIVIL,ZESTRIL) 40 MG tablet, Take 1 tablet by mouth Daily., Disp: 90 tablet, Rfl: 3    Loratadine (Claritin) 10 MG capsule, Take 1 capsule by mouth Daily., Disp: 90 each, Rfl:  3    montelukast (SINGULAIR) 10 MG tablet, TAKE ONE TABLET BY MOUTH EVERY NIGHT, Disp: 90 tablet, Rfl: 3    nitroglycerin (Nitrostat) 0.4 MG SL tablet, Place 1 tablet under the tongue Every 5 (Five) Minutes As Needed for Chest Pain., Disp: 25 tablet, Rfl: 11    ondansetron ODT (ZOFRAN-ODT) 4 MG disintegrating tablet, Place 1 tablet on the tongue Every 8 (Eight) Hours As Needed., Disp: , Rfl:     pantoprazole (PROTONIX) 40 MG EC tablet, Take 1 tablet by mouth Daily., Disp: , Rfl:     terazosin (HYTRIN) 10 MG capsule, TAKE ONE CAPSULE BY MOUTH EVERY NIGHT AT BEDTIME, Disp: 90 capsule, Rfl: 3    Ventolin  (90 Base) MCG/ACT inhaler, Inhale 2 puffs Every 4 (Four) Hours As Needed for Wheezing., Disp: 8 g, Rfl: 5    cholestyramine (Questran) 4 GM/DOSE powder, Mix 1 packet with liquid every morning, separate 2 hours from other medications (Patient not taking: Reported on 11/29/2023), Disp: 90 each, Rfl: 3    Diclofenac Sodium (VOLTAREN) 1 % gel gel, Apply 1 g topically to the appropriate area as directed As Needed. (Patient not taking: Reported on 11/29/2023), Disp: , Rfl:     omeprazole (priLOSEC) 40 MG capsule, TAKE ONE CAPSULE BY MOUTH DAILY (Patient not taking: Reported on 11/29/2023), Disp: 90 capsule, Rfl: 1    prednisoLONE acetate (PRED FORTE) 1 % ophthalmic suspension, , Disp: , Rfl:     HPI    Rashad Mendez is a 78 y.o. male who presents today for a follow up of CAD and cardiac risk factors. Since last visit, patient has been doing well overall from a cardiovascular standpoint. He monitors his blood pressure regularly and states it usually measures 120 mmHg systolic. Patient experiences chest pain frequently that he attributes to asthma. He stays busy and active by doing yard work but does not have a regular exercise routine. Patient denies chest pain, shortness of breath, orthopnea, palpitations, edema, dizziness, and syncope.       The following portions of the patient's history were reviewed and updated  "as appropriate: allergies, current medications and problem list.    Pertinent positives as listed in the HPI.  All other systems reviewed are negative.         Vitals:    11/29/23 1058 11/29/23 1225   BP: 142/48 128/50   BP Location: Left arm    Patient Position: Sitting    Cuff Size: Adult    Pulse: 59    SpO2: 98%    Weight: 85.2 kg (187 lb 12.8 oz)    Height: 170.2 cm (67.01\")        Physical Exam:  General: Alert and oriented.  Neck: Jugular venous pressure is within normal limits. Carotids have normal upstrokes without bruits.   Cardiovascular: Heart has a nondisplaced focal PMI. Regular rate and rhythm. No murmur, gallop or rub.  Lungs: Clear, no rales or wheezes. Equal expansion is noted.   Extremities: Show no edema.  Skin: Warm and dry.  Neurologic: Nonfocal.     Diagnostic Data (reviewed with patient):  Lab Results   Component Value Date    GLUCOSE 75 08/22/2023    BUN 19 08/22/2023    CREATININE 1.33 (H) 08/22/2023    BCR 14.3 08/22/2023     08/22/2023    K 4.4 08/22/2023     08/22/2023    CO2 22.7 08/22/2023    CALCIUM 9.8 08/22/2023    ALBUMIN 4.1 08/22/2023    ALKPHOS 76 08/22/2023    AST 17 08/22/2023    ALT 21 08/22/2023     Lab Results   Component Value Date    CHLPL 99 08/22/2023    TRIG 168 (H) 08/22/2023    HDL 33 (L) 08/22/2023    LDL 38 08/22/2023      Lab Results   Component Value Date    WBC 9.77 08/22/2023    RBC 4.28 08/22/2023    HGB 12.8 (L) 08/22/2023    HCT 38.5 08/22/2023    MCV 90.0 08/22/2023     08/22/2023      Lab Results   Component Value Date    TSH 2.510 08/22/2023        Advance Care Planning   ACP discussion was declined by the patient. Patient does not have an advance directive, declines further assistance.           ECG 12 Lead    Date/Time: 11/29/2023 12:16 PM  Performed by: Ofelia Sexton MD    Authorized by: Ofelia Sexton MD  Comparison: compared with previous ECG from 12/9/2014  Similar to previous ECG  Comparison to previous ECG: " SB  Left axis deviation  Rhythm: sinus bradycardia  BPM: 59  Conduction: right bundle branch block  QRS axis: left    Clinical impression: abnormal EKG  Comments: Inferior infarct            Assessment:    ICD-10-CM ICD-9-CM   1. Coronary artery disease involving native coronary artery of native heart without angina pectoris  I25.10 414.01   2. Essential hypertension  I10 401.9   3. Dyslipidemia  E78.5 272.4         Plan:  Begin routine aerobic exercise for at least 30 minutes 5 days per week. Exercise bike discussed as his vision is poor.  Continue on amlodipine 10 mg daily for hypertension.   Continue on atorvastatin 40 mg daily for hyperlipidemia.   Continue on Plavix 75 mg daily for antiplatelet therapy.   Continue on lisinopril 40 mg daily for hypertension.   Continue on nitroglycerin 0.4 mg PRN for chest pain.  Continue on terazosin 10 mg daily for hypertension.  Continue all other current medications.  F/up in 12 months, sooner if needed.      Scribed for Ofelia Sexton MD by Ozzy London. 11/29/2023 11:47 EST    I Ofelia Sexton MD personally performed the services described in this documentation as scribed by the above individual in my presence, and it is both accurate and complete.    Ofelia Sexton MD, [facility]

## 2023-12-27 ENCOUNTER — TELEPHONE (OUTPATIENT)
Dept: ENDOCRINOLOGY | Facility: CLINIC | Age: 78
End: 2023-12-27
Payer: MEDICARE

## 2023-12-27 DIAGNOSIS — E11.65 UNCONTROLLED TYPE 2 DIABETES MELLITUS WITH HYPERGLYCEMIA: ICD-10-CM

## 2023-12-27 RX ORDER — PEN NEEDLE, DIABETIC 32GX 5/32"
NEEDLE, DISPOSABLE MISCELLANEOUS
Qty: 300 EACH | Refills: 3 | Status: SHIPPED | OUTPATIENT
Start: 2023-12-27

## 2023-12-27 RX ORDER — CHOLESTYRAMINE 4 G/9G
POWDER, FOR SUSPENSION ORAL
Qty: 90 PACKET | OUTPATIENT
Start: 2023-12-27

## 2023-12-27 NOTE — TELEPHONE ENCOUNTER
PATIENT IS OUT OF PEN NEEDLES. PHARMACY IS TELLING HIM HE CAN'T REFILL UNTIL 1/10/2024. PATIENT STATES THAT SOME DAYS, HE IS USING UP TO THREE A DAY. [facility].

## 2024-01-04 RX ORDER — CLOPIDOGREL BISULFATE 75 MG/1
75 TABLET ORAL DAILY
Qty: 90 TABLET | Refills: 3 | Status: SHIPPED | OUTPATIENT
Start: 2024-01-04

## 2024-01-04 NOTE — TELEPHONE ENCOUNTER
Lab Results   Component Value Date    WBC 9.77 08/22/2023    HGB 12.8 (L) 08/22/2023    HCT 38.5 08/22/2023    MCV 90.0 08/22/2023     08/22/2023

## 2024-01-08 RX ORDER — PANTOPRAZOLE SODIUM 40 MG/1
40 TABLET, DELAYED RELEASE ORAL DAILY
Qty: 90 TABLET | Refills: 1 | Status: SHIPPED | OUTPATIENT
Start: 2024-01-08

## 2024-01-24 DIAGNOSIS — J30.1 SEASONAL ALLERGIC RHINITIS DUE TO POLLEN: ICD-10-CM

## 2024-01-24 DIAGNOSIS — J45.20 MILD INTERMITTENT ASTHMA WITHOUT COMPLICATION: ICD-10-CM

## 2024-01-24 RX ORDER — MONTELUKAST SODIUM 10 MG/1
10 TABLET ORAL NIGHTLY
Qty: 90 TABLET | Refills: 0 | Status: SHIPPED | OUTPATIENT
Start: 2024-01-24

## 2024-02-21 RX ORDER — CHOLESTYRAMINE 4 G/9G
POWDER, FOR SUSPENSION ORAL
Qty: 90 PACKET | Refills: 1 | Status: SHIPPED | OUTPATIENT
Start: 2024-02-21

## 2024-04-21 DIAGNOSIS — J30.1 SEASONAL ALLERGIC RHINITIS DUE TO POLLEN: ICD-10-CM

## 2024-04-21 DIAGNOSIS — J45.20 MILD INTERMITTENT ASTHMA WITHOUT COMPLICATION: ICD-10-CM

## 2024-04-22 RX ORDER — MONTELUKAST SODIUM 10 MG/1
10 TABLET ORAL NIGHTLY
Qty: 90 TABLET | Refills: 0 | Status: SHIPPED | OUTPATIENT
Start: 2024-04-22

## 2024-05-03 DIAGNOSIS — N18.31 TYPE 2 DIABETES MELLITUS WITH STAGE 3A CHRONIC KIDNEY DISEASE, WITH LONG-TERM CURRENT USE OF INSULIN: ICD-10-CM

## 2024-05-03 DIAGNOSIS — E11.22 TYPE 2 DIABETES MELLITUS WITH STAGE 3A CHRONIC KIDNEY DISEASE, WITH LONG-TERM CURRENT USE OF INSULIN: ICD-10-CM

## 2024-05-03 DIAGNOSIS — Z79.4 TYPE 2 DIABETES MELLITUS WITH STAGE 3A CHRONIC KIDNEY DISEASE, WITH LONG-TERM CURRENT USE OF INSULIN: ICD-10-CM

## 2024-05-06 RX ORDER — INSULIN ASPART 100 [IU]/ML
INJECTION, SUSPENSION SUBCUTANEOUS
Qty: 69 ML | Refills: 3 | Status: SHIPPED | OUTPATIENT
Start: 2024-05-06

## 2024-05-09 DIAGNOSIS — Z79.4 TYPE 2 DIABETES MELLITUS WITH STAGE 3A CHRONIC KIDNEY DISEASE, WITH LONG-TERM CURRENT USE OF INSULIN: ICD-10-CM

## 2024-05-09 DIAGNOSIS — N18.31 TYPE 2 DIABETES MELLITUS WITH STAGE 3A CHRONIC KIDNEY DISEASE, WITH LONG-TERM CURRENT USE OF INSULIN: ICD-10-CM

## 2024-05-09 DIAGNOSIS — E11.22 TYPE 2 DIABETES MELLITUS WITH STAGE 3A CHRONIC KIDNEY DISEASE, WITH LONG-TERM CURRENT USE OF INSULIN: ICD-10-CM

## 2024-05-09 RX ORDER — INSULIN ASPART 100 [IU]/ML
INJECTION, SUSPENSION SUBCUTANEOUS
Qty: 69 ML | Refills: 3 | OUTPATIENT
Start: 2024-05-09

## 2024-05-20 RX ORDER — TERAZOSIN 10 MG/1
CAPSULE ORAL
Qty: 90 CAPSULE | Refills: 3 | Status: SHIPPED | OUTPATIENT
Start: 2024-05-20

## 2024-05-20 NOTE — TELEPHONE ENCOUNTER
Lab Results   Component Value Date    GLUCOSE 75 08/22/2023    BUN 19 08/22/2023    CREATININE 1.33 (H) 08/22/2023    EGFRRESULT 55.1 (L) 08/22/2023    EGFR 44.0 (L) 07/06/2022    BCR 14.3 08/22/2023    K 4.4 08/22/2023    CO2 22.7 08/22/2023    CALCIUM 9.8 08/22/2023    PROTENTOTREF 6.9 08/22/2023    ALBUMIN 4.1 08/22/2023    BILITOT 0.6 08/22/2023    AST 17 08/22/2023    ALT 21 08/22/2023

## 2024-07-02 RX ORDER — LISINOPRIL 40 MG/1
40 TABLET ORAL DAILY
Qty: 90 TABLET | Refills: 3 | Status: SHIPPED | OUTPATIENT
Start: 2024-07-02

## 2024-07-02 RX ORDER — AMLODIPINE BESYLATE 10 MG/1
10 TABLET ORAL DAILY
Qty: 90 TABLET | Refills: 3 | Status: SHIPPED | OUTPATIENT
Start: 2024-07-02

## 2024-07-17 ENCOUNTER — OFFICE VISIT (OUTPATIENT)
Dept: ENDOCRINOLOGY | Facility: CLINIC | Age: 79
End: 2024-07-17
Payer: MEDICARE

## 2024-07-17 VITALS
DIASTOLIC BLOOD PRESSURE: 50 MMHG | HEART RATE: 54 BPM | OXYGEN SATURATION: 99 % | HEIGHT: 67 IN | WEIGHT: 183 LBS | BODY MASS INDEX: 28.72 KG/M2 | SYSTOLIC BLOOD PRESSURE: 134 MMHG

## 2024-07-17 DIAGNOSIS — E78.5 DYSLIPIDEMIA: ICD-10-CM

## 2024-07-17 DIAGNOSIS — E11.22 TYPE 2 DIABETES MELLITUS WITH STAGE 3A CHRONIC KIDNEY DISEASE, WITH LONG-TERM CURRENT USE OF INSULIN: Primary | ICD-10-CM

## 2024-07-17 DIAGNOSIS — R35.1 NOCTURIA: ICD-10-CM

## 2024-07-17 DIAGNOSIS — Z79.4 TYPE 2 DIABETES MELLITUS WITH STAGE 3A CHRONIC KIDNEY DISEASE, WITH LONG-TERM CURRENT USE OF INSULIN: Primary | ICD-10-CM

## 2024-07-17 DIAGNOSIS — N18.31 TYPE 2 DIABETES MELLITUS WITH STAGE 3A CHRONIC KIDNEY DISEASE, WITH LONG-TERM CURRENT USE OF INSULIN: Primary | ICD-10-CM

## 2024-07-17 LAB
ALBUMIN SERPL-MCNC: 4 G/DL (ref 3.5–5.2)
ALBUMIN UR-MCNC: 8.8 MG/DL
ALBUMIN/GLOB SERPL: 1.3 G/DL
ALP SERPL-CCNC: 83 U/L (ref 39–117)
ALT SERPL W P-5'-P-CCNC: 20 U/L (ref 1–41)
ANION GAP SERPL CALCULATED.3IONS-SCNC: 10.8 MMOL/L (ref 5–15)
AST SERPL-CCNC: 22 U/L (ref 1–40)
BILIRUB SERPL-MCNC: 0.6 MG/DL (ref 0–1.2)
BUN SERPL-MCNC: 19 MG/DL (ref 8–23)
BUN/CREAT SERPL: 13.9 (ref 7–25)
CALCIUM SPEC-SCNC: 9.3 MG/DL (ref 8.6–10.5)
CHLORIDE SERPL-SCNC: 104 MMOL/L (ref 98–107)
CHOLEST SERPL-MCNC: 113 MG/DL (ref 0–200)
CO2 SERPL-SCNC: 23.2 MMOL/L (ref 22–29)
CREAT SERPL-MCNC: 1.37 MG/DL (ref 0.76–1.27)
CREAT UR-MCNC: 48.1 MG/DL
EGFRCR SERPLBLD CKD-EPI 2021: 52.8 ML/MIN/1.73
EXPIRATION DATE: ABNORMAL
EXPIRATION DATE: ABNORMAL
GLOBULIN UR ELPH-MCNC: 3 GM/DL
GLUCOSE BLDC GLUCOMTR-MCNC: 152 MG/DL (ref 70–130)
GLUCOSE SERPL-MCNC: 129 MG/DL (ref 65–99)
HBA1C MFR BLD: 6.1 % (ref 4.5–5.7)
HDLC SERPL-MCNC: 36 MG/DL (ref 40–60)
LDLC SERPL CALC-MCNC: 48 MG/DL (ref 0–100)
LDLC/HDLC SERPL: 1.18 {RATIO}
Lab: ABNORMAL
Lab: ABNORMAL
MICROALBUMIN/CREAT UR: 183 MG/G (ref 0–29)
POTASSIUM SERPL-SCNC: 4.3 MMOL/L (ref 3.5–5.2)
PROT SERPL-MCNC: 7 G/DL (ref 6–8.5)
PSA SERPL-MCNC: 3.9 NG/ML (ref 0–4)
SODIUM SERPL-SCNC: 138 MMOL/L (ref 136–145)
TRIGL SERPL-MCNC: 172 MG/DL (ref 0–150)
TSH SERPL DL<=0.05 MIU/L-ACNC: 2.2 UIU/ML (ref 0.27–4.2)
VLDLC SERPL-MCNC: 29 MG/DL (ref 5–40)

## 2024-07-17 PROCEDURE — 80061 LIPID PANEL: CPT | Performed by: INTERNAL MEDICINE

## 2024-07-17 PROCEDURE — 85027 COMPLETE CBC AUTOMATED: CPT | Performed by: INTERNAL MEDICINE

## 2024-07-17 PROCEDURE — 84443 ASSAY THYROID STIM HORMONE: CPT | Performed by: INTERNAL MEDICINE

## 2024-07-17 PROCEDURE — 80053 COMPREHEN METABOLIC PANEL: CPT | Performed by: INTERNAL MEDICINE

## 2024-07-17 PROCEDURE — 82043 UR ALBUMIN QUANTITATIVE: CPT | Performed by: INTERNAL MEDICINE

## 2024-07-17 PROCEDURE — 84153 ASSAY OF PSA TOTAL: CPT | Performed by: INTERNAL MEDICINE

## 2024-07-17 PROCEDURE — 82570 ASSAY OF URINE CREATININE: CPT | Performed by: INTERNAL MEDICINE

## 2024-07-17 RX ORDER — INSULIN ASPART 100 [IU]/ML
INJECTION, SUSPENSION SUBCUTANEOUS
Qty: 90 ML | Refills: 3 | Status: SHIPPED | OUTPATIENT
Start: 2024-07-17

## 2024-07-17 RX ORDER — PEN NEEDLE, DIABETIC 32GX 5/32"
NEEDLE, DISPOSABLE MISCELLANEOUS
Qty: 300 EACH | Refills: 3 | Status: SHIPPED | OUTPATIENT
Start: 2024-07-17

## 2024-07-17 RX ORDER — DAPAGLIFLOZIN 10 MG/1
1 TABLET, FILM COATED ORAL DAILY
Qty: 90 TABLET | Refills: 3 | Status: SHIPPED | OUTPATIENT
Start: 2024-07-17

## 2024-07-17 NOTE — PATIENT INSTRUCTIONS
Decrease evening dose of insulin to 32 units. If you are having a high carb lunch, take 10 units prior to the meal.

## 2024-07-17 NOTE — PROGRESS NOTES
"Chief Complaint   Patient presents with    Diabetes     Type II          HPI   Rashad Mendez is a 79 y.o. male had concerns including Diabetes (Type II).    He is checking blood sugars 4+ times per day with CGM. Data reviewed from the last two weeks shows average glucose 129 with variability of 26.6%. In target range 91%, high 7%, very high 0%, low 2%, very low 0%.   Pattern of: postprandial hyperglycemia, lows with the correction dose, occasional lows overnight    Current medications for diabetes include mixed insulin 40 units AM and 36 units at night and farxiga 10 mg daily. Sometimes taking an additional 32 units if Bgs are mid 200s after lunch.     He was not sure what types of meals are causing the night BG's at lunch.  Over the weekend had meatloaf (with ketchup), mashed potatoes, corn bread.  BG climbed greater than 200s.      The following portions of the patient's history were reviewed and updated as appropriate: allergies, current medications, past family history, past medical history, past social history, past surgical history, and problem list.      Review of Systems   Constitutional: Negative.    Endocrine:        See HPI        Physical Exam  Cardiovascular:      Pulses:           Dorsalis pedis pulses are 1+ on the right side and 1+ on the left side.   Musculoskeletal:      Right foot: Decreased range of motion.      Left foot: Decreased range of motion.   Feet:      Right foot:      Skin integrity: Skin integrity normal.      Toenail Condition: Right toenails are normal.      Left foot:      Skin integrity: Skin integrity normal.      Toenail Condition: Left toenails are normal.      Comments: Diabetic Foot Exam Performed and Monofilament Test Performed  Monofilament 5/5 bilaterally         /50 (BP Location: Left arm, Patient Position: Sitting, Cuff Size: Adult)   Pulse 54   Ht 170.2 cm (67\")   Wt 83 kg (183 lb)   SpO2 99%   BMI 28.66 kg/m²      Labs and imaging    CMP:  Lab Results "   Component Value Date    BUN 19 08/22/2023    CREATININE 1.33 (H) 08/22/2023    EGFR 44.0 (L) 07/06/2022    BCR 14.3 08/22/2023     08/22/2023    K 4.4 08/22/2023    CO2 22.7 08/22/2023    CALCIUM 9.8 08/22/2023    ALBUMIN 4.1 08/22/2023    BILITOT 0.6 08/22/2023    ALKPHOS 76 08/22/2023    AST 17 08/22/2023    ALT 21 08/22/2023     Lipid Panel:  Lab Results   Component Value Date    CHOL 141 07/06/2022    TRIG 168 (H) 08/22/2023    HDL 33 (L) 08/22/2023    VLDL 28 08/22/2023    LDL 38 08/22/2023     HbA1c:  Lab Results   Component Value Date    HGBA1C 6.1 (A) 07/17/2024    HGBA1C 6.4 08/22/2023     Glucose:  Lab Results   Component Value Date    POCGLU 152 (A) 07/17/2024     Microalbumin:  Lab Results   Component Value Date    MALBCRERATIO 82 (H) 08/22/2023     TSH:  Lab Results   Component Value Date    TSH 2.510 08/22/2023       Assessment and plan  Diagnoses and all orders for this visit:    1. Type 2 diabetes mellitus with stage 3a chronic kidney disease, with long-term current use of insulin (Primary)  Uncontrolled postprandial hyperglycemia and occasional hypoglycemia.  A1c down to 6.1.  Complicated by CKD 3, microalbuminuria, retinopathy.  No metformin due to CKD and history of GI intolerance.  Continue Farxiga.  I recommend a GLP-1 receptor agonist in the past, sent a prescription, but he does not want to take.  Continue mixed insulin 40 units a.m.  Decrease evening dose to 32 units.  If he is consuming a high carb meal with lunch, consider taking an additional 10 units before the meal on an as-needed basis.  Continue CGM.  Labs today.  Monofilament updated today. Diminished pulse and ROM. Needs DM shoes. Needs to have the form faxed here and I'm happy to sign off on.  Ophtho exam is updated yearly and requested the reports be sent here.  Patient and his wife are hoping to transition care closer to home due to transportation issues.  Referral was placed.  I am happy to see him back in the future as  needed.  -     POC Glucose, Blood  -     POC Glycosylated Hemoglobin (Hb A1C)  -     insulin aspart prot & aspart (NovoLOG Mix 70/30 FlexPen) (70-30) 100 UNIT/ML suspension pen-injector injection; 40 units in the morning, 32 units in the evening with dinner, 10 units before lunch for higher carb meal  Dispense: 90 mL; Refill: 3  -     dapagliflozin Propanediol (Farxiga) 10 MG tablet; Take 10 mg by mouth Daily.  Dispense: 90 tablet; Refill: 3  -     Insulin Pen Needle (BD Pen Needle Melody U/F) 32G X 4 MM misc; Use to inject insulin 3 times daily  Dispense: 300 each; Refill: 3  -     CBC (No Diff)  -     Comprehensive Metabolic Panel  -     Microalbumin / Creatinine Urine Ratio - Urine, Clean Catch  -     TSH  -     Ambulatory Referral to Endocrinology    2. Dyslipidemia  Fasting lipids today.  On atorvastatin 40 mg daily.  -     Lipid Panel    3. Nocturia  Check PSA with labs today.  Encouraged to establish with PCP.  -     PSA DIAGNOSTIC         Return as needed. The patient was instructed to contact the clinic with any interval questions or concerns.    Electronically signed by: Dorita Mitchell DO   Endocrinologist    Please note that portions of this note were completed with a voice recognition program.

## 2024-07-18 DIAGNOSIS — N18.31 TYPE 2 DIABETES MELLITUS WITH STAGE 3A CHRONIC KIDNEY DISEASE, WITH LONG-TERM CURRENT USE OF INSULIN: ICD-10-CM

## 2024-07-18 DIAGNOSIS — J30.1 SEASONAL ALLERGIC RHINITIS DUE TO POLLEN: ICD-10-CM

## 2024-07-18 DIAGNOSIS — E11.22 TYPE 2 DIABETES MELLITUS WITH STAGE 3A CHRONIC KIDNEY DISEASE, WITH LONG-TERM CURRENT USE OF INSULIN: ICD-10-CM

## 2024-07-18 DIAGNOSIS — J45.20 MILD INTERMITTENT ASTHMA WITHOUT COMPLICATION: ICD-10-CM

## 2024-07-18 DIAGNOSIS — Z79.4 TYPE 2 DIABETES MELLITUS WITH STAGE 3A CHRONIC KIDNEY DISEASE, WITH LONG-TERM CURRENT USE OF INSULIN: ICD-10-CM

## 2024-07-18 LAB
DEPRECATED RDW RBC AUTO: 44 FL (ref 37–54)
ERYTHROCYTE [DISTWIDTH] IN BLOOD BY AUTOMATED COUNT: 13.5 % (ref 12.3–15.4)
HCT VFR BLD AUTO: 38.4 % (ref 37.5–51)
HGB BLD-MCNC: 12.7 G/DL (ref 13–17.7)
MCH RBC QN AUTO: 29.8 PG (ref 26.6–33)
MCHC RBC AUTO-ENTMCNC: 33.1 G/DL (ref 31.5–35.7)
MCV RBC AUTO: 90.1 FL (ref 79–97)
PLATELET # BLD AUTO: 270 10*3/MM3 (ref 140–450)
PMV BLD AUTO: 11.5 FL (ref 6–12)
RBC # BLD AUTO: 4.26 10*6/MM3 (ref 4.14–5.8)
WBC NRBC COR # BLD AUTO: 11.33 10*3/MM3 (ref 3.4–10.8)

## 2024-07-18 RX ORDER — DAPAGLIFLOZIN 10 MG/1
1 TABLET, FILM COATED ORAL DAILY
Qty: 90 TABLET | Refills: 3 | OUTPATIENT
Start: 2024-07-18

## 2024-07-18 RX ORDER — MONTELUKAST SODIUM 10 MG/1
10 TABLET ORAL NIGHTLY
Qty: 90 TABLET | Refills: 0 | OUTPATIENT
Start: 2024-07-18

## 2024-07-18 NOTE — TELEPHONE ENCOUNTER
Rx Refill Note  Requested Prescriptions     Pending Prescriptions Disp Refills    Farxiga 10 MG tablet [Pharmacy Med Name: FARXIGA 10 MG TABLET] 90 tablet 3     Sig: TAKE ONE TABLET BY MOUTH DAILY          Last office visit with prescribing clinician: 7/17/2024     Next office visit with prescribing clinician: Visit date not found         Luana Genao MA  07/18/24, 08:12 EDT

## 2024-07-22 RX ORDER — CHOLESTYRAMINE 4 G/9G
POWDER, FOR SUSPENSION ORAL
Qty: 90 PACKET | Refills: 0 | Status: SHIPPED | OUTPATIENT
Start: 2024-07-22

## 2024-07-22 NOTE — TELEPHONE ENCOUNTER
Patient needs a refill on Cholestyramine sent to [facility].   Will need an office visit for additional refils.

## 2024-07-29 RX ORDER — PANTOPRAZOLE SODIUM 40 MG/1
40 TABLET, DELAYED RELEASE ORAL DAILY
Qty: 90 TABLET | Refills: 1 | Status: SHIPPED | OUTPATIENT
Start: 2024-07-29

## 2024-08-19 RX ORDER — CHOLESTYRAMINE 4 G/9G
POWDER, FOR SUSPENSION ORAL
Qty: 90 PACKET | Refills: 0 | Status: SHIPPED | OUTPATIENT
Start: 2024-08-19

## 2024-09-09 RX ORDER — AMLODIPINE BESYLATE 10 MG/1
10 TABLET ORAL DAILY
Qty: 90 TABLET | Refills: 3 | Status: SHIPPED | OUTPATIENT
Start: 2024-09-09

## 2024-09-16 DIAGNOSIS — J45.909 ASTHMA, UNSPECIFIED ASTHMA SEVERITY, UNSPECIFIED WHETHER COMPLICATED, UNSPECIFIED WHETHER PERSISTENT: ICD-10-CM

## 2024-09-16 RX ORDER — FLUTICASONE PROPIONATE AND SALMETEROL 500; 50 UG/1; UG/1
1 POWDER RESPIRATORY (INHALATION) 2 TIMES DAILY
Qty: 180 EACH | OUTPATIENT
Start: 2024-09-16

## 2024-12-02 ENCOUNTER — TELEPHONE (OUTPATIENT)
Dept: CARDIOLOGY | Facility: CLINIC | Age: 79
End: 2024-12-02

## 2024-12-02 NOTE — TELEPHONE ENCOUNTER
"    Caller: Rashad Mendez \"Neo\"    Relationship to patient: Self    Best call back number: [phone]     Type of visit:  1 YEAR FU    Requested date:  A FEW WEEKS OUT      Additional notes: PT IS NEEDING AN APPOINTMENT A FEW WEEKS OUT SO HE CAN ARRANGE FOR TRANSPORTATION.   IS BOOKED OUT UNTIL AUG 2025, THAT IS PAST THE 1 YEAR FU. PLEASE CALL PT WITH A SUITABLE DATE, THANK YOU!       "

## 2024-12-23 RX ORDER — ATORVASTATIN CALCIUM 40 MG/1
40 TABLET, FILM COATED ORAL
Qty: 90 TABLET | Refills: 1 | Status: SHIPPED | OUTPATIENT
Start: 2024-12-23 | End: 2024-12-30

## 2024-12-30 RX ORDER — ATORVASTATIN CALCIUM 40 MG/1
40 TABLET, FILM COATED ORAL
Qty: 90 TABLET | Refills: 1 | Status: SHIPPED | OUTPATIENT
Start: 2024-12-30

## 2024-12-30 RX ORDER — PANTOPRAZOLE SODIUM 40 MG/1
40 TABLET, DELAYED RELEASE ORAL DAILY
Qty: 90 TABLET | Refills: 1 | OUTPATIENT
Start: 2024-12-30

## 2024-12-30 RX ORDER — CLOPIDOGREL BISULFATE 75 MG/1
75 TABLET ORAL DAILY
Qty: 90 TABLET | Refills: 1 | Status: SHIPPED | OUTPATIENT
Start: 2024-12-30

## 2024-12-30 NOTE — TELEPHONE ENCOUNTER
Lab Results   Component Value Date    CHOL 113 07/17/2024    CHLPL 99 08/22/2023    TRIG 172 (H) 07/17/2024    HDL 36 (L) 07/17/2024    LDL 48 07/17/2024     Lab Results   Component Value Date    GLUCOSE 129 (H) 07/17/2024    BUN 19 07/17/2024    CREATININE 1.37 (H) 07/17/2024     07/17/2024    K 4.3 07/17/2024     07/17/2024    CALCIUM 9.3 07/17/2024    PROTEINTOT 7.0 07/17/2024    ALBUMIN 4.0 07/17/2024    ALT 20 07/17/2024    AST 22 07/17/2024    ALKPHOS 83 07/17/2024    BILITOT 0.6 07/17/2024    GLOB 3.0 07/17/2024    AGRATIO 1.3 07/17/2024    BCR 13.9 07/17/2024    ANIONGAP 10.8 07/17/2024    EGFR 52.8 (L) 07/17/2024

## 2025-01-02 RX ORDER — PANTOPRAZOLE SODIUM 40 MG/1
40 TABLET, DELAYED RELEASE ORAL DAILY
Qty: 90 TABLET | Refills: 1 | Status: SHIPPED | OUTPATIENT
Start: 2025-01-02

## 2025-04-21 RX ORDER — PANTOPRAZOLE SODIUM 40 MG/1
40 TABLET, DELAYED RELEASE ORAL DAILY
Qty: 90 TABLET | Refills: 1 | Status: SHIPPED | OUTPATIENT
Start: 2025-04-21

## 2025-06-23 RX ORDER — ATORVASTATIN CALCIUM 40 MG/1
40 TABLET, FILM COATED ORAL
Qty: 90 TABLET | Refills: 0 | Status: SHIPPED | OUTPATIENT
Start: 2025-06-23

## 2025-06-30 RX ORDER — TERAZOSIN 10 MG/1
10 CAPSULE ORAL
Qty: 30 CAPSULE | Refills: 0 | Status: SHIPPED | OUTPATIENT
Start: 2025-06-30

## 2025-06-30 NOTE — TELEPHONE ENCOUNTER
Lab Results   Component Value Date    GLUCOSE 129 (H) 07/17/2024    BUN 19 07/17/2024    CREATININE 1.37 (H) 07/17/2024     07/17/2024    K 4.3 07/17/2024     07/17/2024    CALCIUM 9.3 07/17/2024    PROTEINTOT 7.0 07/17/2024    ALBUMIN 4.0 07/17/2024    ALT 20 07/17/2024    AST 22 07/17/2024    ALKPHOS 83 07/17/2024    BILITOT 0.6 07/17/2024    GLOB 3.0 07/17/2024    AGRATIO 1.3 07/17/2024    BCR 13.9 07/17/2024    ANIONGAP 10.8 07/17/2024    EGFR 52.8 (L) 07/17/2024

## 2025-07-08 RX ORDER — CLOPIDOGREL BISULFATE 75 MG/1
75 TABLET ORAL DAILY
Qty: 30 TABLET | Refills: 0 | Status: SHIPPED | OUTPATIENT
Start: 2025-07-08

## 2025-07-08 NOTE — TELEPHONE ENCOUNTER
Lab Results   Component Value Date    WBC 11.33 (H) 07/17/2024    HGB 12.7 (L) 07/17/2024    HCT 38.4 07/17/2024    MCV 90.1 07/17/2024     07/17/2024

## 2025-07-28 DIAGNOSIS — I63.9 ACUTE CEREBRAL INFARCTION: ICD-10-CM

## 2025-07-28 DIAGNOSIS — I25.10 CORONARY ARTERY DISEASE INVOLVING NATIVE CORONARY ARTERY OF NATIVE HEART WITHOUT ANGINA PECTORIS: Primary | ICD-10-CM

## 2025-07-28 DIAGNOSIS — E78.5 DYSLIPIDEMIA: ICD-10-CM

## 2025-07-28 DIAGNOSIS — I67.9 CEREBROVASCULAR DISEASE: ICD-10-CM

## 2025-07-28 RX ORDER — TERAZOSIN 10 MG/1
10 CAPSULE ORAL
Qty: 30 CAPSULE | Refills: 0 | Status: SHIPPED | OUTPATIENT
Start: 2025-07-28 | End: 2025-07-28 | Stop reason: SDUPTHER

## 2025-07-28 RX ORDER — TERAZOSIN 10 MG/1
10 CAPSULE ORAL
Qty: 30 CAPSULE | Refills: 0 | Status: SHIPPED | OUTPATIENT
Start: 2025-07-28

## 2025-07-28 NOTE — TELEPHONE ENCOUNTER
Patient needs lab work for further refills of terazosin.  Orders placed for patient.  Spoke with patient.  He is instructed he will need lab work.  I will mail him lab slips so he can go locally to have this performed.  He verbalizes understanding.

## 2025-08-04 RX ORDER — CLOPIDOGREL BISULFATE 75 MG/1
75 TABLET ORAL DAILY
Qty: 30 TABLET | Refills: 0 | OUTPATIENT
Start: 2025-08-04

## 2025-08-18 RX ORDER — LISINOPRIL 40 MG/1
40 TABLET ORAL DAILY
Qty: 360 TABLET | Refills: 0 | OUTPATIENT
Start: 2025-08-18

## 2025-08-25 RX ORDER — AMLODIPINE BESYLATE 10 MG/1
10 TABLET ORAL DAILY
Qty: 90 TABLET | Refills: 3 | OUTPATIENT
Start: 2025-08-25

## 2025-08-25 RX ORDER — CLOPIDOGREL BISULFATE 75 MG/1
75 TABLET ORAL DAILY
Qty: 30 TABLET | Refills: 0 | OUTPATIENT
Start: 2025-08-25

## 2025-08-25 RX ORDER — TERAZOSIN 10 MG/1
10 CAPSULE ORAL
Qty: 30 CAPSULE | Refills: 0 | OUTPATIENT
Start: 2025-08-25